# Patient Record
Sex: MALE | Race: WHITE | NOT HISPANIC OR LATINO | Employment: PART TIME | URBAN - METROPOLITAN AREA
[De-identification: names, ages, dates, MRNs, and addresses within clinical notes are randomized per-mention and may not be internally consistent; named-entity substitution may affect disease eponyms.]

---

## 2018-01-24 ENCOUNTER — OFFICE VISIT (OUTPATIENT)
Dept: FAMILY MEDICINE CLINIC | Facility: CLINIC | Age: 43
End: 2018-01-24

## 2018-01-24 VITALS
RESPIRATION RATE: 18 BRPM | SYSTOLIC BLOOD PRESSURE: 132 MMHG | WEIGHT: 315 LBS | DIASTOLIC BLOOD PRESSURE: 84 MMHG | TEMPERATURE: 98 F | HEIGHT: 74 IN | HEART RATE: 74 BPM | BODY MASS INDEX: 40.43 KG/M2

## 2018-01-24 DIAGNOSIS — J18.9 PNEUMONIA OF BOTH LOWER LOBES DUE TO INFECTIOUS ORGANISM: Primary | ICD-10-CM

## 2018-01-24 PROCEDURE — 99213 OFFICE O/P EST LOW 20 MIN: CPT | Performed by: FAMILY MEDICINE

## 2018-01-24 RX ORDER — AZITHROMYCIN 250 MG/1
TABLET, FILM COATED ORAL
Qty: 6 TABLET | Refills: 0 | Status: SHIPPED | OUTPATIENT
Start: 2018-01-24 | End: 2018-05-22

## 2018-01-24 RX ORDER — ALBUTEROL SULFATE 90 UG/1
AEROSOL, METERED RESPIRATORY (INHALATION)
Qty: 1 INHALER | Refills: 5 | Status: SHIPPED | OUTPATIENT
Start: 2018-01-24 | End: 2018-05-22

## 2018-01-24 NOTE — PROGRESS NOTES
Subjective   Norberto Keita is a 42 y.o. male.     History of Present Illness     For the last several weeks he has been ill  Lots of coughing and congestion for the last 3 weeks  Tested negative for the flu in early Jan  Just coughing a lot and has a ST due to this  No fever  Used OTC medicine without relief    nonsmoker    Review of Systems   Constitutional: Negative.  Negative for fever.   HENT: Positive for congestion.    Respiratory: Positive for cough.        Objective   Physical Exam   Constitutional: He appears well-developed and well-nourished.   HENT:   Head: Normocephalic and atraumatic.   Right Ear: Hearing, tympanic membrane, external ear and ear canal normal.   Left Ear: Hearing, tympanic membrane, external ear and ear canal normal.   Nose: Nose normal.   Mouth/Throat: Uvula is midline, oropharynx is clear and moist and mucous membranes are normal.   Eyes: Conjunctivae and EOM are normal.   Neck: Normal range of motion.   Cardiovascular: Normal rate, regular rhythm and normal heart sounds.    Pulmonary/Chest: Effort normal. He has no wheezes. He has rhonchi.   Lymphadenopathy:     He has no cervical adenopathy.   Psychiatric: He has a normal mood and affect. His behavior is normal.   Nursing note and vitals reviewed.      Assessment/Plan   Norberto was seen today for uri.    Diagnoses and all orders for this visit:    Pneumonia of both lower lobes due to infectious organism  -     azithromycin (ZITHROMAX) 250 MG tablet; Take 2 tablets the first day, then 1 tablet daily for 4 days.  -     albuterol (PROVENTIL HFA;VENTOLIN HFA) 108 (90 Base) MCG/ACT inhaler; 1-2 puffs q 4-6 hours PRN  -     HYDROcodone-homatropine (HYCODAN) 5-1.5 MG/5ML syrup; Take 5 mL by mouth Every 6 (Six) Hours As Needed for Cough.    lungs sound bad, will treat with azithromycin, albuterol PRN as well as hycodan at night.  Pt  To call back INB.  He agrees.

## 2018-05-22 ENCOUNTER — OFFICE VISIT (OUTPATIENT)
Dept: FAMILY MEDICINE CLINIC | Facility: CLINIC | Age: 43
End: 2018-05-22

## 2018-05-22 VITALS
BODY MASS INDEX: 40.43 KG/M2 | DIASTOLIC BLOOD PRESSURE: 74 MMHG | SYSTOLIC BLOOD PRESSURE: 128 MMHG | RESPIRATION RATE: 18 BRPM | TEMPERATURE: 98.5 F | HEART RATE: 74 BPM | HEIGHT: 74 IN | WEIGHT: 315 LBS

## 2018-05-22 DIAGNOSIS — I10 HYPERTENSION, ESSENTIAL: ICD-10-CM

## 2018-05-22 DIAGNOSIS — M10.9 GOUT OF BIG TOE: Primary | ICD-10-CM

## 2018-05-22 PROBLEM — E29.1 HYPOGONADISM IN MALE: Status: ACTIVE | Noted: 2018-05-22

## 2018-05-22 PROBLEM — R68.82 REDUCED LIBIDO: Status: ACTIVE | Noted: 2018-05-22

## 2018-05-22 LAB
ALBUMIN SERPL-MCNC: 4.4 G/DL (ref 3.2–4.8)
ALBUMIN/GLOB SERPL: 1.5 G/DL (ref 1.5–2.5)
ALP SERPL-CCNC: 82 U/L (ref 25–100)
ALT SERPL-CCNC: 57 U/L (ref 7–40)
AST SERPL-CCNC: 34 U/L (ref 0–33)
BASOPHILS # BLD AUTO: 0.05 10*3/MM3 (ref 0–0.2)
BASOPHILS NFR BLD AUTO: 0.6 % (ref 0–1)
BILIRUB SERPL-MCNC: 1 MG/DL (ref 0.3–1.2)
BUN SERPL-MCNC: 13 MG/DL (ref 9–23)
BUN/CREAT SERPL: 13 (ref 7–25)
CALCIUM SERPL-MCNC: 9.2 MG/DL (ref 8.7–10.4)
CHLORIDE SERPL-SCNC: 105 MMOL/L (ref 99–109)
CO2 SERPL-SCNC: 28 MMOL/L (ref 20–31)
CREAT SERPL-MCNC: 1 MG/DL (ref 0.6–1.3)
EOSINOPHIL # BLD AUTO: 0.22 10*3/MM3 (ref 0–0.3)
EOSINOPHIL NFR BLD AUTO: 2.7 % (ref 0–3)
ERYTHROCYTE [DISTWIDTH] IN BLOOD BY AUTOMATED COUNT: 13.1 % (ref 11.3–14.5)
GFR SERPLBLD CREATININE-BSD FMLA CKD-EPI: 100 ML/MIN/1.73
GFR SERPLBLD CREATININE-BSD FMLA CKD-EPI: 82 ML/MIN/1.73
GLOBULIN SER CALC-MCNC: 3 GM/DL
GLUCOSE SERPL-MCNC: 98 MG/DL (ref 70–100)
HCT VFR BLD AUTO: 46.6 % (ref 38.9–50.9)
HGB BLD-MCNC: 15 G/DL (ref 13.1–17.5)
IMM GRANULOCYTES # BLD: 0.04 10*3/MM3 (ref 0–0.03)
IMM GRANULOCYTES NFR BLD: 0.5 % (ref 0–0.6)
LYMPHOCYTES # BLD AUTO: 2.01 10*3/MM3 (ref 0.6–4.8)
LYMPHOCYTES NFR BLD AUTO: 24.6 % (ref 24–44)
MCH RBC QN AUTO: 29.8 PG (ref 27–31)
MCHC RBC AUTO-ENTMCNC: 32.2 G/DL (ref 32–36)
MCV RBC AUTO: 92.6 FL (ref 80–99)
MONOCYTES # BLD AUTO: 0.47 10*3/MM3 (ref 0–1)
MONOCYTES NFR BLD AUTO: 5.8 % (ref 0–12)
NEUTROPHILS # BLD AUTO: 5.37 10*3/MM3 (ref 1.5–8.3)
NEUTROPHILS NFR BLD AUTO: 65.8 % (ref 41–71)
PLATELET # BLD AUTO: 230 10*3/MM3 (ref 150–450)
POTASSIUM SERPL-SCNC: 4.8 MMOL/L (ref 3.5–5.5)
PROT SERPL-MCNC: 7.4 G/DL (ref 5.7–8.2)
RBC # BLD AUTO: 5.03 10*6/MM3 (ref 4.2–5.76)
SODIUM SERPL-SCNC: 140 MMOL/L (ref 132–146)
URATE SERPL-MCNC: 7.7 MG/DL (ref 3.7–9.2)
WBC # BLD AUTO: 8.16 10*3/MM3 (ref 3.5–10.8)

## 2018-05-22 PROCEDURE — 99214 OFFICE O/P EST MOD 30 MIN: CPT | Performed by: FAMILY MEDICINE

## 2018-05-22 RX ORDER — PREDNISONE 20 MG/1
TABLET ORAL
Qty: 16 TABLET | Refills: 0 | Status: SHIPPED | OUTPATIENT
Start: 2018-05-22 | End: 2018-11-28

## 2018-05-22 RX ORDER — LISINOPRIL 40 MG/1
40 TABLET ORAL DAILY
Qty: 30 TABLET | Refills: 5 | Status: SHIPPED | OUTPATIENT
Start: 2018-05-22 | End: 2018-11-28 | Stop reason: SDUPTHER

## 2018-05-22 RX ORDER — HYDROCODONE BITARTRATE AND ACETAMINOPHEN 5; 325 MG/1; MG/1
1 TABLET ORAL EVERY 6 HOURS PRN
Qty: 12 TABLET | Refills: 0 | Status: SHIPPED | OUTPATIENT
Start: 2018-05-22 | End: 2018-11-28

## 2018-05-22 NOTE — PROGRESS NOTES
Subjective   Norberto Keita is a 42 y.o. male.     History of Present Illness     For the last 4 days he has had severe pain in his legft great toe  Feels like his gout is flaring up again  He had a flare about 1 month ago    He has been on prinzide for his BP for a while  I pointed out that this can cause flares of gout and we need to change this  He has not checked his BP at home, not dieting nor exercising and knows his weight is a negative impact on his health    The following portions of the patient's history were reviewed and updated as appropriate: allergies, current medications, past family history, past medical history, past social history, past surgical history and problem list.    Review of Systems   Constitutional: Negative.    HENT: Negative.    Eyes: Negative.    Respiratory: Negative.  Negative for shortness of breath.    Cardiovascular: Negative.  Negative for chest pain.   Gastrointestinal: Negative.    Musculoskeletal: Positive for joint swelling.        Toe pain   Skin: Positive for color change.   Neurological: Negative.    Psychiatric/Behavioral: Negative.    All other systems reviewed and are negative.      Objective   Physical Exam   Constitutional: He is oriented to person, place, and time. He appears well-developed and well-nourished. No distress.   HENT:   Head: Normocephalic and atraumatic.   Right Ear: Tympanic membrane, external ear and ear canal normal.   Left Ear: Tympanic membrane, external ear and ear canal normal.   Nose: Nose normal.   Mouth/Throat: Uvula is midline and oropharynx is clear and moist.   Eyes: Conjunctivae and EOM are normal.   Neck: Normal range of motion. Neck supple. No thyromegaly present.   Cardiovascular: Normal rate, regular rhythm and normal heart sounds.    No murmur heard.  Pulmonary/Chest: Effort normal and breath sounds normal. No respiratory distress.   Abdominal: Soft. Bowel sounds are normal. He exhibits no distension and no mass. There is no  tenderness.        Lymphadenopathy:     He has no cervical adenopathy.   Neurological: He is alert and oriented to person, place, and time.   Skin: Skin is warm and dry.   Psychiatric: He has a normal mood and affect. His behavior is normal. Judgment and thought content normal.   Nursing note and vitals reviewed.      Assessment/Plan   Norberto was seen today for gout.    Diagnoses and all orders for this visit:    Gout of big toe  -     predniSONE (DELTASONE) 20 MG tablet; 3 po daily 2 days then 2 po daily 3 days then 1 po daily 3 days then 1/2 po daily 2 days  -     HYDROcodone-acetaminophen (NORCO) 5-325 MG per tablet; Take 1 tablet by mouth Every 6 (Six) Hours As Needed for Severe Pain .  -     CBC & Differential  -     Comprehensive Metabolic Panel  -     Uric Acid    Hypertension, essential  -     lisinopril (PRINIVIL,ZESTRIL) 40 MG tablet; Take 1 tablet by mouth Daily.  -     CBC & Differential  -     Comprehensive Metabolic Panel    pred taper and pain medicine for gout.  Discussed with pt that he may need allopurinol.  Will stop the HCTZ portion of his BP medicine, check labs and see if this decreases frequency of gout attacks.  Pt to follow up if problems persist.  BP medicine to be changed due to gout, increase to lisinopril 40.  Diet and exercise discussed.

## 2018-05-24 ENCOUNTER — TELEPHONE (OUTPATIENT)
Dept: INTERNAL MEDICINE | Facility: CLINIC | Age: 43
End: 2018-05-24

## 2018-05-24 NOTE — TELEPHONE ENCOUNTER
----- Message from Cam Cheung MD sent at 5/23/2018  8:26 AM EDT -----  His labs overall look good.  Blood counts are normal, normal kidney and glucose and uric acid.  Liver still with very slight inflammation, would simply recommend weight loss for this and recheck in future to monitor.  Overall labs look fine though with no concerning problems found

## 2018-11-25 DIAGNOSIS — I10 HYPERTENSION, ESSENTIAL: ICD-10-CM

## 2018-11-26 RX ORDER — LISINOPRIL 40 MG/1
TABLET ORAL
Qty: 30 TABLET | Refills: 5 | OUTPATIENT
Start: 2018-11-26

## 2018-11-28 ENCOUNTER — TELEPHONE (OUTPATIENT)
Dept: FAMILY MEDICINE CLINIC | Facility: CLINIC | Age: 43
End: 2018-11-28

## 2018-11-28 DIAGNOSIS — I10 HYPERTENSION, ESSENTIAL: ICD-10-CM

## 2018-11-28 RX ORDER — LISINOPRIL 40 MG/1
40 TABLET ORAL DAILY
Qty: 30 TABLET | Refills: 0 | Status: SHIPPED | OUTPATIENT
Start: 2018-11-28 | End: 2018-12-27 | Stop reason: SDUPTHER

## 2018-11-28 NOTE — TELEPHONE ENCOUNTER
----- Message from Lily Nieto sent at 11/28/2018  3:44 PM EST -----  Contact: TOM;WIFE CALLED  PT HAS AN APPT ON MON 12/3 COULD HE GET ENOUGH  lisinopril (PRINIVIL,ZESTRIL) 40 MG tablet SENT TO [facility]   UNTIL HIS APPT?    [phone]-[phone]

## 2018-12-27 ENCOUNTER — TELEPHONE (OUTPATIENT)
Dept: FAMILY MEDICINE CLINIC | Facility: CLINIC | Age: 43
End: 2018-12-27

## 2018-12-27 DIAGNOSIS — I10 HYPERTENSION, ESSENTIAL: ICD-10-CM

## 2018-12-27 RX ORDER — LISINOPRIL 40 MG/1
40 TABLET ORAL DAILY
Qty: 30 TABLET | Refills: 2 | Status: SHIPPED | OUTPATIENT
Start: 2018-12-27 | End: 2019-01-07 | Stop reason: SDUPTHER

## 2018-12-27 NOTE — TELEPHONE ENCOUNTER
"rx sent to pharmacy. Make sure he is following up with Dr. Cheung. He has had two recent \"no shows\" [facility]  "

## 2018-12-27 NOTE — TELEPHONE ENCOUNTER
----- Message from Nayla Ceballos sent at 12/27/2018 11:59 AM EST -----  Contact: DEVEN ; MED REFILL   Medications    lisinopril (PRINIVIL,ZESTRIL) 40 MG tablet Take 1 tablet by mouth Daily.    [facility]      [address] - [address] - [phone]  - [phone] (Phone)  [phone] (Fax)

## 2019-01-07 ENCOUNTER — TELEPHONE (OUTPATIENT)
Dept: FAMILY MEDICINE CLINIC | Facility: CLINIC | Age: 44
End: 2019-01-07

## 2019-01-07 ENCOUNTER — OFFICE VISIT (OUTPATIENT)
Dept: FAMILY MEDICINE CLINIC | Facility: CLINIC | Age: 44
End: 2019-01-07

## 2019-01-07 VITALS
WEIGHT: 315 LBS | HEART RATE: 78 BPM | HEIGHT: 74 IN | RESPIRATION RATE: 18 BRPM | SYSTOLIC BLOOD PRESSURE: 132 MMHG | TEMPERATURE: 97.9 F | DIASTOLIC BLOOD PRESSURE: 102 MMHG | BODY MASS INDEX: 40.43 KG/M2

## 2019-01-07 DIAGNOSIS — R07.89 ATYPICAL CHEST PAIN: ICD-10-CM

## 2019-01-07 DIAGNOSIS — I10 HYPERTENSION, ESSENTIAL: Primary | ICD-10-CM

## 2019-01-07 DIAGNOSIS — G47.8 UNREFRESHED BY SLEEP: ICD-10-CM

## 2019-01-07 DIAGNOSIS — R29.818 SUSPECTED SLEEP APNEA: ICD-10-CM

## 2019-01-07 PROCEDURE — 99214 OFFICE O/P EST MOD 30 MIN: CPT | Performed by: FAMILY MEDICINE

## 2019-01-07 RX ORDER — LISINOPRIL 40 MG/1
40 TABLET ORAL DAILY
Qty: 30 TABLET | Refills: 2 | Status: SHIPPED | OUTPATIENT
Start: 2019-01-07 | End: 2019-03-08 | Stop reason: SDUPTHER

## 2019-01-07 RX ORDER — HYDROGEN PEROXIDE 2.65 ML/100ML
LIQUID ORAL; TOPICAL
COMMUNITY
Start: 2018-12-29 | End: 2021-06-30

## 2019-01-07 RX ORDER — CARVEDILOL 6.25 MG/1
TABLET ORAL
COMMUNITY
Start: 2018-12-29 | End: 2019-01-07 | Stop reason: SDUPTHER

## 2019-01-07 RX ORDER — CARVEDILOL 6.25 MG/1
6.25 TABLET ORAL 2 TIMES DAILY WITH MEALS
Qty: 60 TABLET | Refills: 2 | Status: SHIPPED | OUTPATIENT
Start: 2019-01-07 | End: 2019-04-19

## 2019-01-07 NOTE — PROGRESS NOTES
Subjective   Norberto Keita is a 43 y.o. male.     History of Present Illness     Norberto Keita  is here for follow-up of hypertension of several years duration. He is not exercising and is not adherent to a low-salt diet. Patient does not check his blood pressure. Patient denies palpitations. Cardiovascular risk factors: hypertension, male gender, obesity (BMI >= 30 kg/m2) and sedentary lifestyle. He is compliant with meds.    He had some chest pain on 12/28th and was transferred to [facility] for evaluation  His BP was high at that time  He had a normal stress test that was normal  They switched him to carvedilol and he has only been taking that    He has been stressed for a while as well      He also complains of fatigue all the time  Has unrefreshing sleep and feels like he could go back to bed any time  Worried about apnea      The following portions of the patient's history were reviewed and updated as appropriate: allergies, current medications, past family history, past medical history, past social history, past surgical history and problem list.    Review of Systems   Constitutional: Negative.    HENT: Negative.    Eyes: Negative.    Respiratory: Negative.    Cardiovascular: Positive for chest pain (not since the ER).   Gastrointestinal: Negative.  Negative for constipation and diarrhea.   Musculoskeletal: Negative.    Skin: Negative.    Neurological: Negative.    Psychiatric/Behavioral: Negative.  The patient is not nervous/anxious.    All other systems reviewed and are negative.      Objective   Physical Exam   Constitutional: He is oriented to person, place, and time. He appears well-developed and well-nourished. No distress.   Neck: No thyromegaly present.   Large neck   Cardiovascular: Normal rate, regular rhythm and normal heart sounds.   Pulmonary/Chest: Effort normal and breath sounds normal.   Abdominal: Soft. Bowel sounds are normal. He exhibits no distension. There is no tenderness.    Lymphadenopathy:     He has no cervical adenopathy.   Neurological: He is alert and oriented to person, place, and time.   Psychiatric: He has a normal mood and affect. His behavior is normal. Judgment and thought content normal.   Nursing note and vitals reviewed.      Assessment/Plan   Norberto was seen today for follow-up and hypertension.    Diagnoses and all orders for this visit:    Hypertension, essential  -     lisinopril (PRINIVIL,ZESTRIL) 40 MG tablet; Take 1 tablet by mouth Daily.  -     carvedilol (COREG) 6.25 MG tablet; Take 1 tablet by mouth 2 (Two) Times a Day With Meals.    Atypical chest pain    BP not well controlled but he had stopped lisinopril, will resume this along with the coreg and recheck in future. Asked pt to check at home (script given for home BP cuff) and will  Call back in a month with update.  Will request old records form ER visit  Will chck sleep study for sleep issues, fatigue.  I do suspect sleep apnea

## 2019-01-07 NOTE — TELEPHONE ENCOUNTER
----- Message from Veronica Cassidy sent at 1/7/2019  3:19 PM EST -----  Contact: PT; TOM  PATIENT THINKS HE HAS SLEEP APNEA AND WANTS TO DO A SLEEP HOME STUDY AND WANTS TO KNOW CAN HE GET A REFERRAL FOR THAT HE WAS HERE EARLIER AND FORGOT TO ASK DR SANTOS ABOUT IT.    PHONE: [phone]

## 2019-01-30 ENCOUNTER — TELEPHONE (OUTPATIENT)
Dept: FAMILY MEDICINE CLINIC | Facility: CLINIC | Age: 44
End: 2019-01-30

## 2019-01-30 NOTE — TELEPHONE ENCOUNTER
----- Message from Lily Nieto sent at 1/30/2019  8:43 AM EST -----  Contact: TOM;WIFE CALLED-[facility]  PT NEEDS REFILL ON carvedilol (COREG) 6.25 MG tablet AND   EQ ASPIRIN ADULT LOW DOSE 81 MG EC tablet  THAT WAS GIVEN TO HIM WHILE IN THE [facility] IN [place]  [phone]

## 2019-01-30 NOTE — TELEPHONE ENCOUNTER
Patient/patients wife informed, verbalized good understanding. States they went to  rx and pharmacy states they did not have it, called [facility] and spoke with Yaritza and she states they do have rx. Patient/patients wife informed, verbalized good understanding

## 2019-03-07 ENCOUNTER — TELEPHONE (OUTPATIENT)
Dept: FAMILY MEDICINE CLINIC | Facility: CLINIC | Age: 44
End: 2019-03-07

## 2019-03-07 NOTE — TELEPHONE ENCOUNTER
----- Message from Nayla Ceballos sent at 3/7/2019  9:19 AM EST -----  Contact: TOM; MED REFILL   lisinopril (PRINIVIL,ZESTRIL) 40 MG tablet Take 1 tablet by mouth Daily.    Preferred Pharmacies      [facility]       PT GOT A CALL STATING THAT THIS WAS DENIED. HIS WIFE WANTED WANTED TO KNOW WHY

## 2019-03-08 DIAGNOSIS — I10 HYPERTENSION, ESSENTIAL: ICD-10-CM

## 2019-03-08 RX ORDER — LISINOPRIL 40 MG/1
40 TABLET ORAL DAILY
Qty: 14 TABLET | Refills: 0 | Status: SHIPPED | OUTPATIENT
Start: 2019-03-08 | End: 2019-04-19 | Stop reason: SDUPTHER

## 2019-03-08 NOTE — TELEPHONE ENCOUNTER
He ws supposed to follow up for BP check in Feb, would like to see him to see how his BP is running.  Please schedule appointment.  Ok to call in refill to local pharmacy but would need to check BP here to get long term refill if BP is being controlled.

## 2019-04-19 ENCOUNTER — TELEPHONE (OUTPATIENT)
Dept: FAMILY MEDICINE CLINIC | Facility: CLINIC | Age: 44
End: 2019-04-19

## 2019-04-19 ENCOUNTER — OFFICE VISIT (OUTPATIENT)
Dept: FAMILY MEDICINE CLINIC | Facility: CLINIC | Age: 44
End: 2019-04-19

## 2019-04-19 VITALS
HEIGHT: 74 IN | BODY MASS INDEX: 40.43 KG/M2 | DIASTOLIC BLOOD PRESSURE: 80 MMHG | HEART RATE: 84 BPM | WEIGHT: 315 LBS | TEMPERATURE: 97.9 F | RESPIRATION RATE: 16 BRPM | SYSTOLIC BLOOD PRESSURE: 130 MMHG

## 2019-04-19 DIAGNOSIS — I10 HYPERTENSION, ESSENTIAL: ICD-10-CM

## 2019-04-19 DIAGNOSIS — J18.9 COMMUNITY ACQUIRED PNEUMONIA, UNSPECIFIED LATERALITY: Primary | ICD-10-CM

## 2019-04-19 PROCEDURE — 99213 OFFICE O/P EST LOW 20 MIN: CPT | Performed by: NURSE PRACTITIONER

## 2019-04-19 RX ORDER — IBUPROFEN 800 MG/1
800 TABLET ORAL EVERY 6 HOURS PRN
Qty: 30 TABLET | Refills: 1 | Status: SHIPPED | OUTPATIENT
Start: 2019-04-19 | End: 2019-05-24

## 2019-04-19 RX ORDER — METHYLPREDNISOLONE 4 MG/1
TABLET ORAL
Qty: 21 TABLET | Refills: 0 | Status: SHIPPED | OUTPATIENT
Start: 2019-04-19 | End: 2019-05-24

## 2019-04-19 RX ORDER — LISINOPRIL 40 MG/1
40 TABLET ORAL DAILY
Qty: 90 TABLET | Refills: 0 | Status: SHIPPED | OUTPATIENT
Start: 2019-04-19 | End: 2019-06-21 | Stop reason: SDUPTHER

## 2019-04-19 RX ORDER — AZITHROMYCIN 250 MG/1
TABLET, FILM COATED ORAL
Qty: 6 TABLET | Refills: 0 | Status: SHIPPED | OUTPATIENT
Start: 2019-04-19 | End: 2019-05-24

## 2019-04-19 NOTE — TELEPHONE ENCOUNTER
----- Message from Fabienne Flowers sent at 4/19/2019  3:22 PM EDT -----  Contact: DR SANTOS  PATIENT IS REQUESTING A PRESCRIPTION FOR IBUPROFEN 800MG  FOR HIS PNEUMONIA SENT TO [facility] IN [place].  [phone]

## 2019-04-19 NOTE — PROGRESS NOTES
Subjective   Norberto Keita is a 43 y.o. male.     History of Present Illness   Cough and chest congestion, body ache, Fever of 100, diarrhea started on Wed and once yesterday  Went to [facility] Wed was checked for flu was negative  Mucinex and Claritin Delsym Ibuprofen not helping  Feeling worse, having a lot of fatigue and night sweats, coughing spells  No dizziness or HA or swelling or CP or neck stiffness  No hx of tobacco abuse or smoking no hx of pneumonia or asthma  Just returned from vacation plane rides and cruise    HTN  Stable on current meds  Needs med refill    The following portions of the patient's history were reviewed and updated as appropriate: allergies, current medications, past family history, past medical history, past social history, past surgical history and problem list.    Review of Systems   Constitutional: Positive for activity change, diaphoresis, fatigue and fever.   HENT: Positive for congestion, postnasal drip and rhinorrhea. Negative for sore throat and trouble swallowing.    Eyes: Negative.  Negative for visual disturbance.   Respiratory: Positive for cough, chest tightness, shortness of breath and wheezing.    Cardiovascular: Negative.  Negative for chest pain and palpitations.   Gastrointestinal: Negative.    Musculoskeletal: Negative.  Negative for back pain, neck pain and neck stiffness.   Skin: Negative.    Neurological: Negative for dizziness, light-headedness and headache.   Hematological: Negative.    Psychiatric/Behavioral: Positive for sleep disturbance (due to cough).       Objective   Physical Exam   Constitutional: He is oriented to person, place, and time. He appears well-developed and well-nourished. No distress.   HENT:   Head: Normocephalic.   Right Ear: External ear normal.   Left Ear: External ear normal.   Nose: Nose normal.   Mouth/Throat: Oropharynx is clear and moist. No oropharyngeal exudate.   Eyes: Conjunctivae are normal. Pupils are equal, round, and reactive to  light.   Neck: Neck supple.   Cardiovascular: Normal rate, regular rhythm and normal heart sounds.   Pulmonary/Chest: Effort normal. No stridor. No respiratory distress. He has no decreased breath sounds. He has no wheezes. He has rales in the right upper field and the right middle field.   Musculoskeletal: He exhibits no edema.   Lymphadenopathy:     He has no cervical adenopathy.   Neurological: He is alert and oriented to person, place, and time.   Skin: Skin is warm and dry. Capillary refill takes less than 2 seconds. He is not diaphoretic.   Psychiatric: He has a normal mood and affect. His behavior is normal. Judgment and thought content normal.   Nursing note and vitals reviewed.        Assessment/Plan   Norberto was seen today for sinus congestion, pnd and chest congestion & cough.    Diagnoses and all orders for this visit:    Community acquired pneumonia, unspecified laterality  -     azithromycin (ZITHROMAX) 250 MG tablet; Take 2 tablets the first day, then 1 tablet daily for 4 days.  -     methylPREDNISolone (MEDROL, VENU,) 4 MG tablet; Take as directed on package instructions.    Hypertension, essential  -     lisinopril (PRINIVIL,ZESTRIL) 40 MG tablet; Take 1 tablet by mouth Daily.      Will refill BP meds  Will treat pt with Zpak and steroids, continue OTC meds for fever and cough/congestion  If not improving pt advised to follow up will check CXR or go to ER if having difficulty breathing pt agrees

## 2019-05-24 ENCOUNTER — OFFICE VISIT (OUTPATIENT)
Dept: FAMILY MEDICINE CLINIC | Facility: CLINIC | Age: 44
End: 2019-05-24

## 2019-05-24 ENCOUNTER — HOSPITAL ENCOUNTER (OUTPATIENT)
Dept: GENERAL RADIOLOGY | Facility: HOSPITAL | Age: 44
Discharge: HOME | End: 2019-05-24
Admitting: FAMILY MEDICINE

## 2019-05-24 ENCOUNTER — TELEPHONE (OUTPATIENT)
Dept: FAMILY MEDICINE CLINIC | Facility: CLINIC | Age: 44
End: 2019-05-24

## 2019-05-24 VITALS
TEMPERATURE: 98 F | SYSTOLIC BLOOD PRESSURE: 134 MMHG | HEART RATE: 76 BPM | BODY MASS INDEX: 40.43 KG/M2 | WEIGHT: 315 LBS | HEIGHT: 74 IN | DIASTOLIC BLOOD PRESSURE: 98 MMHG | RESPIRATION RATE: 18 BRPM

## 2019-05-24 DIAGNOSIS — G89.29 CHRONIC MIDLINE LOW BACK PAIN WITHOUT SCIATICA: Primary | ICD-10-CM

## 2019-05-24 DIAGNOSIS — G89.29 CHRONIC MIDLINE LOW BACK PAIN WITHOUT SCIATICA: ICD-10-CM

## 2019-05-24 DIAGNOSIS — M54.50 CHRONIC MIDLINE LOW BACK PAIN WITHOUT SCIATICA: Primary | ICD-10-CM

## 2019-05-24 DIAGNOSIS — M54.50 CHRONIC MIDLINE LOW BACK PAIN WITHOUT SCIATICA: ICD-10-CM

## 2019-05-24 PROCEDURE — 72110 X-RAY EXAM L-2 SPINE 4/>VWS: CPT

## 2019-05-24 PROCEDURE — 99214 OFFICE O/P EST MOD 30 MIN: CPT | Performed by: FAMILY MEDICINE

## 2019-05-24 RX ORDER — NAPROXEN 500 MG/1
500 TABLET ORAL 2 TIMES DAILY WITH MEALS
Qty: 60 TABLET | Refills: 1 | Status: SHIPPED | OUTPATIENT
Start: 2019-05-24 | End: 2019-08-16

## 2019-05-24 RX ORDER — HYDROCODONE BITARTRATE AND ACETAMINOPHEN 5; 325 MG/1; MG/1
1 TABLET ORAL EVERY 6 HOURS PRN
Qty: 12 TABLET | Refills: 0 | Status: SHIPPED | OUTPATIENT
Start: 2019-05-24 | End: 2019-06-21

## 2019-05-24 RX ORDER — CYCLOBENZAPRINE HCL 10 MG
TABLET ORAL
Qty: 30 TABLET | Refills: 2 | Status: SHIPPED | OUTPATIENT
Start: 2019-05-24 | End: 2020-08-24

## 2019-05-24 NOTE — PROGRESS NOTES
Subjective   Norberto Keita is a 43 y.o. male.     History of Present Illness     He has been working in the yard, cutting down tress this week and when he was lifting he had some pain in his low back  Started about 2 months ago when he was lifting something and then felt a pop in his low back, slowly got better after a couple days but then returned this week  This was reaggravated on 5/21/19 when he was moving a tree in his yard  Pain was quite bad and kept him laying around the last couple days  Pain is all lower middle back, no radiation  Pain is constant, aching pain    Aggravating: movement  Alleviating: heat    The following portions of the patient's history were reviewed and updated as appropriate: allergies, current medications, past family history, past medical history, past social history, past surgical history and problem list.    Review of Systems   Constitutional: Negative.    HENT: Negative.    Eyes: Negative.    Respiratory: Negative.  Negative for shortness of breath.    Cardiovascular: Negative.  Negative for chest pain.   Gastrointestinal: Negative.  Negative for constipation and diarrhea.   Musculoskeletal: Positive for back pain.   Skin: Negative.    Neurological: Negative.    Psychiatric/Behavioral: Negative.    All other systems reviewed and are negative.      Objective   Physical Exam   Constitutional: He appears well-developed and well-nourished. No distress.   Cardiovascular: Normal rate, regular rhythm and normal heart sounds.   Pulmonary/Chest: Effort normal and breath sounds normal.   Musculoskeletal:        Back:    Psychiatric: He has a normal mood and affect. His behavior is normal.   Nursing note and vitals reviewed.      Assessment/Plan   Norberto was seen today for back pain.    Diagnoses and all orders for this visit:    Chronic midline low back pain without sciatica  -     HYDROcodone-acetaminophen (NORCO) 5-325 MG per tablet; Take 1 tablet by mouth Every 6 (Six) Hours As Needed for  Severe Pain .  -     cyclobenzaprine (FLEXERIL) 10 MG tablet; 1 PO QHS  -     naproxen (NAPROSYN) 500 MG tablet; Take 1 tablet by mouth 2 (Two) Times a Day With Meals.  -     XR Spine Lumbar 4+ View; Future    NSAId, muscle relaxer, lortab PRN and home PT.  Check XR and f/u pending results.  Pt agrees    Work note 5/21 through 5/28

## 2019-05-28 DIAGNOSIS — M54.50 CHRONIC MIDLINE LOW BACK PAIN WITHOUT SCIATICA: Primary | ICD-10-CM

## 2019-05-28 DIAGNOSIS — R93.7 ABNORMAL X-RAY OF LUMBAR SPINE: ICD-10-CM

## 2019-05-28 DIAGNOSIS — G89.29 CHRONIC MIDLINE LOW BACK PAIN WITHOUT SCIATICA: Primary | ICD-10-CM

## 2019-06-04 ENCOUNTER — HOSPITAL ENCOUNTER (OUTPATIENT)
Dept: MRI IMAGING | Facility: HOSPITAL | Age: 44
Discharge: HOME | End: 2019-06-04
Admitting: FAMILY MEDICINE

## 2019-06-04 DIAGNOSIS — M54.50 CHRONIC MIDLINE LOW BACK PAIN WITHOUT SCIATICA: ICD-10-CM

## 2019-06-04 DIAGNOSIS — R93.7 ABNORMAL X-RAY OF LUMBAR SPINE: ICD-10-CM

## 2019-06-04 DIAGNOSIS — G89.29 CHRONIC MIDLINE LOW BACK PAIN WITHOUT SCIATICA: ICD-10-CM

## 2019-06-04 PROCEDURE — 72148 MRI LUMBAR SPINE W/O DYE: CPT

## 2019-06-07 DIAGNOSIS — G89.29 CHRONIC MIDLINE LOW BACK PAIN WITHOUT SCIATICA: ICD-10-CM

## 2019-06-07 DIAGNOSIS — M51.37 DEGENERATION OF INTERVERTEBRAL DISC AT L5-S1 LEVEL: Primary | ICD-10-CM

## 2019-06-07 DIAGNOSIS — R93.7 ABNORMAL X-RAY OF LUMBAR SPINE: ICD-10-CM

## 2019-06-07 DIAGNOSIS — M54.50 CHRONIC MIDLINE LOW BACK PAIN WITHOUT SCIATICA: ICD-10-CM

## 2019-06-12 ENCOUNTER — OFFICE VISIT (OUTPATIENT)
Dept: NEUROSURGERY | Facility: CLINIC | Age: 44
End: 2019-06-12

## 2019-06-12 VITALS
WEIGHT: 315 LBS | HEIGHT: 74 IN | BODY MASS INDEX: 40.43 KG/M2 | DIASTOLIC BLOOD PRESSURE: 138 MMHG | SYSTOLIC BLOOD PRESSURE: 195 MMHG | TEMPERATURE: 97.8 F

## 2019-06-12 DIAGNOSIS — M54.50 CHRONIC BILATERAL LOW BACK PAIN WITHOUT SCIATICA: Primary | ICD-10-CM

## 2019-06-12 DIAGNOSIS — G89.29 CHRONIC BILATERAL LOW BACK PAIN WITHOUT SCIATICA: Primary | ICD-10-CM

## 2019-06-12 DIAGNOSIS — E66.01 CLASS 3 SEVERE OBESITY DUE TO EXCESS CALORIES WITH SERIOUS COMORBIDITY AND BODY MASS INDEX (BMI) OF 40.0 TO 44.9 IN ADULT (HCC): ICD-10-CM

## 2019-06-12 PROBLEM — E66.813 CLASS 3 SEVERE OBESITY DUE TO EXCESS CALORIES WITH SERIOUS COMORBIDITY AND BODY MASS INDEX (BMI) OF 40.0 TO 44.9 IN ADULT: Status: ACTIVE | Noted: 2019-06-12

## 2019-06-12 PROCEDURE — 99243 OFF/OP CNSLTJ NEW/EST LOW 30: CPT | Performed by: NEUROLOGICAL SURGERY

## 2019-06-12 NOTE — PROGRESS NOTES
Subjective     Chief Complaint: Low back pain    Patient ID: Norberto Keita is a 43 y.o. male seen for consultation today at the request of  Cam Cheung MD    Back Pain   This is a recurrent problem. The current episode started 1 to 4 weeks ago. The problem occurs constantly. The problem is unchanged. The pain is present in the lumbar spine and sacro-iliac. The quality of the pain is described as aching. The pain is at a severity of 5/10. The pain is moderate. The pain is worse during the day. The symptoms are aggravated by bending, position, standing and twisting. Stiffness is present in the morning. Associated symptoms include weakness. Pertinent negatives include no abdominal pain, chest pain, dysuria, fever, headaches or numbness. Risk factors include recent trauma and obesity. He has tried analgesics and NSAIDs for the symptoms. The treatment provided mild relief.       This is a 43-year-old man who presents to my clinic with chief complaints of chronic low back pain.  He has had a relatively acute exacerbation in the last several weeks.  He attributes this to lifting some heavy loads of wood and twisting.  He has not tried conservative treatment.  He denies radiating pain into his legs.  He denies bladder/bowel incontinence.    The following portions of the patient's history were reviewed and updated as appropriate: allergies, current medications, past family history, past medical history, past social history, past surgical history and problem list.    Family history:   Family History   Problem Relation Age of Onset   • Diabetes Mother    • Hyperlipidemia Mother    • Hypertension Mother    • Coronary artery disease Father    • Hyperlipidemia Father    • Hypertension Father        Social history:   Social History     Socioeconomic History   • Marital status:      Spouse name: Not on file   • Number of children: Not on file   • Years of education: Not on file   • Highest education level: Not on file    Tobacco Use   • Smoking status: Never Smoker   • Smokeless tobacco: Never Used   Substance and Sexual Activity   • Alcohol use: Yes     Comment: 3-4 times weekly   • Drug use: No       Review of Systems   Constitutional: Positive for activity change, diaphoresis and fatigue. Negative for appetite change, chills, fever and unexpected weight change.   HENT: Negative for congestion, dental problem, drooling, ear discharge, ear pain, facial swelling, hearing loss, mouth sores, nosebleeds, postnasal drip, rhinorrhea, sinus pressure, sneezing, sore throat, tinnitus, trouble swallowing and voice change.    Eyes: Negative for photophobia, pain, discharge, redness, itching and visual disturbance.   Respiratory: Positive for apnea. Negative for cough, choking, chest tightness, shortness of breath, wheezing and stridor.    Cardiovascular: Negative for chest pain, palpitations and leg swelling.   Gastrointestinal: Negative for abdominal distention, abdominal pain, anal bleeding, blood in stool, constipation, diarrhea, nausea, rectal pain and vomiting.   Endocrine: Negative for cold intolerance, heat intolerance, polydipsia, polyphagia and polyuria.   Genitourinary: Negative for decreased urine volume, difficulty urinating, dysuria, enuresis, flank pain, frequency, genital sores, hematuria and urgency.   Musculoskeletal: Positive for back pain, myalgias and neck stiffness. Negative for arthralgias, gait problem, joint swelling and neck pain.   Skin: Negative for color change, pallor, rash and wound.   Allergic/Immunologic: Negative for environmental allergies, food allergies and immunocompromised state.   Neurological: Positive for weakness and light-headedness. Negative for dizziness, tremors, seizures, syncope, facial asymmetry, speech difficulty, numbness and headaches.   Hematological: Negative for adenopathy. Does not bruise/bleed easily.   Psychiatric/Behavioral: Negative for agitation, behavioral problems, confusion,  "decreased concentration, dysphoric mood, hallucinations, self-injury, sleep disturbance and suicidal ideas. The patient is not nervous/anxious and is not hyperactive.        Objective   Blood pressure (!) 195/138, temperature 97.8 °F (36.6 °C), temperature source Temporal, height 188 cm (74.02\"), weight (!) 149 kg (328 lb).  Body mass index is 42.09 kg/m².    Physical Exam   Constitutional: He is oriented to person, place, and time. He appears well-developed.  Non-toxic appearance.   HENT:   Head: Normocephalic and atraumatic.   Right Ear: Hearing normal.   Left Ear: Hearing normal.   Nose: Nose normal.   Eyes: Conjunctivae, EOM and lids are normal. Pupils are equal, round, and reactive to light.   Neck: Normal range of motion. No JVD present.   Cardiovascular: Normal rate and regular rhythm.   Pulses:       Radial pulses are 2+ on the right side, and 2+ on the left side.   Pulmonary/Chest: Effort normal. No stridor. No respiratory distress. He has no wheezes.   Musculoskeletal:        Right hip: He exhibits decreased range of motion.        Left hip: He exhibits decreased range of motion.        Thoracic back: He exhibits no tenderness, no swelling, no pain and no spasm.        Lumbar back: He exhibits decreased range of motion and tenderness. He exhibits no bony tenderness, no swelling, no pain and no spasm.   Muscle Group    L          R  Hip Flexor          5          5  Knee Extensor  5          5  ADF                   5          5  APF                   5          5  EHL                   5          5   Neurological: He is alert and oriented to person, place, and time. He has normal strength and normal reflexes. He displays normal reflexes. No cranial nerve deficit or sensory deficit. He exhibits normal muscle tone. He displays a negative Romberg sign. GCS eye subscore is 4. GCS verbal subscore is 5. GCS motor subscore is 6.   Reflex Scores:       Tricep reflexes are 2+ on the right side and 2+ on the left " side.       Bicep reflexes are 2+ on the right side and 2+ on the left side.       Brachioradialis reflexes are 2+ on the right side and 2+ on the left side.       Patellar reflexes are 2+ on the right side and 2+ on the left side.       Achilles reflexes are 2+ on the right side and 2+ on the left side.  Skin: Skin is warm and dry. No rash noted. No erythema.   Psychiatric: He has a normal mood and affect. His behavior is normal. Judgment and thought content normal.   Nursing note and vitals reviewed.        Assessment/Plan     Independent Review of Radiographic Studies:      Available for my review is a MRI of the lumbar spine which was performed on 6/4/2019.  This demonstrates moderate bordering on severe degenerative disc disease at L5-S1.  There is a broad-based disc osteophyte complex which is eccentric to the right side causing moderate lateral recess stenosis.  There is moderate bilateral neuroforaminal stenosis.  There appears to be a pars defect at L5-S1 with no evidence of spondylolisthesis.  The remainder of the MRI of the lumbar spine is unremarkable showing only mild degenerative disc disease at L4-5 and mild loss of lumbar lordosis.    Medical Decision Making:      This is a 43-year-old man with musculoskeletal low back pain.  He does have what appears to be a chronic pars defect at L5-S1 with severe degenerative disc disease, however I do not think that this is contributing to his symptoms at this point.  He also does not have any signs or symptoms of lumbosacral radiculopathy.  I think he is an excellent candidate for weight loss and physical therapy.  If is not better after 1 round of physical therapy, but I would add some pain management.  If he fails 6 months of aggressive pain management and physical therapy and is still having severe low back pain, and I would perform a lumbar myelogram to determine if he has a mobile spondylolisthesis.  I would like to follow-up with him in about 3 months, or  sooner if clinically indicated.    Norberto was seen today for back pain.    Diagnoses and all orders for this visit:    Chronic bilateral low back pain without sciatica  -     Ambulatory Referral to Physical Therapy Evaluate and treat    Class 3 severe obesity due to excess calories with serious comorbidity and body mass index (BMI) of 40.0 to 44.9 in adult (CMS/[facility])  -     Ambulatory Referral to Nutrition Services        Return in about 3 months (around 9/12/2019).           This document signed by GRABIEL Saunders MD June 12, 2019 11:00 AM

## 2019-06-19 ENCOUNTER — TELEPHONE (OUTPATIENT)
Dept: NEUROSURGERY | Facility: CLINIC | Age: 44
End: 2019-06-19

## 2019-06-19 NOTE — TELEPHONE ENCOUNTER
Patient called stating that he needed copy of PT order, confirmed address and placed order in outgoing mail

## 2019-06-20 ENCOUNTER — HOSPITAL ENCOUNTER (OUTPATIENT)
Dept: NUTRITION | Facility: HOSPITAL | Age: 44
Setting detail: RECURRING SERIES
Discharge: HOME | End: 2019-06-20

## 2019-06-20 VITALS — WEIGHT: 315 LBS | BODY MASS INDEX: 40.43 KG/M2 | HEIGHT: 74 IN

## 2019-06-20 PROCEDURE — 97802 MEDICAL NUTRITION INDIV IN: CPT

## 2019-06-21 ENCOUNTER — OFFICE VISIT (OUTPATIENT)
Dept: FAMILY MEDICINE CLINIC | Facility: CLINIC | Age: 44
End: 2019-06-21

## 2019-06-21 VITALS
RESPIRATION RATE: 16 BRPM | SYSTOLIC BLOOD PRESSURE: 152 MMHG | BODY MASS INDEX: 42.66 KG/M2 | OXYGEN SATURATION: 98 % | WEIGHT: 315 LBS | TEMPERATURE: 97.8 F | HEART RATE: 101 BPM | HEIGHT: 72 IN | DIASTOLIC BLOOD PRESSURE: 112 MMHG

## 2019-06-21 DIAGNOSIS — M54.50 CHRONIC MIDLINE LOW BACK PAIN WITHOUT SCIATICA: Primary | ICD-10-CM

## 2019-06-21 DIAGNOSIS — I10 HYPERTENSION, ESSENTIAL: ICD-10-CM

## 2019-06-21 DIAGNOSIS — G89.29 CHRONIC MIDLINE LOW BACK PAIN WITHOUT SCIATICA: Primary | ICD-10-CM

## 2019-06-21 DIAGNOSIS — M77.12 LEFT LATERAL EPICONDYLITIS: ICD-10-CM

## 2019-06-21 PROCEDURE — 99214 OFFICE O/P EST MOD 30 MIN: CPT | Performed by: FAMILY MEDICINE

## 2019-06-21 RX ORDER — ATENOLOL 100 MG/1
100 TABLET ORAL DAILY
Qty: 30 TABLET | Refills: 3 | Status: SHIPPED | OUTPATIENT
Start: 2019-06-21 | End: 2019-09-16 | Stop reason: SDUPTHER

## 2019-06-21 RX ORDER — LISINOPRIL 40 MG/1
40 TABLET ORAL DAILY
Qty: 90 TABLET | Refills: 0 | Status: SHIPPED | OUTPATIENT
Start: 2019-06-21 | End: 2019-09-16 | Stop reason: SDUPTHER

## 2019-06-21 NOTE — PROGRESS NOTES
Subjective   Norberto Keita is a 43 y.o. male.     History of Present Illness     Pt continues to deal with back pain  He is seeing Dr. Saunders for this.  He had MRI on 6/4/19 and they are planning on PT and weight loss for management with possible myelogram in 3 months INB.  He will be seeing PT in [place] where he lives      Pt is working part time  He has been off work since the 21rst, I had placed his off for a week  Needs forms for work    He has some pain in his left lateral elbow  Happened yesterday after moving something, he jerked and then felt pain  Hurts with certain movements today      His BP has been elevated still  He is on lisinopril 40, HCTZ caused gout  BP has been higher at the [facility] office as well  No CP and no other symptoms    The following portions of the patient's history were reviewed and updated as appropriate: allergies, current medications, past family history, past medical history, past social history, past surgical history and problem list.    Review of Systems   Constitutional: Negative.    HENT: Negative.    Eyes: Negative.    Respiratory: Negative.    Cardiovascular: Negative.  Negative for chest pain and palpitations.   Gastrointestinal: Negative.    Musculoskeletal: Positive for back pain.   Skin: Negative.    Neurological: Negative.    Psychiatric/Behavioral: Negative.    All other systems reviewed and are negative.      Objective   Physical Exam   Constitutional: He appears well-developed and well-nourished. No distress.   Cardiovascular: Normal rate, regular rhythm and normal heart sounds.   Pulmonary/Chest: Effort normal and breath sounds normal.   Musculoskeletal:        Arms:  Psychiatric: He has a normal mood and affect. His behavior is normal.   Nursing note and vitals reviewed.      Assessment/Plan   Norberto was seen today for back pain.    Diagnoses and all orders for this visit:    Chronic midline low back pain without sciatica    Hypertension, essential  -     atenolol  (TENORMIN) 100 MG tablet; Take 1 tablet by mouth Daily.  -     lisinopril (PRINIVIL,ZESTRIL) 40 MG tablet; Take 1 tablet by mouth Daily.    Left lateral epicondylitis      Will complete paperwork for being off work since 5/21/19 and he is seeing Dr. Saunders with plans from Dr. Saunders.  Will keep him off until July 15th.  Did tell pt that there is no way to guarantee when he will be mt to return to work, we may need to have Dr. Saunders help out with any further time off work in the future    Add atenolol for better BP control, recheck in one month    Ice, rest, tennis elbow brace for left elbow pain.  Pt agrees, f/u INB

## 2019-07-08 DIAGNOSIS — I10 HYPERTENSION, ESSENTIAL: ICD-10-CM

## 2019-07-09 RX ORDER — LISINOPRIL 40 MG/1
TABLET ORAL
Qty: 30 TABLET | Refills: 2 | OUTPATIENT
Start: 2019-07-09

## 2019-07-26 ENCOUNTER — APPOINTMENT (OUTPATIENT)
Dept: NUTRITION | Facility: HOSPITAL | Age: 44
End: 2019-07-26

## 2019-08-14 ENCOUNTER — TELEPHONE (OUTPATIENT)
Dept: NUTRITION | Facility: HOSPITAL | Age: 44
End: 2019-08-14

## 2019-08-14 NOTE — PROGRESS NOTES
"Adult Outpatient Nutrition  Assessment/PES    Patient Name:  Norberto Keita  YOB: 1975  MRN: [id number]    Assessment Date:  8/14/2019    Comments:  Patient was a no show for follow-up visit on 8/13/19. Called patient to reschedule. Patient requests call back in 2 weeks to reschedule. Quick phone follow-up also completed. Patient states he has been starting to incorporate exercise into his lifestyle. Patient states he started off well with tracking but has not been tracking much recently. Patient reports he is still being conscious of what he is eating and his calorie intake. Patient has been increasing his water intake but states he \"could do better\". Patient is unsure of current weight, but states that his clothes are fitting more loosely. Will reach back out to patient in 2 weeks to reschedule in-person follow-up.    Goal Completion:   -Exercise 30 minutes 3 days per week: 75%  -Track intake 3 days per week: 20%  -Drink 64 oz water 3 days per week: 40%  -Weight loss 1.8 kg (4 lbs) per month: 50%      Electronically signed by:  Mary Mccarty RD  08/14/19 11:54 AM  "

## 2019-08-16 ENCOUNTER — OFFICE VISIT (OUTPATIENT)
Dept: FAMILY MEDICINE CLINIC | Facility: CLINIC | Age: 44
End: 2019-08-16

## 2019-08-16 VITALS
RESPIRATION RATE: 18 BRPM | BODY MASS INDEX: 42.66 KG/M2 | HEART RATE: 78 BPM | TEMPERATURE: 98.6 F | DIASTOLIC BLOOD PRESSURE: 104 MMHG | SYSTOLIC BLOOD PRESSURE: 148 MMHG | HEIGHT: 72 IN | WEIGHT: 315 LBS

## 2019-08-16 DIAGNOSIS — R63.5 WEIGHT GAIN: ICD-10-CM

## 2019-08-16 DIAGNOSIS — I10 HYPERTENSION, ESSENTIAL: Primary | ICD-10-CM

## 2019-08-16 DIAGNOSIS — M54.50 ACUTE MIDLINE LOW BACK PAIN WITHOUT SCIATICA: ICD-10-CM

## 2019-08-16 PROCEDURE — 99214 OFFICE O/P EST MOD 30 MIN: CPT | Performed by: FAMILY MEDICINE

## 2019-08-16 RX ORDER — AMLODIPINE BESYLATE 10 MG/1
10 TABLET ORAL DAILY
Qty: 30 TABLET | Refills: 2 | Status: SHIPPED | OUTPATIENT
Start: 2019-08-16 | End: 2019-09-16 | Stop reason: SDUPTHER

## 2019-08-16 NOTE — PROGRESS NOTES
Subjective   Norberto Keita is a 43 y.o. male.     History of Present Illness     He is here to follow up with his back  He feels like it is better and PT has been beneficial   Would like to return to work  Seeing Dr. Saunders for this and they have discussed his weight  He feels his back is ready to return to work    BP is still an issue  He is taking his atenolol and lisinoppril   BP very high today    Weight is an issue, he asks about medicine for this  He has tried my fitness, pa, dietician, the whole 30 without great results  Hard to exercise with life and his back  Appetite is just an issue    The following portions of the patient's history were reviewed and updated as appropriate: allergies, current medications, past family history, past medical history, past social history, past surgical history and problem list.    Review of Systems   Constitutional: Negative.    HENT: Negative.    Eyes: Negative.    Respiratory: Negative.  Negative for shortness of breath.    Cardiovascular: Negative.  Negative for chest pain.   Gastrointestinal: Negative.    Musculoskeletal: Negative.  Negative for back pain.   Skin: Negative.    Neurological: Negative.    Psychiatric/Behavioral: Negative.    All other systems reviewed and are negative.      Objective   Physical Exam   Constitutional: He is oriented to person, place, and time. He appears well-developed and well-nourished. No distress.   Cardiovascular: Normal rate, regular rhythm and normal heart sounds.   Pulmonary/Chest: Effort normal and breath sounds normal.   Neurological: He is alert and oriented to person, place, and time.   Psychiatric: He has a normal mood and affect. His behavior is normal. Judgment and thought content normal.   Nursing note and vitals reviewed.      Assessment/Plan   Norberto was seen today for back pain.    Diagnoses and all orders for this visit:    Hypertension, essential  -     amLODIPine (NORVASC) 10 MG tablet; Take 1 tablet by mouth  Daily.    Weight gain    Acute midline low back pain without sciatica    BP is still high, will add norvasc 10 to lisinopril and atenolol.  Recheck in 2 months  He feels like his back is doing better.  He has not seen Dr. Saunders recently  He is ready to go back to work.  I wrote RTW note for pt today but was honestly not aware he had been off all this time.  We had discussed short term off work last time and then Dr. Saunders would give him further time off, not sure if that happened or what.  Ok to RTW at this time  Discussed diet and exercise for weight, not a candidate for phentermine due to BP at this time, will discuss at follow up in 2 months but he has to make lifestyle changes to have success

## 2019-09-16 ENCOUNTER — OFFICE VISIT (OUTPATIENT)
Dept: FAMILY MEDICINE CLINIC | Facility: CLINIC | Age: 44
End: 2019-09-16

## 2019-09-16 VITALS
RESPIRATION RATE: 16 BRPM | HEART RATE: 78 BPM | DIASTOLIC BLOOD PRESSURE: 100 MMHG | WEIGHT: 315 LBS | BODY MASS INDEX: 42.66 KG/M2 | SYSTOLIC BLOOD PRESSURE: 132 MMHG | TEMPERATURE: 97.1 F | HEIGHT: 72 IN

## 2019-09-16 DIAGNOSIS — I10 HYPERTENSION, ESSENTIAL: Primary | ICD-10-CM

## 2019-09-16 DIAGNOSIS — E66.01 MORBID OBESITY WITH BMI OF 45.0-49.9, ADULT (HCC): ICD-10-CM

## 2019-09-16 PROCEDURE — 99214 OFFICE O/P EST MOD 30 MIN: CPT | Performed by: FAMILY MEDICINE

## 2019-09-16 RX ORDER — LISINOPRIL 40 MG/1
40 TABLET ORAL DAILY
Qty: 90 TABLET | Refills: 1 | Status: SHIPPED | OUTPATIENT
Start: 2019-09-16 | End: 2020-07-24 | Stop reason: SDUPTHER

## 2019-09-16 RX ORDER — PHENTERMINE HYDROCHLORIDE 37.5 MG/1
37.5 TABLET ORAL
Qty: 30 TABLET | Refills: 1 | Status: SHIPPED | OUTPATIENT
Start: 2019-09-16 | End: 2019-11-22

## 2019-09-16 RX ORDER — AMLODIPINE BESYLATE 10 MG/1
10 TABLET ORAL DAILY
Qty: 90 TABLET | Refills: 1 | Status: SHIPPED | OUTPATIENT
Start: 2019-09-16 | End: 2020-07-24 | Stop reason: SDUPTHER

## 2019-09-16 RX ORDER — ATENOLOL 100 MG/1
100 TABLET ORAL DAILY
Qty: 90 TABLET | Refills: 1 | Status: SHIPPED | OUTPATIENT
Start: 2019-09-16 | End: 2020-07-24 | Stop reason: SDUPTHER

## 2019-09-16 NOTE — PROGRESS NOTES
Subjective   Norberto Keita is a 43 y.o. male.     History of Present Illness     He is here to recheck his BP after starting the norvasc last time  No SE with this medicine  He thinks his BP numbers have been a little better at home and has been checking on an infrequent basis  No     He is motivated to lose weight  Wants to try medicine.  They did buy the fast metabolism diet books and are thinking about starting this program, but have not started to this point.  He has thought about exercise, but has not started that either  We discussed the importance of lifestyle changes for success in losing weight    The following portions of the patient's history were reviewed and updated as appropriate: allergies, current medications, past family history, past medical history, past social history, past surgical history and problem list.    Review of Systems   Constitutional: Negative.    HENT: Negative.    Eyes: Negative.    Respiratory: Negative.  Negative for shortness of breath.    Cardiovascular: Negative.  Negative for chest pain and leg swelling.   Gastrointestinal: Negative.    Musculoskeletal: Negative.    Skin: Negative.    Neurological: Negative.    Psychiatric/Behavioral: Negative.    All other systems reviewed and are negative.       Objective   Physical Exam   Constitutional: He is oriented to person, place, and time. He appears well-developed and well-nourished. No distress.   Cardiovascular: Normal rate, regular rhythm and normal heart sounds.   Pulmonary/Chest: Effort normal and breath sounds normal.   Neurological: He is alert and oriented to person, place, and time.   Psychiatric: He has a normal mood and affect. His behavior is normal. Judgment and thought content normal.   Nursing note and vitals reviewed.      Assessment/Plan   Norberto was seen today for hypertension and med refill.    Diagnoses and all orders for this visit:    Hypertension, essential  -     amLODIPine (NORVASC) 10 MG tablet; Take 1  tablet by mouth Daily.  -     atenolol (TENORMIN) 100 MG tablet; Take 1 tablet by mouth Daily.  -     lisinopril (PRINIVIL,ZESTRIL) 40 MG tablet; Take 1 tablet by mouth Daily.    Morbid obesity with BMI of 45.0-49.9, adult (CMS/[facility])  -     phentermine (ADIPEX-P) 37.5 MG tablet; Take 1 tablet by mouth Every Morning Before Breakfast.    BP is slightly improved, will continue regimen of norvasc, atenolol and lisinopril.  Discussed the importance of adhering to these meds as well as implementing lifestyle changes to best improve his BP.  Diet and exercise strongly encouraged.  Ok to try phentermine for a month and pros/cons/SE discussed with pt.  Will recheck weight in 2 months.

## 2019-10-07 DIAGNOSIS — I10 HYPERTENSION, ESSENTIAL: ICD-10-CM

## 2019-10-07 RX ORDER — LISINOPRIL 40 MG/1
40 TABLET ORAL DAILY
Qty: 90 TABLET | Refills: 1 | OUTPATIENT
Start: 2019-10-07

## 2019-10-07 NOTE — TELEPHONE ENCOUNTER
juju     Medication refill     lisinopril (PRINIVIL,ZESTRIL) 40 MG tablet       [address] - [phone]  - [phone] FX

## 2019-10-15 NOTE — TELEPHONE ENCOUNTER
Patient informed, verbalized a good understanding   Immunohistochemistry ([id number] and [id number]) billing is not performed here. Please use the Immunohistochemistry Stain Billing plan to accomplish this. Immunohistochemistry ([id number] and [id number]) billing is not performed here. Please use the Immunohistochemistry Stain Billing plan to accomplish this.

## 2019-11-22 ENCOUNTER — OFFICE VISIT (OUTPATIENT)
Dept: FAMILY MEDICINE CLINIC | Facility: CLINIC | Age: 44
End: 2019-11-22

## 2019-11-22 VITALS
HEIGHT: 72 IN | BODY MASS INDEX: 42.66 KG/M2 | DIASTOLIC BLOOD PRESSURE: 84 MMHG | RESPIRATION RATE: 18 BRPM | SYSTOLIC BLOOD PRESSURE: 124 MMHG | TEMPERATURE: 97.2 F | HEART RATE: 80 BPM | WEIGHT: 315 LBS

## 2019-11-22 DIAGNOSIS — M77.11 RIGHT LATERAL EPICONDYLITIS: ICD-10-CM

## 2019-11-22 DIAGNOSIS — I10 HYPERTENSION, ESSENTIAL: Primary | ICD-10-CM

## 2019-11-22 PROCEDURE — 99213 OFFICE O/P EST LOW 20 MIN: CPT | Performed by: FAMILY MEDICINE

## 2019-11-22 NOTE — PROGRESS NOTES
Subjective   Norberto Keita is a 43 y.o. male.     History of Present Illness     Norberto Keita  is here for follow-up of hypertension of several years duration. He is not exercising and is not adherent to a low-salt diet. Patient does check his blood pressure. It is well controlled at home. Patient denies chest pain and palpitations. Cardiovascular risk factors: hypertension, male gender and obesity (BMI >= 30 kg/m2). He is compliant with meds.      Pain in right elbow  Sharp, nonradiating  Any movement of the arm and hand hurts at his lateral right elbow  No known injury          Review of Systems   Constitutional: Negative.    Musculoskeletal:        Elbow       Objective   Physical Exam   Constitutional: He appears well-developed and well-nourished. No distress.   Cardiovascular: Normal rate, regular rhythm and normal heart sounds.   Pulmonary/Chest: Effort normal and breath sounds normal.   Psychiatric: He has a normal mood and affect. His behavior is normal.   Nursing note and vitals reviewed.      Assessment/Plan   Norberto was seen today for hypertension.    Diagnoses and all orders for this visit:    Hypertension, essential    Right lateral epicondylitis    good control of BP, continue medicine and he has refills available    He did not lose weight with phentermine, cannot refill it    Tennis elbow brace for right elbow, could do injection in future, he will f/u INB

## 2019-12-10 ENCOUNTER — TELEPHONE (OUTPATIENT)
Dept: FAMILY MEDICINE CLINIC | Facility: CLINIC | Age: 44
End: 2019-12-10

## 2019-12-10 NOTE — TELEPHONE ENCOUNTER
TOM;PT CALLED  PT FEEL HIS BP MEDS ARE  CAUSING JT PAIN; REYES; DIZZINESS AND WATER RETENTION IN FEET LIKE A GOUT FLARE-  HE FEELS IT IS PROBABLY FROM TAKING ATENOLOL -HOW DOES HE   NEED TO CHANGE OR DISCONTINUE IT?    [phone]

## 2019-12-10 NOTE — TELEPHONE ENCOUNTER
Most likely to cause fluid retention is the amlodipine.  Lets stop it for 3 weeks and see how he does.  If symptoms still present resume it and then stop atenolol for 3 weeks.  F/u in 6 weeks to re-evaluate

## 2020-04-15 ENCOUNTER — TELEPHONE (OUTPATIENT)
Dept: FAMILY MEDICINE CLINIC | Facility: CLINIC | Age: 45
End: 2020-04-15

## 2020-04-15 RX ORDER — BROMPHENIRAMINE MALEATE, PSEUDOEPHEDRINE HYDROCHLORIDE, AND DEXTROMETHORPHAN HYDROBROMIDE 2; 30; 10 MG/5ML; MG/5ML; MG/5ML
5 SYRUP ORAL 4 TIMES DAILY PRN
Qty: 180 ML | Refills: 0 | Status: SHIPPED | OUTPATIENT
Start: 2020-04-15 | End: 2020-08-24

## 2020-04-15 NOTE — TELEPHONE ENCOUNTER
Called pt for more information. Cough has been coming & going for the past few months. Denies any fever. Sometimes he will have production with cough but usually clear. Pt has tried Robitussin for this only.

## 2020-04-15 NOTE — TELEPHONE ENCOUNTER
PT CALLED STATED THAT HE HAS HAD A COUGH FOR A COUPLE MONTHS NOW AND REQUEST FOR DR SEND HIM OVER A RX TO TAKE CARE OF COUGH.    PLEASE ADVISE.  CALL BACK:[phone]       [facility] - [place], [place] - [address]

## 2020-07-17 ENCOUNTER — TELEMEDICINE (OUTPATIENT)
Dept: FAMILY MEDICINE CLINIC | Facility: CLINIC | Age: 45
End: 2020-07-17

## 2020-07-17 DIAGNOSIS — M10.072 ACUTE IDIOPATHIC GOUT OF LEFT ANKLE: Primary | ICD-10-CM

## 2020-07-17 PROCEDURE — 99213 OFFICE O/P EST LOW 20 MIN: CPT | Performed by: NURSE PRACTITIONER

## 2020-07-17 RX ORDER — PREDNISONE 20 MG/1
TABLET ORAL
Qty: 10 TABLET | Refills: 0 | Status: SHIPPED | OUTPATIENT
Start: 2020-07-17 | End: 2020-08-24

## 2020-07-17 NOTE — PATIENT INSTRUCTIONS
Gout    Gout is a condition that causes painful swelling of the joints. Gout is a type of inflammation of the joints (arthritis). This condition is caused by having too much uric acid in the body. Uric acid is a chemical that forms when the body breaks down substances called purines. Purines are important for building body proteins.  When the body has too much uric acid, sharp crystals can form and build up inside the joints. This causes pain and swelling. Gout attacks can happen quickly and may be very painful (acute gout). Over time, the attacks can affect more joints and become more frequent (chronic gout). Gout can also cause uric acid to build up under the skin and inside the kidneys.  What are the causes?  This condition is caused by too much uric acid in your blood. This can happen because:  · Your kidneys do not remove enough uric acid from your blood. This is the most common cause.  · Your body makes too much uric acid. This can happen with some cancers and cancer treatments. It can also occur if your body is breaking down too many red blood cells (hemolytic anemia).  · You eat too many foods that are high in purines. These foods include organ meats and some seafood. Alcohol, especially beer, is also high in purines.  A gout attack may be triggered by trauma or stress.  What increases the risk?  You are more likely to develop this condition if you:  · Have a family history of gout.  · Are male and middle-aged.  · Are female and have gone through menopause.  · Are obese.  · Frequently drink alcohol, especially beer.  · Are dehydrated.  · Lose weight too quickly.  · Have an organ transplant.  · Have lead poisoning.  · Take certain medicines, including aspirin, cyclosporine, diuretics, levodopa, and niacin.  · Have kidney disease.  · Have a skin condition called psoriasis.  What are the signs or symptoms?  An attack of acute gout happens quickly. It usually occurs in just one joint. The most common place is  the big toe. Attacks often start at night. Other joints that may be affected include joints of the feet, ankle, knee, fingers, wrist, or elbow. Symptoms of this condition may include:  · Severe pain.  · Warmth.  · Swelling.  · Stiffness.  · Tenderness. The affected joint may be very painful to touch.  · Shiny, red, or purple skin.  · Chills and fever.  Chronic gout may cause symptoms more frequently. More joints may be involved. You may also have white or yellow lumps (tophi) on your hands or feet or in other areas near your joints.  How is this diagnosed?  This condition is diagnosed based on your symptoms, medical history, and physical exam. You may have tests, such as:  · Blood tests to measure uric acid levels.  · Removal of joint fluid with a thin needle (aspiration) to look for uric acid crystals.  · X-rays to look for joint damage.  How is this treated?  Treatment for this condition has two phases: treating an acute attack and preventing future attacks. Acute gout treatment may include medicines to reduce pain and swelling, including:  · NSAIDs.  · Steroids. These are strong anti-inflammatory medicines that can be taken by mouth (orally) or injected into a joint.  · Colchicine. This medicine relieves pain and swelling when it is taken soon after an attack. It can be given by mouth or through an IV.  Preventive treatment may include:  · Daily use of smaller doses of NSAIDs or colchicine.  · Use of a medicine that reduces uric acid levels in your blood.  · Changes to your diet. You may need to see a dietitian about what to eat and drink to prevent gout.  Follow these instructions at home:  During a gout attack    · If directed, put ice on the affected area:  ? Put ice in a plastic bag.  ? Place a towel between your skin and the bag.  ? Leave the ice on for 20 minutes, 2-3 times a day.  · Raise (elevate) the affected joint above the level of your heart as often as possible.  · Rest the joint as much as possible.  If the affected joint is in your leg, you may be given crutches to use.  · Follow instructions from your health care provider about eating or drinking restrictions.  Avoiding future gout attacks  · Follow a low-purine diet as told by your dietitian or health care provider. Avoid foods and drinks that are high in purines, including liver, kidney, anchovies, asparagus, herring, mushrooms, mussels, and beer.  · Maintain a healthy weight or lose weight if you are overweight. If you want to lose weight, talk with your health care provider. It is important that you do not lose weight too quickly.  · Start or maintain an exercise program as told by your health care provider.  Eating and drinking  · Drink enough fluids to keep your urine pale yellow.  · If you drink alcohol:  ? Limit how much you use to:  § 0-1 drink a day for women.  § 0-2 drinks a day for men.  ? Be aware of how much alcohol is in your drink. In the [place] one drink equals one 12 oz bottle of beer (355 mL) one 5 oz glass of wine (148 mL), or one 1½ oz glass of hard liquor (44 mL).  General instructions  · Take over-the-counter and prescription medicines only as told by your health care provider.  · Do not drive or use heavy machinery while taking prescription pain medicine.  · Return to your normal activities as told by your health care provider. Ask your health care provider what activities are safe for you.  · Keep all follow-up visits as told by your health care provider. This is important.  Contact a health care provider if you have:  · Another gout attack.  · Continuing symptoms of a gout attack after 10 days of treatment.  · Side effects from your medicines.  · Chills or a fever.  · Burning pain when you urinate.  · Pain in your lower back or belly.  Get help right away if you:  · Have severe or uncontrolled pain.  · Cannot urinate.  Summary  · Gout is painful swelling of the joints caused by inflammation.  · The most common site of pain is the big  toe, but it can affect other joints in the body.  · Medicines and dietary changes can help to prevent and treat gout attacks.  This information is not intended to replace advice given to you by your health care provider. Make sure you discuss any questions you have with your health care provider.  Document Released: 12/15/2001 Document Revised: 07/10/2019 Document Reviewed: 07/10/2019  [employer] Patient Education © 2020 [employer]    Low-Purine Eating Plan  A low-purine eating plan involves making food choices to limit your intake of purine. Purine is a kind of uric acid. Too much uric acid in your blood can cause certain conditions, such as gout and kidney stones. Eating a low-purine diet can help control these conditions.  What are tips for following this plan?  Reading food labels    · Avoid foods with saturated or Trans fat.  · Check the ingredient list of grains-based foods, such as bread and cereal, to make sure that they contain whole grains.  · Check the ingredient list of sauces or soups to make sure they do not contain meat or fish.  · When choosing soft drinks, check the ingredient list to make sure they do not contain high-fructose corn syrup.  Shopping  · Buy plenty of fresh fruits and vegetables.  · Avoid buying canned or fresh fish.  · Buy dairy products labeled as low-fat or nonfat.  · Avoid buying premade or processed foods. These foods are often high in fat, salt (sodium), and added sugar.  Cooking  · Use olive oil instead of butter when cooking. Oils like olive oil, canola oil, and sunflower oil contain healthy fats.  Meal planning  · Learn which foods do or do not affect you. If you find out that a food tends to cause your gout symptoms to flare up, avoid eating that food. You can enjoy foods that do not cause problems. If you have any questions about a food item, talk with your dietitian or health care provider.  · Limit foods high in fat, especially saturated fat. Fat makes it harder for your  body to get rid of uric acid.  · Choose foods that are lower in fat and are lean sources of protein.  General guidelines  · Limit alcohol intake to no more than 1 drink a day for nonpregnant women and 2 drinks a day for men. One drink equals 12 oz of beer, 5 oz of wine, or 1½ oz of hard liquor. Alcohol can affect the way your body gets rid of uric acid.  · Drink plenty of water to keep your urine clear or pale yellow. Fluids can help remove uric acid from your body.  · If directed by your health care provider, take a vitamin C supplement.  · Work with your health care provider and dietitian to develop a plan to achieve or maintain a healthy weight. Losing weight can help reduce uric acid in your blood.  What foods are recommended?  The items listed may not be a complete list. Talk with your dietitian about what dietary choices are best for you.  Foods low in purines  Foods low in purines do not need to be limited. These include:  · All fruits.  · All low-purine vegetables, pickles, and olives.  · Breads, pasta, rice, cornbread, and popcorn. Cake and other baked goods.  · All dairy foods.  · Eggs, nuts, and nut butters.  · Spices and condiments, such as salt, herbs, and vinegar.  · Plant oils, butter, and margarine.  · Water, sugar-free soft drinks, tea, coffee, and cocoa.  · Vegetable-based soups, broths, sauces, and gravies.  Foods moderate in purines  Foods moderate in purines should be limited to the amounts listed.  · ½ cup of asparagus, cauliflower, spinach, mushrooms, or green peas, each day.  · 2/3 cup uncooked oatmeal, each day.  · ¼ cup dry wheat bran or wheat germ, each day.  · 2-3 ounces of meat or poultry, each day.  · 4-6 ounces of shellfish, such as crab, lobster, oysters, or shrimp, each day.  · 1 cup cooked beans, peas, or lentils, each day.  · Soup, broths, or bouillon made from meat or fish. Limit these foods as much as possible.  What foods are not recommended?  The items listed may not be a  complete list. Talk with your dietitian about what dietary choices are best for you.  Limit your intake of foods high in purines, including:  · Beer and other alcohol.  · Meat-based gravy or sauce.  · Canned or fresh fish, such as:  ? Anchovies, sardines, herring, and tuna.  ? Mussels and scallops.  ? Codfish, trout, and declan.  · Lilly.  · Organ meats, such as:  ? Liver or kidney.  ? Tripe.  ? Sweetbreads (thymus gland or pancreas).  · Wild game or goose.  · Yeast or yeast extract supplements.  · Drinks sweetened with high-fructose corn syrup.  Summary  · Eating a low-purine diet can help control conditions caused by too much uric acid in the body, such as gout or kidney stones.  · Choose low-purine foods, limit alcohol, and limit foods high in fat.  · You will learn over time which foods do or do not affect you. If you find out that a food tends to cause your gout symptoms to flare up, avoid eating that food.  This information is not intended to replace advice given to you by your health care provider. Make sure you discuss any questions you have with your health care provider.  Document Released: 04/13/2012 Document Revised: 11/30/2018 Document Reviewed: 01/31/2018  Elsevier Patient Education © 2020 [employer].

## 2020-07-17 NOTE — PROGRESS NOTES
You have chosen to receive care through a telehealth visit.  Do you consent to use a video/audio connection for your medical care today? Yes  Visit lasted 11 minutes    HPI: Acute gout attack  Pain in left ankle that started at 4 am awakened by pain. No redness or swelling, no accident or injury to ankle. Pain with standing or moving of ankle or foot. Previous gout attacks in toe. Ate a big steak and some alcohol the night before.   Taking aleve and Ibuprofen seems to help some     ROS: as above  Physical Exam   Constitutional: He appears well-developed and well-nourished. No distress.   HENT:   Head: Normocephalic.   Right Ear: External ear normal.   Left Ear: External ear normal.   Nose: Nose normal.   Neck: Neck normal appearance.  Pulmonary/Chest: Effort normal.   Abdominal: Abdomen appears normal.   Musculoskeletal: Normal range of motion.         General: Tenderness present. No no effusion.   Neurological: He is alert.   Skin: Skin is dry. No rash noted. He is not diaphoretic. No erythema.   Psychiatric: He has a normal mood and affect.     DX: acute gout attack    Plan: Prednisone 20 mg BID take with food to avoid stomach pain.   Avoid alcohol and rich foods, steaks, burgers which can result in acute gout attacks.   Recommend pt make appt for fasting labs and physical since it has been since 2018 since last blood work. Pt agrees.

## 2020-07-21 ENCOUNTER — TELEPHONE (OUTPATIENT)
Dept: FAMILY MEDICINE CLINIC | Facility: CLINIC | Age: 45
End: 2020-07-21

## 2020-07-21 DIAGNOSIS — I10 HYPERTENSION, ESSENTIAL: ICD-10-CM

## 2020-07-21 RX ORDER — ATENOLOL 100 MG/1
TABLET ORAL
Qty: 90 TABLET | Refills: 0 | OUTPATIENT
Start: 2020-07-21

## 2020-07-21 RX ORDER — LISINOPRIL 40 MG/1
TABLET ORAL
Qty: 90 TABLET | Refills: 0 | OUTPATIENT
Start: 2020-07-21

## 2020-07-21 RX ORDER — AMLODIPINE BESYLATE 10 MG/1
TABLET ORAL
Qty: 90 TABLET | Refills: 0 | OUTPATIENT
Start: 2020-07-21

## 2020-07-22 NOTE — TELEPHONE ENCOUNTER
PT WIFE CALLED TO   REQUEST REFILL FOR RX'S BUT HAD CAME IN FOR APPT LAST WEEK.    PLEASE ADVISE.  CALL BACK:[phone]

## 2020-07-24 ENCOUNTER — TELEPHONE (OUTPATIENT)
Dept: FAMILY MEDICINE CLINIC | Facility: CLINIC | Age: 45
End: 2020-07-24

## 2020-07-24 DIAGNOSIS — I10 HYPERTENSION, ESSENTIAL: ICD-10-CM

## 2020-07-24 RX ORDER — ATENOLOL 100 MG/1
TABLET ORAL
Qty: 90 TABLET | Refills: 0 | OUTPATIENT
Start: 2020-07-24

## 2020-07-24 RX ORDER — AMLODIPINE BESYLATE 10 MG/1
10 TABLET ORAL DAILY
Qty: 30 TABLET | Refills: 1 | Status: SHIPPED | OUTPATIENT
Start: 2020-07-24 | End: 2020-08-24 | Stop reason: SDUPTHER

## 2020-07-24 RX ORDER — AMLODIPINE BESYLATE 10 MG/1
TABLET ORAL
Qty: 90 TABLET | Refills: 0 | OUTPATIENT
Start: 2020-07-24

## 2020-07-24 RX ORDER — ATENOLOL 100 MG/1
100 TABLET ORAL DAILY
Qty: 90 TABLET | Refills: 1 | OUTPATIENT
Start: 2020-07-24

## 2020-07-24 RX ORDER — LISINOPRIL 40 MG/1
40 TABLET ORAL DAILY
Qty: 30 TABLET | Refills: 1 | Status: SHIPPED | OUTPATIENT
Start: 2020-07-24 | End: 2020-08-24 | Stop reason: SDUPTHER

## 2020-07-24 RX ORDER — ATENOLOL 100 MG/1
100 TABLET ORAL DAILY
Qty: 30 TABLET | Refills: 1 | Status: SHIPPED | OUTPATIENT
Start: 2020-07-24 | End: 2020-08-24 | Stop reason: SDUPTHER

## 2020-07-24 RX ORDER — LISINOPRIL 40 MG/1
TABLET ORAL
Qty: 90 TABLET | Refills: 0 | OUTPATIENT
Start: 2020-07-24

## 2020-07-24 RX ORDER — AMLODIPINE BESYLATE 10 MG/1
10 TABLET ORAL DAILY
Qty: 90 TABLET | Refills: 1 | OUTPATIENT
Start: 2020-07-24

## 2020-07-24 RX ORDER — LISINOPRIL 40 MG/1
40 TABLET ORAL DAILY
Qty: 90 TABLET | Refills: 1 | OUTPATIENT
Start: 2020-07-24

## 2020-07-24 NOTE — TELEPHONE ENCOUNTER
Dr Cheung denied meds since he has not seen him in a while and Mr Neela was told on 7/17 video visit that he needs to make an office visit for labs and a physical (no blood work on file since 2018). I will send in 2 month supply but then pt needs to make an office visit. [facility]

## 2020-07-24 NOTE — TELEPHONE ENCOUNTER
Patient's wife, Dorene, states that he has requested medicine, but it was rejected.  He just had an appt with Jinny Conde.  She can be reached at [phone]

## 2020-08-24 ENCOUNTER — OFFICE VISIT (OUTPATIENT)
Dept: FAMILY MEDICINE CLINIC | Facility: CLINIC | Age: 45
End: 2020-08-24

## 2020-08-24 VITALS
HEIGHT: 72 IN | SYSTOLIC BLOOD PRESSURE: 130 MMHG | BODY MASS INDEX: 42.66 KG/M2 | RESPIRATION RATE: 18 BRPM | DIASTOLIC BLOOD PRESSURE: 110 MMHG | HEART RATE: 78 BPM | TEMPERATURE: 97.8 F | WEIGHT: 315 LBS

## 2020-08-24 DIAGNOSIS — I10 HYPERTENSION, ESSENTIAL: Primary | ICD-10-CM

## 2020-08-24 DIAGNOSIS — R40.0 DAYTIME SOMNOLENCE: ICD-10-CM

## 2020-08-24 DIAGNOSIS — E66.01 CLASS 3 SEVERE OBESITY DUE TO EXCESS CALORIES WITH SERIOUS COMORBIDITY AND BODY MASS INDEX (BMI) OF 40.0 TO 44.9 IN ADULT (HCC): ICD-10-CM

## 2020-08-24 DIAGNOSIS — R05.9 COUGH: ICD-10-CM

## 2020-08-24 DIAGNOSIS — F41.1 GENERALIZED ANXIETY DISORDER: ICD-10-CM

## 2020-08-24 DIAGNOSIS — Z82.49 FAMILY HISTORY OF CORONARY ARTERY DISEASE: ICD-10-CM

## 2020-08-24 DIAGNOSIS — M10.072 ACUTE IDIOPATHIC GOUT OF LEFT ANKLE: ICD-10-CM

## 2020-08-24 DIAGNOSIS — I10 HYPERTENSION, ESSENTIAL: ICD-10-CM

## 2020-08-24 DIAGNOSIS — Z00.00 WELL ADULT EXAM: Primary | ICD-10-CM

## 2020-08-24 DIAGNOSIS — G47.30 OBSERVED SLEEP APNEA: ICD-10-CM

## 2020-08-24 PROCEDURE — 99396 PREV VISIT EST AGE 40-64: CPT | Performed by: FAMILY MEDICINE

## 2020-08-24 RX ORDER — ESCITALOPRAM OXALATE 10 MG/1
10 TABLET ORAL DAILY
Qty: 30 TABLET | Refills: 2 | Status: SHIPPED | OUTPATIENT
Start: 2020-08-24 | End: 2021-03-30

## 2020-08-24 RX ORDER — LORATADINE 10 MG/1
10 TABLET ORAL DAILY
Qty: 30 TABLET | Refills: 3 | Status: SHIPPED | OUTPATIENT
Start: 2020-08-24 | End: 2021-06-30

## 2020-08-24 RX ORDER — ATENOLOL 100 MG/1
100 TABLET ORAL DAILY
Qty: 90 TABLET | Refills: 1 | Status: SHIPPED | OUTPATIENT
Start: 2020-08-24 | End: 2021-03-30 | Stop reason: SDUPTHER

## 2020-08-24 RX ORDER — LISINOPRIL 40 MG/1
40 TABLET ORAL DAILY
Qty: 90 TABLET | Refills: 1 | Status: SHIPPED | OUTPATIENT
Start: 2020-08-24 | End: 2021-03-30 | Stop reason: SDUPTHER

## 2020-08-24 RX ORDER — AMLODIPINE BESYLATE 10 MG/1
10 TABLET ORAL DAILY
Qty: 90 TABLET | Refills: 1 | Status: SHIPPED | OUTPATIENT
Start: 2020-08-24 | End: 2021-03-30 | Stop reason: SDUPTHER

## 2020-08-24 RX ORDER — BROMPHENIRAMINE MALEATE, PSEUDOEPHEDRINE HYDROCHLORIDE, AND DEXTROMETHORPHAN HYDROBROMIDE 2; 30; 10 MG/5ML; MG/5ML; MG/5ML
5 SYRUP ORAL 4 TIMES DAILY PRN
Qty: 180 ML | Refills: 0 | Status: SHIPPED | OUTPATIENT
Start: 2020-08-24 | End: 2021-03-30

## 2020-08-24 NOTE — PROGRESS NOTES
Subjective   Norberto Keita is a 44 y.o. male.     History of Present Illness     Norberto Keita 44 y.o. male who presents for yearly preventive exam.  His overall health is: good  He exercises none..  He does see his dentist regularly  His diet is typical american  He describes his alcohol intake as 2-3 a night  He knows what his cholesterol is No  He is sexually active  He does have ED or other bedroom issues  Does patient smoke No  If so how long?    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  He just wants to see a cardiologist to make sure no issues with his heart  His dad has had CAD and triple bypass as well      The following portions of the patient's history were reviewed and updated as appropriate: allergies, current medications, past family history, past medical history, past social history, past surgical history and problem list.    Review of Systems   Constitutional: Negative.    HENT: Negative.    Eyes: Negative.    Respiratory: Negative.  Negative for shortness of breath.    Cardiovascular: Negative.  Negative for chest pain.   Gastrointestinal: Negative.    Musculoskeletal: Negative.    Skin: Negative.    Neurological: Negative.    Psychiatric/Behavioral: Positive for dysphoric mood. The patient is nervous/anxious.    All other systems reviewed and are negative.      Objective   Physical Exam   Constitutional: He is oriented to person, place, and time. He appears well-developed and well-nourished. No distress.   obese   HENT:   Head: Normocephalic and atraumatic.   Right Ear: Tympanic membrane, external ear and ear canal normal.   Left Ear: Tympanic membrane, external ear and ear canal normal.    Nose: Nose normal.   Eyes: Conjunctivae and EOM are normal.   Neck: Normal range of motion. Neck supple. No thyromegaly present.   Cardiovascular: Normal rate, regular rhythm and normal heart sounds.   No murmur heard.  Pulmonary/Chest: Effort normal and breath sounds normal. No respiratory distress.   Abdominal: Soft. Bowel sounds are normal. He exhibits no distension and no mass. There is no tenderness.   Lymphadenopathy:     He has no cervical adenopathy.   Neurological: He is alert and oriented to person, place, and time.   Skin: Skin is warm and dry.   Psychiatric: He has a normal mood and affect. His behavior is normal. Judgment and thought content normal.   Nursing note and vitals reviewed.      Assessment/Plan   Norberto was seen today for hypertension.    Diagnoses and all orders for this visit:    Well adult exam    Class 3 severe obesity due to excess calories with serious comorbidity and body mass index (BMI) of 40.0 to 44.9 in adult (CMS/[facility])    Hypertension, essential  -     amLODIPine (Norvasc) 10 MG tablet; Take 1 tablet by mouth Daily.  -     atenolol (Tenormin) 100 MG tablet; Take 1 tablet by mouth Daily.  -     lisinopril (PRINIVIL,ZESTRIL) 40 MG tablet; Take 1 tablet by mouth Daily.  -     CBC & Differential  -     Comprehensive Metabolic Panel  -     Uric Acid  -     Lipid Panel    Family history of coronary artery disease  -     Ambulatory Referral to Cardiology    Generalized anxiety disorder  -     escitalopram (Lexapro) 10 MG tablet; Take 1 tablet by mouth Daily.    Cough  -     brompheniramine-pseudoephedrine-DM 30-2-10 MG/5ML syrup; Take 5 mL by mouth 4 (Four) Times a Day As Needed for Cough.  -     loratadine (Claritin) 10 MG tablet; Take 1 tablet by mouth Daily.    Acute idiopathic gout of left ankle  -     Uric Acid    Observed sleep apnea  -     Home Sleep Study; Future    Daytime somnolence  -     Home Sleep Study; Future    counseled pt about well adult care, diet, exercise, and  weight loss recommended.  The various health benefits also discussed    BP is high today, will continue medicine, use lifestyle changes and check sleep study as uncontrolled sleep apnea can contribute to HTN.  Will f/u pending sleep study.  Will set him up with cardiology due to family history of CAD.  Ok claritin for PND and cough.  Will start lexapro for mood.  Pros/cons/SE discussed with pt, will recheck in 6 weeks along with weight and BP

## 2020-08-25 LAB
ALBUMIN SERPL-MCNC: 4.8 G/DL (ref 4–5)
ALBUMIN/GLOB SERPL: 1.7 {RATIO} (ref 1.2–2.2)
ALP SERPL-CCNC: 86 IU/L (ref 39–117)
ALT SERPL-CCNC: 39 IU/L (ref 0–44)
AST SERPL-CCNC: 26 IU/L (ref 0–40)
BASOPHILS # BLD AUTO: 0.1 X10E3/UL (ref 0–0.2)
BASOPHILS NFR BLD AUTO: 2 %
BILIRUB SERPL-MCNC: 0.9 MG/DL (ref 0–1.2)
BUN SERPL-MCNC: 13 MG/DL (ref 6–24)
BUN/CREAT SERPL: 12 (ref 9–20)
CALCIUM SERPL-MCNC: 9.6 MG/DL (ref 8.7–10.2)
CHLORIDE SERPL-SCNC: 99 MMOL/L (ref 96–106)
CHOLEST SERPL-MCNC: 239 MG/DL (ref 100–199)
CO2 SERPL-SCNC: 28 MMOL/L (ref 20–29)
CREAT SERPL-MCNC: 1.06 MG/DL (ref 0.76–1.27)
EOSINOPHIL # BLD AUTO: 0.2 X10E3/UL (ref 0–0.4)
EOSINOPHIL NFR BLD AUTO: 3 %
ERYTHROCYTE [DISTWIDTH] IN BLOOD BY AUTOMATED COUNT: 12.6 % (ref 11.6–15.4)
GLOBULIN SER CALC-MCNC: 2.8 G/DL (ref 1.5–4.5)
GLUCOSE SERPL-MCNC: 111 MG/DL (ref 65–99)
HCT VFR BLD AUTO: 54 % (ref 37.5–51)
HDLC SERPL-MCNC: 41 MG/DL
HGB BLD-MCNC: 17.7 G/DL (ref 13–17.7)
IMM GRANULOCYTES # BLD AUTO: 0.1 X10E3/UL (ref 0–0.1)
IMM GRANULOCYTES NFR BLD AUTO: 1 %
LDLC SERPL CALC-MCNC: 157 MG/DL (ref 0–99)
LYMPHOCYTES # BLD AUTO: 1.8 X10E3/UL (ref 0.7–3.1)
LYMPHOCYTES NFR BLD AUTO: 24 %
MCH RBC QN AUTO: 30.1 PG (ref 26.6–33)
MCHC RBC AUTO-ENTMCNC: 32.8 G/DL (ref 31.5–35.7)
MCV RBC AUTO: 92 FL (ref 79–97)
MONOCYTES # BLD AUTO: 0.5 X10E3/UL (ref 0.1–0.9)
MONOCYTES NFR BLD AUTO: 7 %
NEUTROPHILS # BLD AUTO: 4.8 X10E3/UL (ref 1.4–7)
NEUTROPHILS NFR BLD AUTO: 63 %
PLATELET # BLD AUTO: 207 X10E3/UL (ref 150–450)
POTASSIUM SERPL-SCNC: 5.4 MMOL/L (ref 3.5–5.2)
PROT SERPL-MCNC: 7.6 G/DL (ref 6–8.5)
RBC # BLD AUTO: 5.88 X10E6/UL (ref 4.14–5.8)
SODIUM SERPL-SCNC: 142 MMOL/L (ref 134–144)
TRIGL SERPL-MCNC: 206 MG/DL (ref 0–149)
URATE SERPL-MCNC: 8.2 MG/DL (ref 3.7–8.6)
VLDLC SERPL CALC-MCNC: 41 MG/DL (ref 5–40)
WBC # BLD AUTO: 7.6 X10E3/UL (ref 3.4–10.8)

## 2020-09-04 ENCOUNTER — TELEPHONE (OUTPATIENT)
Dept: FAMILY MEDICINE CLINIC | Facility: CLINIC | Age: 45
End: 2020-09-04

## 2020-09-04 RX ORDER — SILDENAFIL 100 MG/1
100 TABLET, FILM COATED ORAL DAILY PRN
Qty: 30 TABLET | Refills: 2 | Status: SHIPPED | OUTPATIENT
Start: 2020-09-04 | End: 2020-09-14

## 2020-09-04 NOTE — TELEPHONE ENCOUNTER
Best price is actually [facility] but I can send it to hometown in [place], their price is pretty good!

## 2020-09-04 NOTE — TELEPHONE ENCOUNTER
----- Message from Norberto Keita sent at 9/4/2020 10:22 AM EDT -----  Regarding: Prescription Question  Contact: [phone]  Balta Cheung    During our recent visit, you had mentioned that you would prescribe me with viagra. I understand that my current pharmacy at [facility] does not fill this type of prescription.     I know you had mentioned that it could be filled at a [facility] and I just realized that we have one location in [place]. Please advise how I can get that prescription filled and sent over to that location.    Thank you.

## 2020-09-14 ENCOUNTER — TELEPHONE (OUTPATIENT)
Dept: FAMILY MEDICINE CLINIC | Facility: CLINIC | Age: 45
End: 2020-09-14

## 2020-09-14 DIAGNOSIS — N52.9 ERECTILE DYSFUNCTION, UNSPECIFIED ERECTILE DYSFUNCTION TYPE: Primary | ICD-10-CM

## 2020-09-14 RX ORDER — TADALAFIL 20 MG/1
TABLET ORAL
Qty: 5 TABLET | Refills: 5 | Status: SHIPPED | OUTPATIENT
Start: 2020-09-14 | End: 2021-03-30 | Stop reason: SDUPTHER

## 2020-09-14 NOTE — TELEPHONE ENCOUNTER
----- Message from Norberto Keita sent at 9/14/2020  2:29 PM EDT -----  Regarding: Prescription Question  Contact: [phone]  I have not had any success with the viagra prescription. I have had cialis before in the past and it seems to work more effective. Is there anyway I can get that to replace viagra prescription ?    Thank you

## 2020-11-12 ENCOUNTER — TELEPHONE (OUTPATIENT)
Dept: FAMILY MEDICINE CLINIC | Facility: CLINIC | Age: 45
End: 2020-11-12

## 2020-11-12 ENCOUNTER — TELEMEDICINE (OUTPATIENT)
Dept: FAMILY MEDICINE CLINIC | Facility: CLINIC | Age: 45
End: 2020-11-12

## 2020-11-12 DIAGNOSIS — M1A.0710 IDIOPATHIC CHRONIC GOUT OF RIGHT FOOT WITHOUT TOPHUS: ICD-10-CM

## 2020-11-12 DIAGNOSIS — M10.9 GOUT OF BIG TOE: ICD-10-CM

## 2020-11-12 DIAGNOSIS — M10.072 ACUTE IDIOPATHIC GOUT OF LEFT ANKLE: Primary | ICD-10-CM

## 2020-11-12 PROCEDURE — 99213 OFFICE O/P EST LOW 20 MIN: CPT | Performed by: NURSE PRACTITIONER

## 2020-11-12 RX ORDER — COLCHICINE 0.6 MG/1
0.6 TABLET ORAL DAILY
Qty: 30 TABLET | Refills: 1 | Status: SHIPPED | OUTPATIENT
Start: 2020-11-12 | End: 2021-07-23 | Stop reason: SDUPTHER

## 2020-11-12 RX ORDER — INDOMETHACIN 50 MG/1
50 CAPSULE ORAL 3 TIMES DAILY PRN
Qty: 30 CAPSULE | Refills: 0 | Status: SHIPPED | OUTPATIENT
Start: 2020-11-12 | End: 2021-01-11

## 2020-11-12 RX ORDER — PREDNISONE 10 MG/1
TABLET ORAL
Qty: 10 TABLET | Refills: 0 | Status: SHIPPED | OUTPATIENT
Start: 2020-11-12 | End: 2021-05-03

## 2020-11-12 NOTE — PROGRESS NOTES
You have chosen to receive care through a telehealth visit.  Do you consent to use a video/audio connection for your medical care today? Yes    HPI:Gout flare  Right foot pain, painful most with walking, swelling. No redness or heat. He has had pain for a few weeks.       ROS: as above    PE: telehealth visit    DX: gout; foot pain    Plan: Prednisone for next 4 days  Indomethacin 50 mg TID prn acute gout flare.  Colcrys 0.6 mg daily as preventive.

## 2020-11-12 NOTE — TELEPHONE ENCOUNTER
PATIENT CALLED AND STATED HE'S BEEN DEALING WITH GOUT IN HIS RIGHT FOOT FOR ABOUT A WEEK.  HE WANTS TO KNOW IF SOMETHING CAN BE CALLED IN.    [facility], [place] - [address] - [phone]  - [phone] FX     PLEASE CONTACT PATIENT TO ADVISE.    CALLBACK:  [phone]

## 2020-11-12 NOTE — TELEPHONE ENCOUNTER
Yes can do a video visit. I think I have 12:30 and 3:45 if you want to get him on the schedule or put him through to up front to schedule. mary anne

## 2020-11-12 NOTE — TELEPHONE ENCOUNTER
Informed pt Dr Cheung is out today and tomorrow and suggested an appt.     Pt asking if it can be over the phone?

## 2021-01-11 DIAGNOSIS — M10.9 GOUT OF BIG TOE: ICD-10-CM

## 2021-01-11 RX ORDER — INDOMETHACIN 50 MG/1
CAPSULE ORAL
Qty: 30 CAPSULE | Refills: 0 | Status: SHIPPED | OUTPATIENT
Start: 2021-01-11 | End: 2021-07-22 | Stop reason: HOSPADM

## 2021-03-30 ENCOUNTER — OFFICE VISIT (OUTPATIENT)
Dept: FAMILY MEDICINE CLINIC | Facility: CLINIC | Age: 46
End: 2021-03-30

## 2021-03-30 VITALS
DIASTOLIC BLOOD PRESSURE: 98 MMHG | HEIGHT: 72 IN | HEART RATE: 80 BPM | BODY MASS INDEX: 42.66 KG/M2 | SYSTOLIC BLOOD PRESSURE: 138 MMHG | TEMPERATURE: 96.9 F | WEIGHT: 315 LBS | RESPIRATION RATE: 18 BRPM

## 2021-03-30 DIAGNOSIS — G47.30 OBSERVED SLEEP APNEA: ICD-10-CM

## 2021-03-30 DIAGNOSIS — R40.0 DAYTIME SOMNOLENCE: ICD-10-CM

## 2021-03-30 DIAGNOSIS — R73.9 HYPERGLYCEMIA: ICD-10-CM

## 2021-03-30 DIAGNOSIS — Z00.00 ANNUAL PHYSICAL EXAM: Primary | ICD-10-CM

## 2021-03-30 DIAGNOSIS — N52.9 ERECTILE DYSFUNCTION, UNSPECIFIED ERECTILE DYSFUNCTION TYPE: ICD-10-CM

## 2021-03-30 DIAGNOSIS — F41.1 GENERALIZED ANXIETY DISORDER: ICD-10-CM

## 2021-03-30 DIAGNOSIS — I10 HYPERTENSION, ESSENTIAL: ICD-10-CM

## 2021-03-30 DIAGNOSIS — R05.9 COUGH: ICD-10-CM

## 2021-03-30 PROCEDURE — 99396 PREV VISIT EST AGE 40-64: CPT | Performed by: FAMILY MEDICINE

## 2021-03-30 RX ORDER — ATENOLOL 100 MG/1
100 TABLET ORAL DAILY
Qty: 90 TABLET | Refills: 1 | Status: SHIPPED | OUTPATIENT
Start: 2021-03-30 | End: 2021-07-22 | Stop reason: HOSPADM

## 2021-03-30 RX ORDER — TADALAFIL 20 MG/1
TABLET ORAL
Qty: 30 TABLET | Refills: 5 | Status: SHIPPED | OUTPATIENT
Start: 2021-03-30 | End: 2022-03-09 | Stop reason: SDUPTHER

## 2021-03-30 RX ORDER — AMLODIPINE BESYLATE 10 MG/1
10 TABLET ORAL DAILY
Qty: 90 TABLET | Refills: 1 | Status: SHIPPED | OUTPATIENT
Start: 2021-03-30 | End: 2021-06-02

## 2021-03-30 RX ORDER — OMEPRAZOLE 40 MG/1
40 CAPSULE, DELAYED RELEASE ORAL DAILY
Qty: 30 CAPSULE | Refills: 3 | Status: SHIPPED | OUTPATIENT
Start: 2021-03-30 | End: 2021-07-22 | Stop reason: HOSPADM

## 2021-03-30 RX ORDER — LISINOPRIL 40 MG/1
40 TABLET ORAL DAILY
Qty: 90 TABLET | Refills: 1 | Status: SHIPPED | OUTPATIENT
Start: 2021-03-30 | End: 2021-05-05 | Stop reason: SINTOL

## 2021-03-30 NOTE — PROGRESS NOTES
"Subjective   Norberto Keita is a 45 y.o. male.     History of Present Illness     Norberto Keita 45 y.o. male who presents for yearly preventive exam.  His overall health is: fair  He exercises none.  But his job is very active.  He does see his dentist regularly  His diet is in general, an \"unhealthy\" diet  He describes his alcohol intake as social drinker  He knows what his cholesterol is No  He is sexually active  He does have ED or other bedroom issues  Does patient smoke No     He is still taking his BP medicine but he thinks the norvasc may be causing him to have ED  BP still high  His weight is up from last time still      The following portions of the patient's history were reviewed and updated as appropriate: allergies, current medications, past family history, past medical history, past social history, past surgical history and problem list.    Review of Systems   Constitutional: Negative.    Respiratory: Negative.    Cardiovascular: Negative.    Psychiatric/Behavioral: Negative.        Objective   Physical Exam  Vitals and nursing note reviewed.   Constitutional:       General: He is not in acute distress.     Appearance: Normal appearance. He is well-developed.   HENT:      Head: Normocephalic and atraumatic.      Right Ear: Tympanic membrane, ear canal and external ear normal.      Left Ear: Tympanic membrane, ear canal and external ear normal.      Nose: Nose normal.   Eyes:      Extraocular Movements: Extraocular movements intact.      Conjunctiva/sclera: Conjunctivae normal.   Neck:      Thyroid: No thyromegaly.   Cardiovascular:      Rate and Rhythm: Normal rate and regular rhythm.      Heart sounds: Normal heart sounds. No murmur heard.     Pulmonary:      Effort: Pulmonary effort is normal. No respiratory distress.      Breath sounds: Normal breath sounds.   Abdominal:      General: Bowel sounds are normal. There is no distension.      Palpations: Abdomen is soft.      Tenderness: There is no " abdominal tenderness.   Musculoskeletal:      Cervical back: Normal range of motion and neck supple.   Lymphadenopathy:      Cervical: No cervical adenopathy.   Skin:     General: Skin is warm and dry.   Neurological:      Mental Status: He is alert and oriented to person, place, and time.   Psychiatric:         Mood and Affect: Mood normal.         Behavior: Behavior normal.         Thought Content: Thought content normal.         Judgment: Judgment normal.         Assessment/Plan   Diagnoses and all orders for this visit:    1. Annual physical exam (Primary)    2. Hypertension, essential  -     amLODIPine (Norvasc) 10 MG tablet; Take 1 tablet by mouth Daily.  Dispense: 90 tablet; Refill: 1  -     atenolol (Tenormin) 100 MG tablet; Take 1 tablet by mouth Daily.  Dispense: 90 tablet; Refill: 1  -     lisinopril (PRINIVIL,ZESTRIL) 40 MG tablet; Take 1 tablet by mouth Daily.  Dispense: 90 tablet; Refill: 1  -     CBC & Differential  -     Comprehensive Metabolic Panel  -     Lipid Panel    3. Generalized anxiety disorder    4. Erectile dysfunction, unspecified erectile dysfunction type  -     tadalafil (Cialis) 20 MG tablet; 1/2-1 PO as needed prior to activity  Dispense: 30 tablet; Refill: 5    5. Observed sleep apnea  -     Home Sleep Study; Future    6. Daytime somnolence  -     Home Sleep Study; Future    7. Hyperglycemia  -     Comprehensive Metabolic Panel  -     Hemoglobin A1c    8. Cough  -     omeprazole (priLOSEC) 40 MG capsule; Take 1 capsule by mouth Daily.  Dispense: 30 capsule; Refill: 3    overall pt doing well, counseled about well adult care including diet and exercise  He feels that one of his BP medicine causes some ED.  Will see which one causes issues and he will call back in 2-4 weeks with update and we will make change at that time  Will use cialis for ED  Will check home sleep study.  We had ordered this on multiple times in the past but he has not had it yet  Will recheck glucose  prilosec for  cough and consider stopping lisinopril if the cough continues

## 2021-04-30 ENCOUNTER — TRANSCRIBE ORDERS (OUTPATIENT)
Dept: LAB | Facility: HOSPITAL | Age: 46
End: 2021-04-30

## 2021-04-30 ENCOUNTER — LAB (OUTPATIENT)
Dept: FAMILY MEDICINE CLINIC | Facility: CLINIC | Age: 46
End: 2021-04-30

## 2021-05-01 LAB
ALBUMIN SERPL-MCNC: 4.5 G/DL (ref 3.5–5.2)
ALBUMIN/GLOB SERPL: 1.6 G/DL
ALP SERPL-CCNC: 97 U/L (ref 39–117)
ALT SERPL-CCNC: 29 U/L (ref 1–41)
AST SERPL-CCNC: 19 U/L (ref 1–40)
BASOPHILS # BLD AUTO: 0.07 10*3/MM3 (ref 0–0.2)
BASOPHILS NFR BLD AUTO: 0.9 % (ref 0–1.5)
BILIRUB SERPL-MCNC: 1 MG/DL (ref 0–1.2)
BUN SERPL-MCNC: 14 MG/DL (ref 6–20)
BUN/CREAT SERPL: 15.9 (ref 7–25)
CALCIUM SERPL-MCNC: 10 MG/DL (ref 8.6–10.5)
CHLORIDE SERPL-SCNC: 98 MMOL/L (ref 98–107)
CHOLEST SERPL-MCNC: 237 MG/DL (ref 0–200)
CO2 SERPL-SCNC: 31 MMOL/L (ref 22–29)
CREAT SERPL-MCNC: 0.88 MG/DL (ref 0.76–1.27)
EOSINOPHIL # BLD AUTO: 0 10*3/MM3 (ref 0–0.4)
EOSINOPHIL NFR BLD AUTO: 0 % (ref 0.3–6.2)
ERYTHROCYTE [DISTWIDTH] IN BLOOD BY AUTOMATED COUNT: 12.5 % (ref 12.3–15.4)
GLOBULIN SER CALC-MCNC: 2.9 GM/DL
GLUCOSE SERPL-MCNC: 92 MG/DL (ref 65–99)
HBA1C MFR BLD: 6.5 % (ref 4.8–5.6)
HCT VFR BLD AUTO: 56 % (ref 37.5–51)
HDLC SERPL-MCNC: 42 MG/DL (ref 40–60)
HGB BLD-MCNC: 18.7 G/DL (ref 13–17.7)
IMM GRANULOCYTES # BLD AUTO: 0.05 10*3/MM3 (ref 0–0.05)
IMM GRANULOCYTES NFR BLD AUTO: 0.7 % (ref 0–0.5)
LDLC SERPL CALC-MCNC: 173 MG/DL (ref 0–100)
LYMPHOCYTES # BLD AUTO: 1.74 10*3/MM3 (ref 0.7–3.1)
LYMPHOCYTES NFR BLD AUTO: 22.7 % (ref 19.6–45.3)
MCH RBC QN AUTO: 28.7 PG (ref 26.6–33)
MCHC RBC AUTO-ENTMCNC: 33.4 G/DL (ref 31.5–35.7)
MCV RBC AUTO: 86 FL (ref 79–97)
MONOCYTES # BLD AUTO: 0.59 10*3/MM3 (ref 0.1–0.9)
MONOCYTES NFR BLD AUTO: 7.7 % (ref 5–12)
NEUTROPHILS # BLD AUTO: 5.2 10*3/MM3 (ref 1.7–7)
NEUTROPHILS NFR BLD AUTO: 68 % (ref 42.7–76)
NRBC BLD AUTO-RTO: 0 /100 WBC (ref 0–0.2)
PLATELET # BLD AUTO: 193 10*3/MM3 (ref 140–450)
POTASSIUM SERPL-SCNC: 4.5 MMOL/L (ref 3.5–5.2)
PROT SERPL-MCNC: 7.4 G/DL (ref 6–8.5)
RBC # BLD AUTO: 6.51 10*6/MM3 (ref 4.14–5.8)
SODIUM SERPL-SCNC: 141 MMOL/L (ref 136–145)
TRIGL SERPL-MCNC: 122 MG/DL (ref 0–150)
VLDLC SERPL CALC-MCNC: 22 MG/DL (ref 5–40)
WBC # BLD AUTO: 7.65 10*3/MM3 (ref 3.4–10.8)

## 2021-05-03 DIAGNOSIS — E78.00 HYPERCHOLESTEROLEMIA: Primary | ICD-10-CM

## 2021-05-03 RX ORDER — ATORVASTATIN CALCIUM 40 MG/1
40 TABLET, FILM COATED ORAL NIGHTLY
Qty: 30 TABLET | Refills: 5 | Status: SHIPPED | OUTPATIENT
Start: 2021-05-03 | End: 2021-07-28 | Stop reason: SDUPTHER

## 2021-05-05 DIAGNOSIS — I10 HYPERTENSION, ESSENTIAL: Primary | ICD-10-CM

## 2021-05-05 RX ORDER — LOSARTAN POTASSIUM 100 MG/1
100 TABLET ORAL DAILY
Qty: 30 TABLET | Refills: 5 | Status: SHIPPED | OUTPATIENT
Start: 2021-05-05 | End: 2021-07-28 | Stop reason: SDUPTHER

## 2021-06-02 ENCOUNTER — OFFICE VISIT (OUTPATIENT)
Dept: FAMILY MEDICINE CLINIC | Facility: CLINIC | Age: 46
End: 2021-06-02

## 2021-06-02 VITALS
WEIGHT: 315 LBS | TEMPERATURE: 98 F | HEIGHT: 72 IN | RESPIRATION RATE: 18 BRPM | DIASTOLIC BLOOD PRESSURE: 92 MMHG | BODY MASS INDEX: 42.66 KG/M2 | SYSTOLIC BLOOD PRESSURE: 146 MMHG | HEART RATE: 76 BPM

## 2021-06-02 DIAGNOSIS — E78.2 MIXED HYPERLIPIDEMIA: ICD-10-CM

## 2021-06-02 DIAGNOSIS — Z51.81 MEDICATION MONITORING ENCOUNTER: ICD-10-CM

## 2021-06-02 DIAGNOSIS — I10 ESSENTIAL HYPERTENSION: ICD-10-CM

## 2021-06-02 DIAGNOSIS — E66.01 MORBID (SEVERE) OBESITY DUE TO EXCESS CALORIES (HCC): ICD-10-CM

## 2021-06-02 DIAGNOSIS — G47.33 OSA (OBSTRUCTIVE SLEEP APNEA): ICD-10-CM

## 2021-06-02 DIAGNOSIS — E11.9 TYPE 2 DIABETES MELLITUS WITHOUT COMPLICATION, WITHOUT LONG-TERM CURRENT USE OF INSULIN (HCC): Primary | ICD-10-CM

## 2021-06-02 DIAGNOSIS — R60.0 EDEMA OF BOTH LOWER EXTREMITIES: ICD-10-CM

## 2021-06-02 LAB — HBA1C MFR BLD: 6.1 %

## 2021-06-02 PROCEDURE — 99214 OFFICE O/P EST MOD 30 MIN: CPT | Performed by: PHYSICIAN ASSISTANT

## 2021-06-02 PROCEDURE — 83036 HEMOGLOBIN GLYCOSYLATED A1C: CPT | Performed by: PHYSICIAN ASSISTANT

## 2021-06-02 RX ORDER — HYDROCHLOROTHIAZIDE 25 MG/1
25 TABLET ORAL DAILY
Qty: 30 TABLET | Refills: 0 | Status: SHIPPED | OUTPATIENT
Start: 2021-06-02 | End: 2021-07-22 | Stop reason: HOSPADM

## 2021-06-02 RX ORDER — AMLODIPINE BESYLATE 5 MG/1
5 TABLET ORAL DAILY
Qty: 30 TABLET | Refills: 0 | Status: SHIPPED | OUTPATIENT
Start: 2021-06-02 | End: 2021-07-22 | Stop reason: HOSPADM

## 2021-06-02 NOTE — PROGRESS NOTES
Subjective   Norberto Keita is a 45 y.o. male.     History of Present Illness   Recently diagnosed with DM type 2. A1c right at 6.5%. pt wanting to try nutrition/ lifestyle changes first prior to starting metformin. POC glucose in office 6.1%. plans on trying to lose weight. Current BMI 49.5.   Pt requests referral to cardiology. Has strong family hx of CAD. Has been having leg swelling and pigment changes of lower extremity for the last year, but swelling notable worse over the last several days. L leg swelling worse than R.   No hx of DVT   Swelling can be uncomfortable at times. Has not tried compression socks yet.   Possible medication s/e with amlodipine pt thinks.   Does note sometimes has SOB with activity. Echo around 2018 that was reportedly normal. Was having chest pain and a lot of stress around that time. BP was notably high during that time as well.   Has been diagnosed with sleep apnea. Has not been on CPAP therapy in over 9 years. Thinks he had a sleep study a couple years ago in [place], but not having great communication with this office. Requesting referral to new sleep medicine office so he can get his much needed CPAP device and resume therapy. Tired all the time. Napping a lot. Falls asleep easily. Partner notes that his apneic periods at night are very alarming       The following portions of the patient's history were reviewed and updated as appropriate: allergies, current medications, past family history, past medical history, past social history, past surgical history and problem list.    Review of Systems   Constitutional: Positive for fatigue. Negative for chills, diaphoresis and fever.   HENT: Negative.  Negative for congestion, ear discharge, ear pain, hearing loss, nosebleeds, postnasal drip, sinus pressure, sneezing and sore throat.    Eyes: Negative.    Respiratory: Positive for shortness of breath. Negative for cough, chest tightness and wheezing.    Cardiovascular: Positive  "for leg swelling. Negative for chest pain and palpitations.   Gastrointestinal: Negative for abdominal distention, abdominal pain, blood in stool, constipation, diarrhea, nausea and vomiting.   Genitourinary: Negative.  Negative for difficulty urinating, dysuria, flank pain, frequency, hematuria and urgency.   Skin: Positive for color change. Negative for pallor, rash and wound.   Neurological: Negative for dizziness, syncope, weakness, light-headedness, numbness and headaches.   Psychiatric/Behavioral: Positive for sleep disturbance.       Objective    Blood pressure 146/92, pulse 76, temperature 98 °F (36.7 °C), resp. rate 18, height 182.9 cm (72\"), weight (!) 166 kg (365 lb).   Body mass index is 49.5 kg/m².     Physical Exam  Vitals and nursing note reviewed.   Constitutional:       Appearance: Normal appearance. He is well-developed. He is obese.   HENT:      Head: Normocephalic and atraumatic.      Right Ear: External ear normal.      Left Ear: External ear normal.      Nose: Nose normal.   Eyes:      Conjunctiva/sclera: Conjunctivae normal.   Neck:      Thyroid: No thyromegaly.      Trachea: No tracheal deviation.   Cardiovascular:      Rate and Rhythm: Normal rate and regular rhythm.      Heart sounds: Normal heart sounds.      Comments: Reddish pigmentation of LE bilaterally   Pulmonary:      Effort: Pulmonary effort is normal. No respiratory distress.      Breath sounds: Normal breath sounds. No wheezing or rales.   Chest:      Chest wall: No tenderness.   Musculoskeletal:         General: No tenderness or deformity. Normal range of motion.      Cervical back: Normal range of motion and neck supple.      Right lower le+ Edema present.      Left lower le+ Edema present.   Lymphadenopathy:      Cervical: No cervical adenopathy.   Skin:     General: Skin is warm and dry.   Neurological:      Mental Status: He is alert and oriented to person, place, and time.   Psychiatric:         Mood and Affect: " Mood normal.         Behavior: Behavior normal.         Thought Content: Thought content normal.         Judgment: Judgment normal.         Assessment/Plan   Diagnoses and all orders for this visit:    1. Type 2 diabetes mellitus without complication, without long-term current use of insulin (CMS/[facility]) (Primary)  -     POC Glycosylated Hemoglobin (Hb A1C)  -     CBC w AUTO Differential  -     Comprehensive metabolic panel    2. LEILANI (obstructive sleep apnea)  -     Ambulatory Referral to Sleep Medicine    3. Essential hypertension  -     Ambulatory Referral to Cardiology  -     Discontinue: amLODIPine (NORVASC) 5 MG tablet; Take 1 tablet by mouth Daily.  Dispense: 30 tablet; Refill: 0  -     Discontinue: hydroCHLOROthiazide (HYDRODIURIL) 25 MG tablet; Take 1 tablet by mouth Daily.  Dispense: 30 tablet; Refill: 0    4. Edema of both lower extremities  -     Ambulatory Referral to Cardiology  -     D-dimer, Quantitative  -     Discontinue: hydroCHLOROthiazide (HYDRODIURIL) 25 MG tablet; Take 1 tablet by mouth Daily.  Dispense: 30 tablet; Refill: 0    5. Mixed hyperlipidemia  -     Lipid Panel    6. Medication monitoring encounter  -     Magnesium    7. Morbid (severe) obesity due to excess calories ([facility])      New sleep medicine referral placed as well as cardiology referral placed  Check labs as outlined in plan   Will cut back amlodipine to 5 mg daily and add HCTZ to help with HTN as well as LE edema. Compression socks and leg elevation as directed   Check BP regularly and contact office with readings   POC A1c in office reassuring that his changes are helping with blood sugar control. Gave resources on intermittent fasting which has been found beneficial to type 2 diabetics and aiding in weight loss.   Continue routine follow ups with PCP for management

## 2021-06-03 LAB
ALBUMIN SERPL-MCNC: 4.4 G/DL (ref 3.5–5.2)
ALBUMIN/GLOB SERPL: 1.7 G/DL
ALP SERPL-CCNC: 116 U/L (ref 39–117)
ALT SERPL-CCNC: 30 U/L (ref 1–41)
AST SERPL-CCNC: 17 U/L (ref 1–40)
BASOPHILS # BLD AUTO: 0.07 10*3/MM3 (ref 0–0.2)
BASOPHILS NFR BLD AUTO: 0.9 % (ref 0–1.5)
BILIRUB SERPL-MCNC: 1.7 MG/DL (ref 0–1.2)
BUN SERPL-MCNC: 13 MG/DL (ref 6–20)
BUN/CREAT SERPL: 16 (ref 7–25)
CALCIUM SERPL-MCNC: 9.7 MG/DL (ref 8.6–10.5)
CHLORIDE SERPL-SCNC: 100 MMOL/L (ref 98–107)
CHOLEST SERPL-MCNC: 130 MG/DL (ref 0–200)
CO2 SERPL-SCNC: 30.8 MMOL/L (ref 22–29)
CREAT SERPL-MCNC: 0.81 MG/DL (ref 0.76–1.27)
D DIMER PPP FEU-MCNC: 0.23 MCGFEU/ML (ref 0–0.56)
EOSINOPHIL # BLD AUTO: 0.06 10*3/MM3 (ref 0–0.4)
EOSINOPHIL NFR BLD AUTO: 0.8 % (ref 0.3–6.2)
ERYTHROCYTE [DISTWIDTH] IN BLOOD BY AUTOMATED COUNT: 13.8 % (ref 12.3–15.4)
GLOBULIN SER CALC-MCNC: 2.6 GM/DL
GLUCOSE SERPL-MCNC: 102 MG/DL (ref 65–99)
HCT VFR BLD AUTO: 57.6 % (ref 37.5–51)
HDLC SERPL-MCNC: 36 MG/DL (ref 40–60)
HGB BLD-MCNC: 18.3 G/DL (ref 13–17.7)
IMM GRANULOCYTES # BLD AUTO: 0.07 10*3/MM3 (ref 0–0.05)
IMM GRANULOCYTES NFR BLD AUTO: 0.9 % (ref 0–0.5)
LDLC SERPL CALC-MCNC: 73 MG/DL (ref 0–100)
LYMPHOCYTES # BLD AUTO: 1.63 10*3/MM3 (ref 0.7–3.1)
LYMPHOCYTES NFR BLD AUTO: 21.7 % (ref 19.6–45.3)
MAGNESIUM SERPL-MCNC: 2.1 MG/DL (ref 1.6–2.6)
MCH RBC QN AUTO: 27.4 PG (ref 26.6–33)
MCHC RBC AUTO-ENTMCNC: 31.8 G/DL (ref 31.5–35.7)
MCV RBC AUTO: 86.2 FL (ref 79–97)
MONOCYTES # BLD AUTO: 0.6 10*3/MM3 (ref 0.1–0.9)
MONOCYTES NFR BLD AUTO: 8 % (ref 5–12)
NEUTROPHILS # BLD AUTO: 5.09 10*3/MM3 (ref 1.7–7)
NEUTROPHILS NFR BLD AUTO: 67.7 % (ref 42.7–76)
NRBC BLD AUTO-RTO: 0.1 /100 WBC (ref 0–0.2)
PLATELET # BLD AUTO: 172 10*3/MM3 (ref 140–450)
POTASSIUM SERPL-SCNC: 4.8 MMOL/L (ref 3.5–5.2)
PROT SERPL-MCNC: 7 G/DL (ref 6–8.5)
RBC # BLD AUTO: 6.68 10*6/MM3 (ref 4.14–5.8)
SODIUM SERPL-SCNC: 139 MMOL/L (ref 136–145)
TRIGL SERPL-MCNC: 114 MG/DL (ref 0–150)
VLDLC SERPL CALC-MCNC: 21 MG/DL (ref 5–40)
WBC # BLD AUTO: 7.52 10*3/MM3 (ref 3.4–10.8)

## 2021-06-14 DIAGNOSIS — R71.8 ELEVATED RED BLOOD CELL COUNT: Primary | ICD-10-CM

## 2021-06-30 ENCOUNTER — OFFICE VISIT (OUTPATIENT)
Dept: CARDIOLOGY | Facility: CLINIC | Age: 46
End: 2021-06-30

## 2021-06-30 VITALS
HEART RATE: 78 BPM | WEIGHT: 315 LBS | DIASTOLIC BLOOD PRESSURE: 88 MMHG | OXYGEN SATURATION: 98 % | BODY MASS INDEX: 40.43 KG/M2 | HEIGHT: 74 IN | SYSTOLIC BLOOD PRESSURE: 128 MMHG

## 2021-06-30 DIAGNOSIS — R06.02 SOB (SHORTNESS OF BREATH): Primary | ICD-10-CM

## 2021-06-30 PROCEDURE — 93000 ELECTROCARDIOGRAM COMPLETE: CPT | Performed by: PHYSICIAN ASSISTANT

## 2021-06-30 PROCEDURE — 99243 OFF/OP CNSLTJ NEW/EST LOW 30: CPT | Performed by: PHYSICIAN ASSISTANT

## 2021-06-30 NOTE — PROGRESS NOTES
[facility]  INITIAL OFFICE CONSULT      Norberto Keita  1975  PCP: Cam Cheung MD    SUBJECTIVE:   Norberto Keita is a 45 y.o. 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  The patient reports that his mother was diagnosed with hypertrophic obstructive cardiomyopathy in her late 40s received ICD she  at age 58 of unknown causes.  She did have a history of multiple medical problems.  He denies any other family members have had diagnosis of hypertrophic cardiomyopathy.  However, his father did have bypass surgery in his 50s.  Because of his family history and his medical condition he wanted to have a further cardiac evaluation.  He states he has some shortness with exertion but he attributes some of this to his weight gain he has had his peak weight of 360 pounds.  He does not have any chest pain or chest tightness suggesting angina pectoris.  He denies any palpitations dizziness near syncope syncope.  He has been working with family physician for blood pressure control and he reports this is doing well at this time.  He is also on statin for dyslipidemia.  Visit today for cardiac valuation.      Cardiac PMH: (Old records have been reviewed and summarized below)  1. Family History HOCM  2. HTN  3. HLD  4. GERD  5. ED  6. Obesity  7. Untreated Sleep apnea      Past Medical History, Past Surgical History, Family history, Social History, and Medications were all reviewed with the patient today  and updated as necessary.     Current Outpatient Medications   Medication Sig Dispense Refill   • amLODIPine (NORVASC) 5 MG tablet Take 1 tablet by mouth Daily. 30 tablet 0   • atenolol (Tenormin) 100 MG tablet Take 1 tablet by mouth Daily. 90 tablet 1   • atorvastatin (Lipitor) 40 MG tablet Take 1 tablet by mouth Every Night. 30 tablet 5   • colchicine 0.6 MG tablet Take 1 tablet by mouth Daily. (Patient taking differently: Take 0.6 mg by mouth As Needed.) 30 tablet 1   • hydroCHLOROthiazide (HYDRODIURIL) 25 MG tablet Take 1 tablet by mouth Daily. 30 tablet 0   • indomethacin (INDOCIN) 50 MG capsule TAKE 1 CAPSULE BY MOUTH 3 TIMES A DAY AS NEEDED (ACUTE GOUT ATTACK) 30 capsule 0   • losartan (Cozaar) 100 MG tablet Take 1 tablet by mouth Daily. 30 tablet 5   • omeprazole (priLOSEC) 40 MG capsule Take 1 capsule by mouth Daily. 30 capsule 3   • tadalafil (Cialis) 20 MG tablet 1/2-1 PO as needed prior to activity 30 tablet 5     No current facility-administered medications for this visit.     Allergies   Allergen Reactions   • Lisinopril Cough         Past Medical History:   Diagnosis Date   • Decreased libido     low libido can be multifactorial, will check labs and follow up. did discuss with pt various treatment options    • Hypertension    • Sacroiliitis (CMS/HCC)      Past Surgical History:   Procedure Laterality Date   • HYDROCELE EXCISION / REPAIR       Surgery [facility] Vaginal[facility] Excision of Hydrocele   • KNEE SURGERY Left    • VASECTOMY       Family History   Problem Relation Age of Onset   • Diabetes Mother    • Hyperlipidemia Mother    • Hypertension Mother    • Coronary artery disease Father    • Hyperlipidemia Father    • Hypertension Father    • Heart disease Father    • Heart failure Father    • Deep vein thrombosis Father      Social History     Tobacco Use   • Smoking status: Never Smoker   • Smokeless tobacco: Never Used   Substance Use Topics   • Alcohol use: Yes     Comment: 3-4 times weekly  "      ROS:  Review of Symptoms:  General: no recent weight loss/gain, weakness or fatigue  Skin: no rashes, lumps, or other skin changes  HEENT: no dizziness, lightheadedness, or vision changes  Respiratory: no cough or hemoptysis  Cardiovascular: no palpitations, and tachycardia  Gastrointestinal: no black/tarry stools or diarrhea  Urinary: no change in frequency or urgency  Peripheral Vascular: no claudication or leg cramps  Musculoskeletal: no muscle or joint pain/stiffness  Psychiatric: no depression or excessive stress  Neurological: no sensory or motor loss, no syncope  Hematologic: no anemia, easy bruising or bleeding  Endocrine: no thyroid problems, nor heat or cold intolerance         PHYSICAL EXAM:   /88 (BP Location: Right arm, Patient Position: Sitting)   Pulse 78   Ht 188 cm (74\")   Wt (!) 163 kg (360 lb 6.4 oz)   SpO2 98%   BMI 46.27 kg/m²      Wt Readings from Last 5 Encounters:   06/30/21 (!) 163 kg (360 lb 6.4 oz)   06/02/21 (!) 166 kg (365 lb)   03/30/21 (!) 164 kg (361 lb)   08/24/20 (!) 156 kg (345 lb)   11/22/19 (!) 154 kg (339 lb 6.4 oz)     BP Readings from Last 5 Encounters:   06/30/21 128/88   06/02/21 146/92   03/30/21 138/98   08/24/20 (!) 130/110   11/22/19 124/84       General-Well Nourished, Well developed  Eyes - PERRLA  Neck- supple, No mass  CV- regular rate and rhythm, no MRG  Lung- clear bilaterally  Abd- soft, +BS  Musc/skel - Norm strength and range of motion  Skin- warm and dry  Neuro - Alert & Oriented x 3, appropriate mood.    Patient's external notes were reviewed.  Independent interpretation of test performed by another physician in facility were reviewed.  Outside laboratory data was also reviewed.    Medical problems and test results were reviewed with the patient today.     Results for orders placed or performed in visit on 06/02/21   Comprehensive metabolic panel    Specimen: Blood   Result Value Ref Range    Glucose 102 (H) 65 - 99 mg/dL    BUN 13 6 - 20 " mg/dL    Creatinine 0.81 0.76 - 1.27 mg/dL    eGFR Non African Am 104 >60 mL/min/1.73    eGFR African Am 125 >60 mL/min/1.73    BUN/Creatinine Ratio 16.0 7.0 - 25.0    Sodium 139 136 - 145 mmol/L    Potassium 4.8 3.5 - 5.2 mmol/L    Chloride 100 98 - 107 mmol/L    Total CO2 30.8 (H) 22.0 - 29.0 mmol/L    Calcium 9.7 8.6 - 10.5 mg/dL    Total Protein 7.0 6.0 - 8.5 g/dL    Albumin 4.40 3.50 - 5.20 g/dL    Globulin 2.6 gm/dL    A/G Ratio 1.7 g/dL    Total Bilirubin 1.7 (H) 0.0 - 1.2 mg/dL    Alkaline Phosphatase 116 39 - 117 U/L    AST (SGOT) 17 1 - 40 U/L    ALT (SGPT) 30 1 - 41 U/L   D-dimer, Quantitative    Specimen: Blood   Result Value Ref Range    D-Dimer, Quant 0.23 0.00 - 0.56 MCGFEU/mL   Lipid Panel    Specimen: Blood   Result Value Ref Range    Total Cholesterol 130 0 - 200 mg/dL    Triglycerides 114 0 - 150 mg/dL    HDL Cholesterol 36 (L) 40 - 60 mg/dL    VLDL Cholesterol Bi 21 5 - 40 mg/dL    LDL Chol Calc (NIH) 73 0 - 100 mg/dL   Magnesium    Specimen: Blood   Result Value Ref Range    Magnesium 2.1 1.6 - 2.6 mg/dL   POC Glycosylated Hemoglobin (Hb A1C)    Specimen: Blood   Result Value Ref Range    Hemoglobin A1C 6.1 %   CBC w AUTO Differential    Specimen: Blood   Result Value Ref Range    WBC 7.52 3.40 - 10.80 10*3/mm3    RBC 6.68 (H) 4.14 - 5.80 10*6/mm3    Hemoglobin 18.3 (H) 13.0 - 17.7 g/dL    Hematocrit 57.6 (H) 37.5 - 51.0 %    MCV 86.2 79.0 - 97.0 fL    MCH 27.4 26.6 - 33.0 pg    MCHC 31.8 31.5 - 35.7 g/dL    RDW 13.8 12.3 - 15.4 %    Platelets 172 140 - 450 10*3/mm3    Neutrophil Rel % 67.7 42.7 - 76.0 %    Lymphocyte Rel % 21.7 19.6 - 45.3 %    Monocyte Rel % 8.0 5.0 - 12.0 %    Eosinophil Rel % 0.8 0.3 - 6.2 %    Basophil Rel % 0.9 0.0 - 1.5 %    Neutrophils Absolute 5.09 1.70 - 7.00 10*3/mm3    Lymphocytes Absolute 1.63 0.70 - 3.10 10*3/mm3    Monocytes Absolute 0.60 0.10 - 0.90 10*3/mm3    Eosinophils Absolute 0.06 0.00 - 0.40 10*3/mm3    Basophils Absolute 0.07 0.00 - 0.20 10*3/mm3     Immature Granulocyte Rel % 0.9 (H) 0.0 - 0.5 %    Immature Grans Absolute 0.07 (H) 0.00 - 0.05 10*3/mm3    nRBC 0.1 0.0 - 0.2 /100 WBC         Lab Results   Component Value Date    HDL 36 (L) 06/02/2021    LDL 73 06/02/2021    VLDL 21 06/02/2021       EKG:  (EKG/Tracing has been independently visualized by me and summarized below)      ECG 12 Lead    Date/Time: 6/30/2021 10:19 AM  Performed by: Neymar Bhakta PA  Authorized by: Neymar Bhakta PA   Comparison: not compared with previous ECG   Rhythm: sinus rhythm  Rate: normal  Conduction: conduction normal  ST Segments: ST segments normal  T Waves: T waves normal  QRS axis: normal    Clinical impression: normal ECG            ASSESSMENT   1. SOB, Family History of HOCM  2. HTN: Improved control on BB, HCTZ, Cozaar.   3. HLD: Lipitor  4. Obesity, BMI 360lbs  5. Sleep Apnea-Plan for treatment      PLAN  · Echocardiogram  · Continue to focus on risk factors-BP control, HLD treatment, diet and exercise and treatment for CPAP.  · Return for follow-up in 2 to 3 months or sooner if any change in symptoms.          Cardiology/Electrophysiology  06/30/21  08:48 EDT  Will Yony ESPAÑA

## 2021-07-12 ENCOUNTER — HOSPITAL ENCOUNTER (INPATIENT)
Facility: HOSPITAL | Age: 46
LOS: 10 days | Discharge: HOME | End: 2021-07-22
Attending: EMERGENCY MEDICINE | Admitting: FAMILY MEDICINE

## 2021-07-12 ENCOUNTER — APPOINTMENT (OUTPATIENT)
Dept: CT IMAGING | Facility: HOSPITAL | Age: 46
End: 2021-07-12

## 2021-07-12 ENCOUNTER — APPOINTMENT (OUTPATIENT)
Dept: GENERAL RADIOLOGY | Facility: HOSPITAL | Age: 46
End: 2021-07-12

## 2021-07-12 ENCOUNTER — APPOINTMENT (OUTPATIENT)
Dept: CARDIOLOGY | Facility: HOSPITAL | Age: 46
End: 2021-07-12

## 2021-07-12 DIAGNOSIS — J96.01 ACUTE RESPIRATORY FAILURE WITH HYPOXIA AND HYPERCARBIA (HCC): Primary | ICD-10-CM

## 2021-07-12 DIAGNOSIS — J96.02 ACUTE RESPIRATORY FAILURE WITH HYPOXIA AND HYPERCARBIA (HCC): Primary | ICD-10-CM

## 2021-07-12 PROBLEM — E66.2 OBESITY HYPOVENTILATION SYNDROME: Status: ACTIVE | Noted: 2021-07-12

## 2021-07-12 PROBLEM — J81.0 ACUTE PULMONARY EDEMA (HCC): Status: ACTIVE | Noted: 2021-07-12

## 2021-07-12 PROBLEM — G47.33 OBSTRUCTIVE SLEEP APNEA: Status: ACTIVE | Noted: 2021-07-12

## 2021-07-12 PROBLEM — J96.22 ACUTE ON CHRONIC RESPIRATORY FAILURE WITH HYPOXIA AND HYPERCAPNIA: Status: ACTIVE | Noted: 2021-07-12

## 2021-07-12 PROBLEM — J96.21 ACUTE ON CHRONIC RESPIRATORY FAILURE WITH HYPOXIA AND HYPERCAPNIA (HCC): Status: ACTIVE | Noted: 2021-07-12

## 2021-07-12 PROBLEM — I16.1 HYPERTENSIVE EMERGENCY: Status: ACTIVE | Noted: 2021-07-12

## 2021-07-12 LAB
ALBUMIN SERPL-MCNC: 4.1 G/DL (ref 3.5–5.2)
ALBUMIN/GLOB SERPL: 1.3 G/DL
ALP SERPL-CCNC: 131 U/L (ref 39–117)
ALT SERPL W P-5'-P-CCNC: 73 U/L (ref 1–41)
ANION GAP SERPL CALCULATED.3IONS-SCNC: 6 MMOL/L (ref 5–15)
ARTERIAL PATENCY WRIST A: ABNORMAL
ARTERIAL PATENCY WRIST A: ABNORMAL
ARTERIAL PATENCY WRIST A: POSITIVE
AST SERPL-CCNC: 34 U/L (ref 1–40)
ATMOSPHERIC PRESS: ABNORMAL MM[HG]
BASE EXCESS BLDA CALC-SCNC: 5.9 MMOL/L (ref 0–2)
BASE EXCESS BLDA CALC-SCNC: 6.6 MMOL/L (ref 0–2)
BASE EXCESS BLDA CALC-SCNC: 8.1 MMOL/L (ref 0–2)
BASOPHILS # BLD AUTO: 0.08 10*3/MM3 (ref 0–0.2)
BASOPHILS NFR BLD AUTO: 0.7 % (ref 0–1.5)
BDY SITE: ABNORMAL
BH CV ECHO MEAS - AO MAX PG (FULL): 0.85 MMHG
BH CV ECHO MEAS - AO MAX PG: 8 MMHG
BH CV ECHO MEAS - AO MEAN PG (FULL): 1.2 MMHG
BH CV ECHO MEAS - AO MEAN PG: 4.7 MMHG
BH CV ECHO MEAS - AO ROOT AREA (BSA CORRECTED): 1.5
BH CV ECHO MEAS - AO ROOT AREA: 13.2 CM^2
BH CV ECHO MEAS - AO ROOT DIAM: 3.8 CM
BH CV ECHO MEAS - AO V2 MAX: 143.3 CM/SEC
BH CV ECHO MEAS - AO V2 MEAN: 98.8 CM/SEC
BH CV ECHO MEAS - AO V2 VTI: 33.3 CM
BH CV ECHO MEAS - ASC AORTA: 4 CM
BH CV ECHO MEAS - AVA(I,A): 4.1 CM^2
BH CV ECHO MEAS - AVA(I,D): 4.1 CM^2
BH CV ECHO MEAS - AVA(V,A): 4.2 CM^2
BH CV ECHO MEAS - AVA(V,D): 4.2 CM^2
BH CV ECHO MEAS - BSA(HAYCOCK): 3 M^2
BH CV ECHO MEAS - BSA: 2.8 M^2
BH CV ECHO MEAS - BZI_BMI: 45.6 KILOGRAMS/M^2
BH CV ECHO MEAS - BZI_METRIC_HEIGHT: 188 CM
BH CV ECHO MEAS - BZI_METRIC_WEIGHT: 161 KG
BH CV ECHO MEAS - EDV(CUBED): 148.9 ML
BH CV ECHO MEAS - EDV(MOD-SP2): 113 ML
BH CV ECHO MEAS - EDV(MOD-SP4): 165 ML
BH CV ECHO MEAS - EDV(TEICH): 135.3 ML
BH CV ECHO MEAS - EF(CUBED): 25.7 %
BH CV ECHO MEAS - EF(MOD-BP): 54.1 %
BH CV ECHO MEAS - EF(MOD-SP2): 59.4 %
BH CV ECHO MEAS - EF(MOD-SP4): 45.2 %
BH CV ECHO MEAS - EF(TEICH): 20.6 %
BH CV ECHO MEAS - ESV(CUBED): 110.6 ML
BH CV ECHO MEAS - ESV(MOD-SP2): 45.9 ML
BH CV ECHO MEAS - ESV(MOD-SP4): 90.5 ML
BH CV ECHO MEAS - ESV(TEICH): 107.5 ML
BH CV ECHO MEAS - FS: 9.4 %
BH CV ECHO MEAS - IVS/LVPW: 1.1
BH CV ECHO MEAS - IVSD: 2 CM
BH CV ECHO MEAS - LA DIMENSION: 5 CM
BH CV ECHO MEAS - LA/AO: 1.2
BH CV ECHO MEAS - LAD MAJOR: 6.8 CM
BH CV ECHO MEAS - LAT PEAK E' VEL: 6.7 CM/SEC
BH CV ECHO MEAS - LATERAL E/E' RATIO: 10.8
BH CV ECHO MEAS - LV DIASTOLIC VOL/BSA (35-75): 59.5 ML/M^2
BH CV ECHO MEAS - LV MASS(C)D: 503.7 GRAMS
BH CV ECHO MEAS - LV MASS(C)DI: 181.6 GRAMS/M^2
BH CV ECHO MEAS - LV MAX PG: 7.1 MMHG
BH CV ECHO MEAS - LV MEAN PG: 3.5 MMHG
BH CV ECHO MEAS - LV SYSTOLIC VOL/BSA (12-30): 32.6 ML/M^2
BH CV ECHO MEAS - LV V1 MAX: 132 CM/SEC
BH CV ECHO MEAS - LV V1 MEAN: 88.2 CM/SEC
BH CV ECHO MEAS - LV V1 VTI: 29.9 CM
BH CV ECHO MEAS - LVIDD: 5.3 CM
BH CV ECHO MEAS - LVIDS: 4.8 CM
BH CV ECHO MEAS - LVLD AP2: 8.5 CM
BH CV ECHO MEAS - LVLD AP4: 9.3 CM
BH CV ECHO MEAS - LVLS AP2: 7.2 CM
BH CV ECHO MEAS - LVLS AP4: 7.7 CM
BH CV ECHO MEAS - LVOT AREA (M): 4.5 CM^2
BH CV ECHO MEAS - LVOT AREA: 4.5 CM^2
BH CV ECHO MEAS - LVOT DIAM: 2.4 CM
BH CV ECHO MEAS - LVPWD: 1.8 CM
BH CV ECHO MEAS - MED PEAK E' VEL: 5.9 CM/SEC
BH CV ECHO MEAS - MEDIAL E/E' RATIO: 12.4
BH CV ECHO MEAS - MV A MAX VEL: 63.2 CM/SEC
BH CV ECHO MEAS - MV DEC SLOPE: 388.5 CM/SEC^2
BH CV ECHO MEAS - MV DEC TIME: 0.23 SEC
BH CV ECHO MEAS - MV E MAX VEL: 72.7 CM/SEC
BH CV ECHO MEAS - MV E/A: 1.2
BH CV ECHO MEAS - MV P1/2T MAX VEL: 91.6 CM/SEC
BH CV ECHO MEAS - MV P1/2T: 69.1 MSEC
BH CV ECHO MEAS - MVA P1/2T LCG: 2.4 CM^2
BH CV ECHO MEAS - MVA(P1/2T): 3.2 CM^2
BH CV ECHO MEAS - PA ACC TIME: 0.14 SEC
BH CV ECHO MEAS - PA MAX PG: 5.5 MMHG
BH CV ECHO MEAS - PA PR(ACCEL): 17.4 MMHG
BH CV ECHO MEAS - PA V2 MAX: 117 CM/SEC
BH CV ECHO MEAS - PI END-D VEL: 136 CM/SEC
BH CV ECHO MEAS - RAP SYSTOLE: 3 MMHG
BH CV ECHO MEAS - RVSP: 24 MMHG
BH CV ECHO MEAS - SI(AO): 158.5 ML/M^2
BH CV ECHO MEAS - SI(CUBED): 13.8 ML/M^2
BH CV ECHO MEAS - SI(LVOT): 48.8 ML/M^2
BH CV ECHO MEAS - SI(MOD-SP2): 24.2 ML/M^2
BH CV ECHO MEAS - SI(MOD-SP4): 26.9 ML/M^2
BH CV ECHO MEAS - SI(TEICH): 10 ML/M^2
BH CV ECHO MEAS - SV(AO): 439.6 ML
BH CV ECHO MEAS - SV(CUBED): 38.3 ML
BH CV ECHO MEAS - SV(LVOT): 135.3 ML
BH CV ECHO MEAS - SV(MOD-SP2): 67.1 ML
BH CV ECHO MEAS - SV(MOD-SP4): 74.5 ML
BH CV ECHO MEAS - SV(TEICH): 27.8 ML
BH CV ECHO MEAS - TAPSE (>1.6): 1.97 CM
BH CV ECHO MEAS - TR MAX PG: 21 MMHG
BH CV ECHO MEAS - TR MAX VEL: 225.3 CM/SEC
BH CV ECHO MEASUREMENTS AVERAGE E/E' RATIO: 11.54
BH CV XLRA - RV BASE: 4.3 CM
BH CV XLRA - RV LENGTH: 7.3 CM
BH CV XLRA - RV MID: 3.8 CM
BH CV XLRA - TDI S': 11.9 CM/SEC
BILIRUB SERPL-MCNC: 1.5 MG/DL (ref 0–1.2)
BODY TEMPERATURE: 37 C
BUN SERPL-MCNC: 10 MG/DL (ref 6–20)
BUN/CREAT SERPL: 13.9 (ref 7–25)
CALCIUM SPEC-SCNC: 9 MG/DL (ref 8.6–10.5)
CHLORIDE SERPL-SCNC: 97 MMOL/L (ref 98–107)
CO2 BLDA-SCNC: 38 MMOL/L (ref 22–33)
CO2 BLDA-SCNC: 39 MMOL/L (ref 22–33)
CO2 BLDA-SCNC: 39.7 MMOL/L (ref 22–33)
CO2 SERPL-SCNC: 33 MMOL/L (ref 22–29)
COHGB MFR BLD: 1.5 % (ref 0–2)
COHGB MFR BLD: 1.7 % (ref 0–2)
COHGB MFR BLD: 1.9 % (ref 0–2)
CREAT SERPL-MCNC: 0.72 MG/DL (ref 0.76–1.27)
D-LACTATE SERPL-SCNC: 1.1 MMOL/L (ref 0.5–2)
DEPRECATED RDW RBC AUTO: 48.2 FL (ref 37–54)
EOSINOPHIL # BLD AUTO: 0 10*3/MM3 (ref 0–0.4)
EOSINOPHIL NFR BLD AUTO: 0 % (ref 0.3–6.2)
EPAP: 0
EPAP: 7
ERYTHROCYTE [DISTWIDTH] IN BLOOD BY AUTOMATED COUNT: 16 % (ref 12.3–15.4)
GFR SERPL CREATININE-BSD FRML MDRD: 119 ML/MIN/1.73
GLOBULIN UR ELPH-MCNC: 3.2 GM/DL
GLUCOSE SERPL-MCNC: 106 MG/DL (ref 65–99)
HCO3 BLDA-SCNC: 35.9 MMOL/L (ref 20–26)
HCO3 BLDA-SCNC: 36.6 MMOL/L (ref 20–26)
HCO3 BLDA-SCNC: 37.5 MMOL/L (ref 20–26)
HCT VFR BLD AUTO: 56.9 % (ref 37.5–51)
HCT VFR BLD CALC: 52.8 %
HCT VFR BLD CALC: 54.4 %
HCT VFR BLD CALC: 54.4 %
HGB BLD-MCNC: 16.8 G/DL (ref 13–17.7)
HGB BLDA-MCNC: 17.2 G/DL (ref 13.5–17.5)
HGB BLDA-MCNC: 17.7 G/DL (ref 13.5–17.5)
HGB BLDA-MCNC: 17.8 G/DL (ref 13.5–17.5)
HOLD SPECIMEN: NORMAL
IMM GRANULOCYTES # BLD AUTO: 0.26 10*3/MM3 (ref 0–0.05)
IMM GRANULOCYTES NFR BLD AUTO: 2.4 % (ref 0–0.5)
INHALED O2 CONCENTRATION: 100 %
INHALED O2 CONCENTRATION: 40 %
INHALED O2 CONCENTRATION: 90 %
IPAP: 0
IPAP: 18
LEFT ATRIUM VOLUME INDEX: 30.6 ML/M^2
LEFT ATRIUM VOLUME: 84.9 ML
LYMPHOCYTES # BLD AUTO: 1.54 10*3/MM3 (ref 0.7–3.1)
LYMPHOCYTES NFR BLD AUTO: 14.2 % (ref 19.6–45.3)
Lab: ABNORMAL
MAGNESIUM SERPL-MCNC: 2 MG/DL (ref 1.6–2.6)
MAXIMAL PREDICTED HEART RATE: 176 BPM
MCH RBC QN AUTO: 26.8 PG (ref 26.6–33)
MCHC RBC AUTO-ENTMCNC: 29.5 G/DL (ref 31.5–35.7)
MCV RBC AUTO: 90.9 FL (ref 79–97)
METHGB BLD QL: 0.1 % (ref 0–1.5)
MODALITY: ABNORMAL
MONOCYTES # BLD AUTO: 0.69 10*3/MM3 (ref 0.1–0.9)
MONOCYTES NFR BLD AUTO: 6.4 % (ref 5–12)
NEUTROPHILS NFR BLD AUTO: 76.3 % (ref 42.7–76)
NEUTROPHILS NFR BLD AUTO: 8.29 10*3/MM3 (ref 1.7–7)
NOTE: ABNORMAL
NOTIFIED BY: ABNORMAL
NOTIFIED WHO: ABNORMAL
NRBC BLD AUTO-RTO: 0.9 /100 WBC (ref 0–0.2)
NT-PROBNP SERPL-MCNC: 845.1 PG/ML (ref 0–450)
OXYHGB MFR BLDV: 84.8 % (ref 94–99)
OXYHGB MFR BLDV: 87.5 % (ref 94–99)
OXYHGB MFR BLDV: 93.4 % (ref 94–99)
PAW @ PEAK INSP FLOW SETTING VENT: 0 CMH2O
PCO2 BLDA: 68.9 MM HG (ref 35–45)
PCO2 BLDA: 69.6 MM HG (ref 35–45)
PCO2 BLDA: 77.9 MM HG (ref 35–45)
PCO2 TEMP ADJ BLD: 68.9 MM HG (ref 35–48)
PCO2 TEMP ADJ BLD: 69.6 MM HG (ref 35–48)
PCO2 TEMP ADJ BLD: 77.9 MM HG (ref 35–48)
PH BLDA: 7.28 PH UNITS (ref 7.35–7.45)
PH BLDA: 7.33 PH UNITS (ref 7.35–7.45)
PH BLDA: 7.34 PH UNITS (ref 7.35–7.45)
PH, TEMP CORRECTED: 7.28 PH UNITS
PH, TEMP CORRECTED: 7.33 PH UNITS
PH, TEMP CORRECTED: 7.34 PH UNITS
PLATELET # BLD AUTO: 219 10*3/MM3 (ref 140–450)
PMV BLD AUTO: 10.5 FL (ref 6–12)
PO2 BLDA: 54.9 MM HG (ref 83–108)
PO2 BLDA: 61.3 MM HG (ref 83–108)
PO2 BLDA: 83.7 MM HG (ref 83–108)
PO2 TEMP ADJ BLD: 54.9 MM HG (ref 83–108)
PO2 TEMP ADJ BLD: 61.3 MM HG (ref 83–108)
PO2 TEMP ADJ BLD: 83.7 MM HG (ref 83–108)
POTASSIUM SERPL-SCNC: 4.2 MMOL/L (ref 3.5–5.2)
POTASSIUM SERPL-SCNC: 4.6 MMOL/L (ref 3.5–5.2)
PROCALCITONIN SERPL-MCNC: 0.13 NG/ML (ref 0–0.25)
PROT SERPL-MCNC: 7.3 G/DL (ref 6–8.5)
QT INTERVAL: 380 MS
QTC INTERVAL: 416 MS
RBC # BLD AUTO: 6.26 10*6/MM3 (ref 4.14–5.8)
SARS-COV-2 RNA RESP QL NAA+PROBE: NOT DETECTED
SET MECH RESP RATE: 20
SODIUM SERPL-SCNC: 136 MMOL/L (ref 136–145)
STRESS TARGET HR: 150 BPM
TOTAL RATE: 0 BREATHS/MINUTE
TOTAL RATE: 22 BREATHS/MINUTE
TROPONIN T SERPL-MCNC: 0.02 NG/ML (ref 0–0.03)
VENTILATOR MODE: ABNORMAL
VENTILATOR MODE: ABNORMAL
WBC # BLD AUTO: 10.86 10*3/MM3 (ref 3.4–10.8)
WHOLE BLOOD HOLD SPECIMEN: NORMAL

## 2021-07-12 PROCEDURE — 25010000002 FUROSEMIDE PER 20 MG: Performed by: EMERGENCY MEDICINE

## 2021-07-12 PROCEDURE — 82375 ASSAY CARBOXYHB QUANT: CPT

## 2021-07-12 PROCEDURE — 80074 ACUTE HEPATITIS PANEL: CPT | Performed by: INTERNAL MEDICINE

## 2021-07-12 PROCEDURE — 83735 ASSAY OF MAGNESIUM: CPT | Performed by: NURSE PRACTITIONER

## 2021-07-12 PROCEDURE — 83050 HGB METHEMOGLOBIN QUAN: CPT

## 2021-07-12 PROCEDURE — 25010000002 PIPERACILLIN SOD-TAZOBACTAM PER 1 G: Performed by: INTERNAL MEDICINE

## 2021-07-12 PROCEDURE — 80053 COMPREHEN METABOLIC PANEL: CPT | Performed by: EMERGENCY MEDICINE

## 2021-07-12 PROCEDURE — 99284 EMERGENCY DEPT VISIT MOD MDM: CPT

## 2021-07-12 PROCEDURE — 84132 ASSAY OF SERUM POTASSIUM: CPT | Performed by: NURSE PRACTITIONER

## 2021-07-12 PROCEDURE — 94799 UNLISTED PULMONARY SVC/PX: CPT

## 2021-07-12 PROCEDURE — 25010000002 ENOXAPARIN PER 10 MG: Performed by: NURSE PRACTITIONER

## 2021-07-12 PROCEDURE — 99291 CRITICAL CARE FIRST HOUR: CPT | Performed by: INTERNAL MEDICINE

## 2021-07-12 PROCEDURE — 71275 CT ANGIOGRAPHY CHEST: CPT

## 2021-07-12 PROCEDURE — 85025 COMPLETE CBC W/AUTO DIFF WBC: CPT | Performed by: EMERGENCY MEDICINE

## 2021-07-12 PROCEDURE — 71045 X-RAY EXAM CHEST 1 VIEW: CPT

## 2021-07-12 PROCEDURE — 84145 PROCALCITONIN (PCT): CPT | Performed by: EMERGENCY MEDICINE

## 2021-07-12 PROCEDURE — 83880 ASSAY OF NATRIURETIC PEPTIDE: CPT | Performed by: EMERGENCY MEDICINE

## 2021-07-12 PROCEDURE — 87040 BLOOD CULTURE FOR BACTERIA: CPT | Performed by: EMERGENCY MEDICINE

## 2021-07-12 PROCEDURE — 25010000002 VANCOMYCIN 1 G RECONSTITUTED SOLUTION

## 2021-07-12 PROCEDURE — 36600 WITHDRAWAL OF ARTERIAL BLOOD: CPT

## 2021-07-12 PROCEDURE — 94660 CPAP INITIATION&MGMT: CPT

## 2021-07-12 PROCEDURE — 94640 AIRWAY INHALATION TREATMENT: CPT

## 2021-07-12 PROCEDURE — 83605 ASSAY OF LACTIC ACID: CPT | Performed by: EMERGENCY MEDICINE

## 2021-07-12 PROCEDURE — 93306 TTE W/DOPPLER COMPLETE: CPT

## 2021-07-12 PROCEDURE — U0003 INFECTIOUS AGENT DETECTION BY NUCLEIC ACID (DNA OR RNA); SEVERE ACUTE RESPIRATORY SYNDROME CORONAVIRUS 2 (SARS-COV-2) (CORONAVIRUS DISEASE [COVID-19]), AMPLIFIED PROBE TECHNIQUE, MAKING USE OF HIGH THROUGHPUT TECHNOLOGIES AS DESCRIBED BY CMS-2020-01-R: HCPCS | Performed by: EMERGENCY MEDICINE

## 2021-07-12 PROCEDURE — 82805 BLOOD GASES W/O2 SATURATION: CPT

## 2021-07-12 PROCEDURE — 84484 ASSAY OF TROPONIN QUANT: CPT | Performed by: EMERGENCY MEDICINE

## 2021-07-12 PROCEDURE — 36415 COLL VENOUS BLD VENIPUNCTURE: CPT

## 2021-07-12 PROCEDURE — 93005 ELECTROCARDIOGRAM TRACING: CPT | Performed by: EMERGENCY MEDICINE

## 2021-07-12 PROCEDURE — 0 IOPAMIDOL PER 1 ML: Performed by: EMERGENCY MEDICINE

## 2021-07-12 RX ORDER — MAGNESIUM SULFATE HEPTAHYDRATE 40 MG/ML
2 INJECTION, SOLUTION INTRAVENOUS AS NEEDED
Status: DISCONTINUED | OUTPATIENT
Start: 2021-07-12 | End: 2021-07-22 | Stop reason: HOSPADM

## 2021-07-12 RX ORDER — IPRATROPIUM BROMIDE AND ALBUTEROL SULFATE 2.5; .5 MG/3ML; MG/3ML
3 SOLUTION RESPIRATORY (INHALATION)
Status: DISCONTINUED | OUTPATIENT
Start: 2021-07-12 | End: 2021-07-13

## 2021-07-12 RX ORDER — LOSARTAN POTASSIUM 50 MG/1
100 TABLET ORAL
Status: DISCONTINUED | OUTPATIENT
Start: 2021-07-12 | End: 2021-07-13

## 2021-07-12 RX ORDER — PANTOPRAZOLE SODIUM 40 MG/10ML
40 INJECTION, POWDER, LYOPHILIZED, FOR SOLUTION INTRAVENOUS
Status: DISCONTINUED | OUTPATIENT
Start: 2021-07-13 | End: 2021-07-14

## 2021-07-12 RX ORDER — MAGNESIUM SULFATE HEPTAHYDRATE 40 MG/ML
4 INJECTION, SOLUTION INTRAVENOUS AS NEEDED
Status: DISCONTINUED | OUTPATIENT
Start: 2021-07-12 | End: 2021-07-22 | Stop reason: HOSPADM

## 2021-07-12 RX ORDER — SODIUM CHLORIDE 0.9 % (FLUSH) 0.9 %
10 SYRINGE (ML) INJECTION AS NEEDED
Status: DISCONTINUED | OUTPATIENT
Start: 2021-07-12 | End: 2021-07-13

## 2021-07-12 RX ORDER — AMLODIPINE BESYLATE 10 MG/1
10 TABLET ORAL
Status: DISCONTINUED | OUTPATIENT
Start: 2021-07-12 | End: 2021-07-13

## 2021-07-12 RX ORDER — POTASSIUM CHLORIDE 7.45 MG/ML
10 INJECTION INTRAVENOUS
Status: DISCONTINUED | OUTPATIENT
Start: 2021-07-12 | End: 2021-07-22 | Stop reason: HOSPADM

## 2021-07-12 RX ORDER — SODIUM CHLORIDE 0.9 % (FLUSH) 0.9 %
10 SYRINGE (ML) INJECTION EVERY 12 HOURS SCHEDULED
Status: DISCONTINUED | OUTPATIENT
Start: 2021-07-12 | End: 2021-07-22 | Stop reason: HOSPADM

## 2021-07-12 RX ORDER — FUROSEMIDE 10 MG/ML
60 INJECTION INTRAMUSCULAR; INTRAVENOUS ONCE
Status: COMPLETED | OUTPATIENT
Start: 2021-07-12 | End: 2021-07-12

## 2021-07-12 RX ORDER — SODIUM CHLORIDE 0.9 % (FLUSH) 0.9 %
10 SYRINGE (ML) INJECTION AS NEEDED
Status: DISCONTINUED | OUTPATIENT
Start: 2021-07-12 | End: 2021-07-22 | Stop reason: HOSPADM

## 2021-07-12 RX ORDER — IPRATROPIUM BROMIDE AND ALBUTEROL SULFATE 2.5; .5 MG/3ML; MG/3ML
3 SOLUTION RESPIRATORY (INHALATION) EVERY 6 HOURS PRN
Status: DISCONTINUED | OUTPATIENT
Start: 2021-07-12 | End: 2021-07-22 | Stop reason: HOSPADM

## 2021-07-12 RX ADMIN — NICARDIPINE HYDROCHLORIDE 5 MG/HR: 0.1 INJECTION, SOLUTION INTRAVENOUS at 16:33

## 2021-07-12 RX ADMIN — LOSARTAN POTASSIUM 100 MG: 50 TABLET, FILM COATED ORAL at 23:10

## 2021-07-12 RX ADMIN — IOPAMIDOL 90 ML: 755 INJECTION, SOLUTION INTRAVENOUS at 22:32

## 2021-07-12 RX ADMIN — IPRATROPIUM BROMIDE AND ALBUTEROL SULFATE 3 ML: 2.5; .5 SOLUTION RESPIRATORY (INHALATION) at 19:42

## 2021-07-12 RX ADMIN — FUROSEMIDE 60 MG: 10 INJECTION INTRAMUSCULAR; INTRAVENOUS at 18:40

## 2021-07-12 RX ADMIN — SODIUM CHLORIDE, PRESERVATIVE FREE 10 ML: 5 INJECTION INTRAVENOUS at 20:43

## 2021-07-12 RX ADMIN — TAZOBACTAM SODIUM AND PIPERACILLIN SODIUM 4.5 G: 500; 4 INJECTION, SOLUTION INTRAVENOUS at 23:33

## 2021-07-12 RX ADMIN — ENOXAPARIN SODIUM 40 MG: 40 INJECTION SUBCUTANEOUS at 20:43

## 2021-07-12 RX ADMIN — VANCOMYCIN HYDROCHLORIDE 3000 MG: 1 INJECTION, POWDER, LYOPHILIZED, FOR SOLUTION INTRAVENOUS at 22:56

## 2021-07-12 RX ADMIN — AMLODIPINE BESYLATE 10 MG: 10 TABLET ORAL at 23:10

## 2021-07-12 NOTE — ED PROVIDER NOTES
" EMERGENCY DEPARTMENT ENCOUNTER    Pt Name: Norberto Keita  MRN: [id number]  Pt :   1975  Room Number:    Date of encounter:  2021  PCP: Cam Cheung MD  ED Provider: Chris Isbell MD    Historian: Patient and wife      HPI:  Chief Complaint: Shortness of breath and weakness        Context: Norberto Keita is a 45 y.o. male who presents to the ED c/o shortness of breath and weakness ongoing for the last 3 to 4 weeks.  Symptoms are gradually increased in severity.  This morning his blood pressure was checked and found to be in excess of 110 diastolic.  Patient has had very difficult to control hypertension.  His last blood pressure medication change was roughly a month ago in which she is amlodipine was decreased from 10 mg down to 5 and a \"water pill\" was added.  He ran out of the water pill 1 week ago and therefore increased his amlodipine back up to 10 mg/day.  Patient reports a chronic cough which she is attributed to his lisinopril.  He has had no fevers, chills, nausea vomiting or diarrhea.  Patient drinks 3 or 4 times per week at a moderate to significant amount of alcohol per evening.        PAST MEDICAL HISTORY  Past Medical History:   Diagnosis Date   • Decreased libido     low libido can be multifactorial, will check labs and follow up. did discuss with pt various treatment options    • Hypertension    • Sacroiliitis (CMS/HCC)          PAST SURGICAL HISTORY  Past Surgical History:   Procedure Laterality Date   • HYDROCELE EXCISION / REPAIR       Surgery [facility] Vaginalis Excision of Hydrocele   • KNEE SURGERY Left    • VASECTOMY           FAMILY HISTORY  Family History   Problem Relation Age of Onset   • Diabetes Mother    • Hyperlipidemia Mother    • Hypertension Mother    • Coronary artery disease Father    • Hyperlipidemia Father    • Hypertension Father    • Heart disease Father    • Heart failure Father    • Deep vein thrombosis Father          SOCIAL HISTORY  Social " History     Socioeconomic History   • Marital status:      Spouse name: Not on file   • Number of children: Not on file   • Years of education: Not on file   • Highest education level: Not on file   Tobacco Use   • Smoking status: Never Smoker   • Smokeless tobacco: Never Used   Substance and Sexual Activity   • Alcohol use: Yes     Comment: 3-4 times weekly   • Drug use: No   • Sexual activity: Defer         ALLERGIES  Lisinopril        REVIEW OF SYSTEMS  Review of Systems       All systems reviewed and negative except for those discussed in HPI.       PHYSICAL EXAM    I have reviewed the triage vital signs and nursing notes.    ED Triage Vitals [07/12/21 1530]   Temp Heart Rate Resp BP SpO2   98.6 °F (37 °C) 75 24 (!) 166/105 (!) 79 %      Temp src Heart Rate Source Patient Position BP Location FiO2 (%)   -- -- -- -- --       Physical Exam  GENERAL:   Appears mildly dyspneic while on oxygen via nasal cannula at 6 L (patient does not wear home oxygen) and with oxygen saturations running in the 85 to 86% range.  HENT: Nares patent.  Dry mucous membranes  EYES: No scleral icterus.  CV: Regular rhythm, regular rate.  No murmurs gallops rubs  RESPIRATORY: Normal effort.  No audible wheezes, rales or rhonchi.  Diminished breath sounds in lower half of lung fields possibly secondary to body habitus.  ABDOMEN: Soft, nontender, protuberant with adipose.  MUSCULOSKELETAL: No deformities.   NEURO: Alert, moves all extremities, follows commands.  SKIN: Warm, dry, no rash visualized.        LAB RESULTS  Recent Results (from the past 24 hour(s))   ECG 12 Lead    Collection Time: 07/12/21  3:45 PM   Result Value Ref Range    QT Interval 380 ms    QTC Interval 416 ms   Comprehensive Metabolic Panel    Collection Time: 07/12/21  3:52 PM    Specimen: Blood   Result Value Ref Range    Glucose 106 (H) 65 - 99 mg/dL    BUN 10 6 - 20 mg/dL    Creatinine 0.72 (L) 0.76 - 1.27 mg/dL    Sodium 136 136 - 145 mmol/L    Potassium 4.6  3.5 - 5.2 mmol/L    Chloride 97 (L) 98 - 107 mmol/L    CO2 33.0 (H) 22.0 - 29.0 mmol/L    Calcium 9.0 8.6 - 10.5 mg/dL    Total Protein 7.3 6.0 - 8.5 g/dL    Albumin 4.10 3.50 - 5.20 g/dL    ALT (SGPT) 73 (H) 1 - 41 U/L    AST (SGOT) 34 1 - 40 U/L    Alkaline Phosphatase 131 (H) 39 - 117 U/L    Total Bilirubin 1.5 (H) 0.0 - 1.2 mg/dL    eGFR Non African Amer 119 >60 mL/min/1.73    Globulin 3.2 gm/dL    A/G Ratio 1.3 g/dL    BUN/Creatinine Ratio 13.9 7.0 - 25.0    Anion Gap 6.0 5.0 - 15.0 mmol/L   BNP    Collection Time: 07/12/21  3:52 PM    Specimen: Blood   Result Value Ref Range    proBNP 845.1 (H) 0.0 - 450.0 pg/mL   Troponin    Collection Time: 07/12/21  3:52 PM    Specimen: Blood   Result Value Ref Range    Troponin T 0.019 0.000 - 0.030 ng/mL   Green Top (Gel)    Collection Time: 07/12/21  3:52 PM   Result Value Ref Range    Extra Tube Hold for add-ons.    Lavender Top    Collection Time: 07/12/21  3:52 PM   Result Value Ref Range    Extra Tube hold for add-on    Gold Top - SST    Collection Time: 07/12/21  3:52 PM   Result Value Ref Range    Extra Tube Hold for add-ons.    CBC Auto Differential    Collection Time: 07/12/21  3:52 PM    Specimen: Blood   Result Value Ref Range    WBC 10.86 (H) 3.40 - 10.80 10*3/mm3    RBC 6.26 (H) 4.14 - 5.80 10*6/mm3    Hemoglobin 16.8 13.0 - 17.7 g/dL    Hematocrit 56.9 (H) 37.5 - 51.0 %    MCV 90.9 79.0 - 97.0 fL    MCH 26.8 26.6 - 33.0 pg    MCHC 29.5 (L) 31.5 - 35.7 g/dL    RDW 16.0 (H) 12.3 - 15.4 %    RDW-SD 48.2 37.0 - 54.0 fl    MPV 10.5 6.0 - 12.0 fL    Platelets 219 140 - 450 10*3/mm3    Neutrophil % 76.3 (H) 42.7 - 76.0 %    Lymphocyte % 14.2 (L) 19.6 - 45.3 %    Monocyte % 6.4 5.0 - 12.0 %    Eosinophil % 0.0 (L) 0.3 - 6.2 %    Basophil % 0.7 0.0 - 1.5 %    Immature Grans % 2.4 (H) 0.0 - 0.5 %    Neutrophils, Absolute 8.29 (H) 1.70 - 7.00 10*3/mm3    Lymphocytes, Absolute 1.54 0.70 - 3.10 10*3/mm3    Monocytes, Absolute 0.69 0.10 - 0.90 10*3/mm3    Eosinophils,  Absolute 0.00 0.00 - 0.40 10*3/mm3    Basophils, Absolute 0.08 0.00 - 0.20 10*3/mm3    Immature Grans, Absolute 0.26 (H) 0.00 - 0.05 10*3/mm3    nRBC 0.9 (H) 0.0 - 0.2 /100 WBC   Blood Gas, Arterial With Co-Ox    Collection Time: 07/12/21  4:05 PM    Specimen: Arterial Blood   Result Value Ref Range    Site Right Radial     Jasper's Test Positive     pH, Arterial 7.325 (L) 7.350 - 7.450 pH units    pCO2, Arterial 68.9 (C) 35.0 - 45.0 mm Hg    pO2, Arterial 61.3 (L) 83.0 - 108.0 mm Hg    HCO3, Arterial 35.9 (H) 20.0 - 26.0 mmol/L    Base Excess, Arterial 6.6 (H) 0.0 - 2.0 mmol/L    Hemoglobin, Blood Gas 17.2 13.5 - 17.5 g/dL    Hematocrit, Blood Gas 52.8 %    Oxyhemoglobin 87.5 (L) 94 - 99 %    Methemoglobin 0.10 0.00 - 1.50 %    Carboxyhemoglobin 1.9 0 - 2 %    CO2 Content 38.0 (H) 22 - 33 mmol/L    Temperature 37.0 C    Barometric Pressure for Blood Gas      Modality Nasal Cannula     FIO2 40 %    Ventilator Mode       Note      Notified Who DR ZURITA     Notified By [id number]     Notified Time 07/12/2021 16:09     pH, Temp Corrected 7.325 pH Units    pCO2, Temperature Corrected 68.9 (H) 35 - 48 mm Hg    pO2, Temperature Corrected 61.3 (L) 83 - 108 mm Hg       If labs were ordered, I independently reviewed the results.        RADIOLOGY  XR Chest 1 View    Result Date: 7/12/2021  EXAMINATION: XR CHEST 1 VW-07/12/2021:  INDICATION: SOA, triage protocol.  COMPARISON: Chest x-ray 02/19/2011.  FINDINGS: Cardiac size enlarged with increased central vascular congestion and interstitial predominant pulmonary opacifications, bilateral, of airspace disease versus potential edema. No pleural effusion to suggest decompensation.      Cardiomegaly with increased central vascular congestion and interstitial predominant pulmonary opacifications, bilateral, of airspace disease versus potential edema. No pleural effusion to suggest decompensation.  D:  07/12/2021 E:  07/12/2021          I ordered and reviewed the above noted  radiographic studies.      I viewed images of chest x-ray which showed bilateral pulmonary infiltrates per my independent interpretation.    See radiologist's dictation for official interpretation.        PROCEDURES    Critical Care  Performed by: Ramses Zurita MD  Authorized by: Ramses Zurita MD     Critical care provider statement:     Critical care time (minutes):  35    Critical care time was exclusive of:  Separately billable procedures and treating other patients    Critical care was necessary to treat or prevent imminent or life-threatening deterioration of the following conditions:  Respiratory failure    Critical care was time spent personally by me on the following activities:  Ordering and performing treatments and interventions, ordering and review of laboratory studies, ordering and review of radiographic studies, pulse oximetry, re-evaluation of patient's condition, review of old charts, obtaining history from patient or surrogate, examination of patient, evaluation of patient's response to treatment, discussions with consultants and development of treatment plan with patient or surrogate        ECG 12 Lead   Final Result   Test Reason : SOA Protocol   Blood Pressure :   */*   mmHG   Vent. Rate :  72 BPM     Atrial Rate :  72 BPM      P-R Int : 204 ms          QRS Dur :  82 ms       QT Int : 380 ms       P-R-T Axes :  20  -2  18 degrees      QTc Int : 416 ms      Normal sinus rhythm   ST elevation, consider early repolarization, pericarditis, or injury   Abnormal ECG   No previous ECGs available   Confirmed by RAMSES ZURITA MD ([id number]) on 7/12/2021 5:02:20 PM      Referred By: ED MD           Confirmed By: RAMSES ZURITA MD          MEDICATIONS GIVEN IN ER    Medications   sodium chloride 0.9 % flush 10 mL (has no administration in time range)   niCARdipine (CARDENE) 20 mg in 200 mL NS infusion (7.5 mg/hr Intravenous Rate/Dose Change 7/12/21 7654)         PROGRESS, DATA ANALYSIS, CONSULTS, AND  MEDICAL DECISION MAKING    All labs have been independently reviewed by me.  All radiology studies have been reviewed by me and the radiologist dictating the report.   EKG's have been independently viewed and interpreted by me.      Differential diagnoses: Respiratory failure with hypoxia and hypercarbia.  This could reflect CHF, pneumonia, pulmonary embolus, etc.      ED Course as of Jul 12 2154   Mon Jul 12, 2021   1711 I have paged the intensivist Dr. Hoffmann for admission.    [MS]   1726 I spoke with Dr. Hoffmann who will admit to the ICU.  CT scan is still pending.  We will get his oxygen saturation up a little higher prior to sending him over.    [MS]      ED Course User Index  [MS] Chris Isbell MD             AS OF 17:04 EDT VITALS:    BP - (!) 166/125  HR - 70  TEMP - 98.6 °F (37 °C)  O2 SATS - (!) 85%        DIAGNOSIS  Final diagnoses:   Acute respiratory failure with hypoxia and hypercarbia (CMS/HCC)         DISPOSITION  Admission            Chris Isbell MD  07/12/21 6778

## 2021-07-13 PROBLEM — E78.2 MIXED HYPERLIPIDEMIA: Status: ACTIVE | Noted: 2021-07-13

## 2021-07-13 PROBLEM — R73.03 PREDIABETES: Status: ACTIVE | Noted: 2021-07-13

## 2021-07-13 PROBLEM — I50.43 ACUTE ON CHRONIC COMBINED SYSTOLIC AND DIASTOLIC CHF (CONGESTIVE HEART FAILURE) (HCC): Status: ACTIVE | Noted: 2021-07-13

## 2021-07-13 LAB
ANION GAP SERPL CALCULATED.3IONS-SCNC: 11 MMOL/L (ref 5–15)
ANION GAP SERPL CALCULATED.3IONS-SCNC: 16 MMOL/L (ref 5–15)
BUN SERPL-MCNC: 10 MG/DL (ref 6–20)
BUN SERPL-MCNC: 9 MG/DL (ref 6–20)
BUN/CREAT SERPL: 10.3 (ref 7–25)
BUN/CREAT SERPL: 11 (ref 7–25)
CALCIUM SPEC-SCNC: 8.7 MG/DL (ref 8.6–10.5)
CALCIUM SPEC-SCNC: 8.9 MG/DL (ref 8.6–10.5)
CHLORIDE SERPL-SCNC: 92 MMOL/L (ref 98–107)
CHLORIDE SERPL-SCNC: 96 MMOL/L (ref 98–107)
CO2 SERPL-SCNC: 26 MMOL/L (ref 22–29)
CO2 SERPL-SCNC: 33 MMOL/L (ref 22–29)
CREAT SERPL-MCNC: 0.87 MG/DL (ref 0.76–1.27)
CREAT SERPL-MCNC: 0.91 MG/DL (ref 0.76–1.27)
GFR SERPL CREATININE-BSD FRML MDRD: 91 ML/MIN/1.73
GFR SERPL CREATININE-BSD FRML MDRD: 95 ML/MIN/1.73
GLUCOSE SERPL-MCNC: 117 MG/DL (ref 65–99)
GLUCOSE SERPL-MCNC: 121 MG/DL (ref 65–99)
MAGNESIUM SERPL-MCNC: 1.7 MG/DL (ref 1.6–2.6)
MAGNESIUM SERPL-MCNC: 2 MG/DL (ref 1.6–2.6)
MRSA DNA SPEC QL NAA+PROBE: NEGATIVE
PHOSPHATE SERPL-MCNC: 2.7 MG/DL (ref 2.5–4.5)
PHOSPHATE SERPL-MCNC: 2.9 MG/DL (ref 2.5–4.5)
POTASSIUM SERPL-SCNC: 3.8 MMOL/L (ref 3.5–5.2)
POTASSIUM SERPL-SCNC: 4.2 MMOL/L (ref 3.5–5.2)
POTASSIUM SERPL-SCNC: 4.3 MMOL/L (ref 3.5–5.2)
SODIUM SERPL-SCNC: 136 MMOL/L (ref 136–145)
SODIUM SERPL-SCNC: 138 MMOL/L (ref 136–145)
URATE SERPL-MCNC: 6.2 MG/DL (ref 3.4–7)

## 2021-07-13 PROCEDURE — 80048 BASIC METABOLIC PNL TOTAL CA: CPT | Performed by: INTERNAL MEDICINE

## 2021-07-13 PROCEDURE — 84132 ASSAY OF SERUM POTASSIUM: CPT | Performed by: NURSE PRACTITIONER

## 2021-07-13 PROCEDURE — 94640 AIRWAY INHALATION TREATMENT: CPT

## 2021-07-13 PROCEDURE — 99253 IP/OBS CNSLTJ NEW/EST LOW 45: CPT | Performed by: INTERNAL MEDICINE

## 2021-07-13 PROCEDURE — 94799 UNLISTED PULMONARY SVC/PX: CPT

## 2021-07-13 PROCEDURE — 99291 CRITICAL CARE FIRST HOUR: CPT | Performed by: INTERNAL MEDICINE

## 2021-07-13 PROCEDURE — 87205 SMEAR GRAM STAIN: CPT | Performed by: INTERNAL MEDICINE

## 2021-07-13 PROCEDURE — 87070 CULTURE OTHR SPECIMN AEROBIC: CPT | Performed by: INTERNAL MEDICINE

## 2021-07-13 PROCEDURE — 25010000002 ENOXAPARIN PER 10 MG: Performed by: INTERNAL MEDICINE

## 2021-07-13 PROCEDURE — 83735 ASSAY OF MAGNESIUM: CPT | Performed by: NURSE PRACTITIONER

## 2021-07-13 PROCEDURE — 84100 ASSAY OF PHOSPHORUS: CPT | Performed by: INTERNAL MEDICINE

## 2021-07-13 PROCEDURE — 94660 CPAP INITIATION&MGMT: CPT

## 2021-07-13 PROCEDURE — 84550 ASSAY OF BLOOD/URIC ACID: CPT | Performed by: INTERNAL MEDICINE

## 2021-07-13 PROCEDURE — 83735 ASSAY OF MAGNESIUM: CPT | Performed by: INTERNAL MEDICINE

## 2021-07-13 PROCEDURE — 25010000002 PIPERACILLIN SOD-TAZOBACTAM PER 1 G: Performed by: INTERNAL MEDICINE

## 2021-07-13 PROCEDURE — 25010000003 MAGNESIUM SULFATE 4 GM/100ML SOLUTION: Performed by: NURSE PRACTITIONER

## 2021-07-13 PROCEDURE — 94669 MECHANICAL CHEST WALL OSCILL: CPT

## 2021-07-13 PROCEDURE — 87641 MR-STAPH DNA AMP PROBE: CPT

## 2021-07-13 RX ORDER — NITROGLYCERIN 20 MG/100ML
5-200 INJECTION INTRAVENOUS
Status: DISCONTINUED | OUTPATIENT
Start: 2021-07-13 | End: 2021-07-15

## 2021-07-13 RX ORDER — COLCHICINE 0.6 MG/1
0.6 TABLET ORAL ONCE
Status: COMPLETED | OUTPATIENT
Start: 2021-07-13 | End: 2021-07-13

## 2021-07-13 RX ORDER — IPRATROPIUM BROMIDE AND ALBUTEROL SULFATE 2.5; .5 MG/3ML; MG/3ML
3 SOLUTION RESPIRATORY (INHALATION)
Status: DISCONTINUED | OUTPATIENT
Start: 2021-07-13 | End: 2021-07-21

## 2021-07-13 RX ORDER — CARVEDILOL 6.25 MG/1
6.25 TABLET ORAL EVERY 12 HOURS SCHEDULED
Status: DISCONTINUED | OUTPATIENT
Start: 2021-07-13 | End: 2021-07-14

## 2021-07-13 RX ORDER — ACETAMINOPHEN 325 MG/1
650 TABLET ORAL EVERY 6 HOURS PRN
Status: DISCONTINUED | OUTPATIENT
Start: 2021-07-13 | End: 2021-07-22 | Stop reason: HOSPADM

## 2021-07-13 RX ORDER — BUMETANIDE 0.25 MG/ML
2 INJECTION INTRAMUSCULAR; INTRAVENOUS ONCE
Status: COMPLETED | OUTPATIENT
Start: 2021-07-13 | End: 2021-07-13

## 2021-07-13 RX ADMIN — SODIUM CHLORIDE, PRESERVATIVE FREE 10 ML: 5 INJECTION INTRAVENOUS at 11:13

## 2021-07-13 RX ADMIN — ENOXAPARIN SODIUM 30 MG: 30 INJECTION SUBCUTANEOUS at 11:11

## 2021-07-13 RX ADMIN — BUMETANIDE 2 MG: 0.25 INJECTION, SOLUTION INTRAMUSCULAR; INTRAVENOUS at 11:03

## 2021-07-13 RX ADMIN — IPRATROPIUM BROMIDE AND ALBUTEROL SULFATE 3 ML: 2.5; .5 SOLUTION RESPIRATORY (INHALATION) at 01:29

## 2021-07-13 RX ADMIN — ACETAMINOPHEN 650 MG: 325 TABLET ORAL at 20:36

## 2021-07-13 RX ADMIN — IPRATROPIUM BROMIDE AND ALBUTEROL SULFATE 3 ML: 2.5; .5 SOLUTION RESPIRATORY (INHALATION) at 19:34

## 2021-07-13 RX ADMIN — COLCHICINE 0.6 MG: 0.6 TABLET, FILM COATED ORAL at 15:13

## 2021-07-13 RX ADMIN — PANTOPRAZOLE SODIUM 40 MG: 40 INJECTION, POWDER, FOR SOLUTION INTRAVENOUS at 05:32

## 2021-07-13 RX ADMIN — IPRATROPIUM BROMIDE AND ALBUTEROL SULFATE 3 ML: 2.5; .5 SOLUTION RESPIRATORY (INHALATION) at 23:11

## 2021-07-13 RX ADMIN — ENOXAPARIN SODIUM 30 MG: 30 INJECTION SUBCUTANEOUS at 20:36

## 2021-07-13 RX ADMIN — MAGNESIUM SULFATE HEPTAHYDRATE 4 G: 40 INJECTION, SOLUTION INTRAVENOUS at 06:00

## 2021-07-13 RX ADMIN — IPRATROPIUM BROMIDE AND ALBUTEROL SULFATE 3 ML: 2.5; .5 SOLUTION RESPIRATORY (INHALATION) at 07:54

## 2021-07-13 RX ADMIN — TAZOBACTAM SODIUM AND PIPERACILLIN SODIUM 4.5 G: 500; 4 INJECTION, SOLUTION INTRAVENOUS at 13:23

## 2021-07-13 RX ADMIN — TAZOBACTAM SODIUM AND PIPERACILLIN SODIUM 4.5 G: 500; 4 INJECTION, SOLUTION INTRAVENOUS at 05:32

## 2021-07-13 RX ADMIN — TAZOBACTAM SODIUM AND PIPERACILLIN SODIUM 4.5 G: 500; 4 INJECTION, SOLUTION INTRAVENOUS at 21:44

## 2021-07-13 RX ADMIN — CARVEDILOL 6.25 MG: 6.25 TABLET, FILM COATED ORAL at 21:44

## 2021-07-13 RX ADMIN — CARVEDILOL 6.25 MG: 6.25 TABLET, FILM COATED ORAL at 13:25

## 2021-07-13 RX ADMIN — BUMETANIDE 1 MG/HR: 0.25 INJECTION INTRAMUSCULAR; INTRAVENOUS at 11:11

## 2021-07-13 RX ADMIN — IPRATROPIUM BROMIDE AND ALBUTEROL SULFATE 3 ML: 2.5; .5 SOLUTION RESPIRATORY (INHALATION) at 16:42

## 2021-07-13 NOTE — PROGRESS NOTES
"Pharmacy Consult-Vancomycin Dosing  Norberto Keita is a  45 y.o. male receiving vancomycin therapy.     Indication: PNA  Consulting Provider: intensivist  ID Consult:     Goal AUC: 400 - 600 mg/L*hr    Current Antimicrobial Therapy  Anti-Infectives (From admission, onward)      Ordered     Dose/Rate Route Frequency Start Stop    07/13/21 0018  vancomycin 1500 mg/500 mL 0.9% NS IVPB ([facility])     Ordering Provider: Nate Duval, PharmD    1,500 mg  over 90 Minutes Intravenous Every 12 Hours 07/13/21 1200 07/20/21 1159    07/12/21 2228  piperacillin-tazobactam (ZOSYN) 4.5 g in iso-osmotic dextrose 100 mL IVPB (premix)     Ordering Provider: Nikos Hoffmann MD    4.5 g  over 4 Hours Intravenous Every 8 Hours 07/13/21 0600 07/20/21 0559    07/12/21 2236  vancomycin 3000 mg/500 mL 0.9% NS IVPB ([facility])     Ordering Provider: Nate Duval, PharmD    20 mg/kg × 161 kg  over 180 Minutes Intravenous Once 07/12/21 2330      07/12/21 2228  piperacillin-tazobactam (ZOSYN) 4.5 g in iso-osmotic dextrose 100 mL IVPB (premix)     Ordering Provider: Nikos Hoffmann MD    4.5 g  over 30 Minutes Intravenous Once 07/12/21 2230 07/13/21 0003    07/12/21 2228  Pharmacy to dose vancomycin     Ordering Provider: Nikos Hoffmann MD     Does not apply Continuous PRN 07/12/21 2228 07/19/21 2227            Allergies  Allergies as of 07/12/2021 - Reviewed 07/12/2021   Allergen Reaction Noted    Lisinopril Cough 05/05/2021       Labs    Results from last 7 days   Lab Units 07/12/21  1552   BUN mg/dL 10   CREATININE mg/dL 0.72*       Results from last 7 days   Lab Units 07/12/21  1552   WBC 10*3/mm3 10.86*       Evaluation of Dosing     Last Dose Received in the ED/Outside Facility: none  Is Patient on Dialysis or Renal Replacement: no    Ht - 185.4 cm (73\")  Wt - (!) 167 kg (367 lb 8.1 oz)    Estimated Creatinine Clearance: 213 mL/min (A) (by C-G formula based on SCr of 0.72 mg/dL (L)).    Intake & Output (last 3 days)         07/10 " 0701 - 07/11 0700 07/11 0701 - 07/12 0700 07/12 0701 - 07/13 0700    Urine (mL/kg/hr)   2100    Total Output   2100    Net   -2100                   Microbiology and Radiology  Microbiology Results (last 10 days)       Procedure Component Value - Date/Time    COVID PRE-OP / PRE-PROCEDURE SCREENING ORDER (NO ISOLATION) - Swab, Nasopharynx [[id number]]  (Normal) Collected: 07/12/21 1631    Lab Status: Final result Specimen: Swab from Nasopharynx Updated: 07/12/21 1734    Narrative:      The following orders were created for panel order COVID PRE-OP / PRE-PROCEDURE SCREENING ORDER (NO ISOLATION) - Swab, Nasopharynx.  Procedure                               Abnormality         Status                     ---------                               -----------         ------                     COVID-19,CEPHEID,CHEVY IN-...[[id number]]  Normal              Final result                 Please view results for these tests on the individual orders.    COVID-19,CEPHEID,CHEVY IN-HOUSE(OR EMERGENT/ADD-ON),NP SWAB IN TRANSPORT MEDIA 3-4 HR TAT - Swab, Nasopharynx [[id number]]  (Normal) Collected: 07/12/21 1631    Lab Status: Final result Specimen: Swab from Nasopharynx Updated: 07/12/21 1734     COVID19 Not Detected    Narrative:      Fact sheet for providers: https://www.fda.gov/media/574530/download     Fact sheet for patients: https://www.fda.gov/media/719988/download  Fact sheet for providers: https://www.fda.gov/media/726736/download     Fact sheet for patients: https://www.fda.gov/media/482678/download            Reported Vancomycin Levels                         Insight[facility] AUC Calculation:    Current AUC:   0 mg/L*hr    Predicted Steady State AUC :   538 mg/L*hr    Assessment/Plan: The patient will be started on a bolus of 20mg/kg for a dose of 3000mg . Will initiate maintenance dose at 1500 mg IV every 12 hours.  Plan for trough as patient approaches steady state, prior to the 4th dose.  Due to infection severity, will target an AUC  of 400-600.      Pharmacy will continue to follow the patient’s culture results and clinical progress daily.    Nate Duval, BrendenD

## 2021-07-13 NOTE — CASE MANAGEMENT/SOCIAL WORK
Discharge Planning Assessment  [facility]     Patient Name: Norberto Keita  MRN: [id number]  Today's Date: 7/13/2021    Admit Date: 7/12/2021    Discharge Needs Assessment     Row Name 07/13/21 1129       Living Environment    Lives With  spouse;child(josefa), adult;child(josefa), dependent;other (see comments) Patient lives with his wife, two sons ages 16 & 24 and his 23 y/o niece.    Current Living Arrangements  home/apartment/condo    Primary Care Provided by  self    Provides Primary Care For  no one    Family Caregiver if Needed  spouse    Quality of Family Relationships  supportive    Able to Return to Prior Arrangements  yes       Resource/Environmental Concerns    Resource/Environmental Concerns  none    Transportation Concerns  car, none       Transition Planning    Patient/Family Anticipates Transition to  home with family    Patient/Family Anticipated Services at Transition  none       Discharge Needs Assessment    Readmission Within the Last 30 Days  no previous admission in last 30 days    Equipment Currently Used at Home  none    Concerns to be Addressed  denies needs/concerns at this time    Anticipated Changes Related to Illness  none    Equipment Needed After Discharge  none        Discharge Plan     Row Name 07/13/21 1138       Plan    Plan  Home    Patient/Family in Agreement with Plan  yes    Plan Comments  Patient on BiPAP, spoke with wife at bedside.  Patient resides in [facility] and lives with his wife, two sons ages 16 & 24, and his 23 y/o niece.  Prior to admission patient was independent with ADL's and mobility.  Wife states patient has an appointment with Dr. Hardin for evaluation of need for CPAP this Thursday 7/15/21.  Patient does not have any DME at home and has never used home health.  Patient plans to return home upon discharge and denies any needs at this time.  CM will continue to follow.        Continued Care and Services - Admitted Since 7/12/2021    Coordination has not been  started for this encounter.       Expected Discharge Date and Time     Expected Discharge Date Expected Discharge Time    Jul 20, 2021         Demographic Summary     Row Name 07/13/21 1127       General Information    Admission Type  inpatient    Arrived From  home    Referral Source  admission list    Reason for Consult  discharge planning    Preferred Language  English       Contact Information    Permission Granted to Share Info With          Functional Status    No documentation.       Psychosocial    No documentation.       Abuse/Neglect    No documentation.       Legal    No documentation.       Substance Abuse    No documentation.       Patient Forms    No documentation.           Yasmin Georges RN

## 2021-07-13 NOTE — PLAN OF CARE
Goal Outcome Evaluation:  Plan of Care Reviewed With: patient        Progress: no change  Outcome Summary: Pt A/O. BP stable, remains off Cardene gtt. BiPap at 100% with SATs 88-93%. SATs drop to mid-low 80's w/ activity. UOP > 2900 ( including from time in ED). No BM. Mag 1.7, replaced.

## 2021-07-13 NOTE — PLAN OF CARE
Goal Outcome Evaluation:     2 mg bumex IVP followed by bumex gtt x 6h: diuresed 4650 ml. Repeat BMP checked, e-lytes WNL.  Pt still requiring 100% oxygen via bipap/hi-glory NC; however, has no complaints of shortness of air. IS/pulmonary toilet encouraged, coughing up thick clear/white secretions. Respiratory culture sent, MRSA swab sent (-), vanc dc'd, zosyn continues. Cardiology consult for diastolic CHF, adjusting medications. First dose of coreg 6.25mg given, tolerated well.

## 2021-07-13 NOTE — PROGRESS NOTES
Pulmonary/Critical Care History and Physical Exam     LOS: 0 days   Patient Care Team:  Cam Cheung MD as PCP - General    Chief Complaint:    Chief Complaint   Patient presents with   • Shortness of Breath       Subjective     HPI:     45 y.o. male with history of hypertension, morbid obesity, probable obstructive sleep apnea (awaiting sleep evaluation), who developed increased blood pressure this morning according to his wife with systolic blood pressures in the 110s-120s diastolic, systolics in the 160 range, associated with shortness of breath.  He has had increasing lower extremity edema recently.  He has also had a chronic cough for several months felt possibly be related to lisinopril and this was changed to losartan 2 months ago.  Patient recently had hydrochlorothiazide added due to the leg swelling and his amlodipine was decreased from 10 mg down to 5 mg.  He was seen by cardiology.  Patient has not had a Covid vaccination but did test negative for Covid in the emergency department.  ER note states that the patient ran out of his diuretic 1 week ago and increased his amlodipine back up to 10 mg/day, although he did not report that to me.  Patient has not had fevers, chills, or productive cough.      History taken from: Patient, chart.    Past Medical History:   Diagnosis Date   • Decreased libido     low libido can be multifactorial, will check labs and follow up. did discuss with pt various treatment options    • Gout    • Hypertension    • Sacroiliitis (CMS/HCC)        Past Surgical History:   Procedure Laterality Date   • HYDROCELE EXCISION / REPAIR       Sur[facility] Vaginalis Excision of Hydrocele   • KNEE SURGERY Left    • VASECTOMY         Family History   Problem Relation Age of Onset   • Diabetes Mother    • Hyperlipidemia Mother    • Hypertension Mother    • Coronary artery disease Father    • Hyperlipidemia Father    • Hypertension Father    • Heart disease Father    • Heart failure Father     • Deep vein thrombosis Father        Social History     Socioeconomic History   • Marital status:      Spouse name: Not on file   • Number of children: Not on file   • Years of education: Not on file   • Highest education level: Not on file   Tobacco Use   • Smoking status: Never Smoker   • Smokeless tobacco: Never Used   Substance and Sexual Activity   • Alcohol use: Yes     Comment: 3-4 times weekly   • Drug use: No   • Sexual activity: Defer       Allergies   Allergen Reactions   • Lisinopril Cough       PMH/FH/SocH were reviewed by me and updates were made.     Review of Systems:    Review of 14 systems was completed with positives and pertinent negatives noted in the subjective section.  All other systems reviewed and are negative.       Objective     Vital Signs  Temp:  [98.6 °F (37 °C)] 98.6 °F (37 °C)  Heart Rate:  [66-77] 73  Resp:  [24-25] 25  BP: (123-166)/() 124/86  Body mass index is 45.58 kg/m².       Physical Exam:     General Appearance:   Drowsy/mildly confused, cooperative, in no acute distress   Head:    Normocephalic, without obvious abnormality, atraumatic   Eyes:            Lids and lashes normal, conjunctivae and sclerae normal, no   icterus, no pallor, corneas clear, PERRL   ENMT:   Ears appear intact with no abnormalities noted      No oral lesions, no thrush, oral mucosa moist   Neck:   No adenopathy, supple, trachea midline, no thyromegaly, no   carotid bruit, no JVD   Lungs/resp:    Mildly dyspneic on BiPAP, symmetric chest rise, no crepitus, bilateral wheezes and rales, no chest wall tenderness                  Heart/CV:    Regular rhythm and normal rate, normal S1 and S2, no         murmur   Abdomen/GI:     Normal bowel sounds, no masses, no organomegaly, soft,    nontender, nondistended   G/U:     Deferred   Extremities/MSK:   No clubbing or cyanosis.  2+ bilateral lower extremity edema.  Normal tone.  No deformities.    Pulses:   Pulses palpable and equal bilaterally    Skin:   No bleeding, bruising or rash   Hem/Lymph:   No cervical or supraclavicular adenopathy.    Neurologic:   Moves all extremities with no obvious focal motor deficit.  Cranial nerves 2 - 12 grossly intact            Psychiatric:  Normal mood and affect, oriented x 3.   The above findings are documentation my personal physical examination.  Electronically signed by:Nikos Hoffmann MD 07/12/21 22:06 EDT       Results Review:     I reviewed the patient's new clinical results.   Results from last 7 days   Lab Units 07/12/21  1955 07/12/21  1552   SODIUM mmol/L  --  136   POTASSIUM mmol/L 4.2 4.6   CHLORIDE mmol/L  --  97*   CO2 mmol/L  --  33.0*   BUN mg/dL  --  10   CREATININE mg/dL  --  0.72*   CALCIUM mg/dL  --  9.0   BILIRUBIN mg/dL  --  1.5*   ALK PHOS U/L  --  131*   ALT (SGPT) U/L  --  73*   AST (SGOT) U/L  --  34   GLUCOSE mg/dL  --  106*     Results from last 7 days   Lab Units 07/12/21  1552   WBC 10*3/mm3 10.86*   HEMOGLOBIN g/dL 16.8   HEMATOCRIT % 56.9*   PLATELETS 10*3/mm3 219     Results from last 7 days   Lab Units 07/12/21  1940   PH, ARTERIAL pH units 7.340*   PO2 ART mm Hg 54.9*   PCO2, ARTERIAL mm Hg 69.6*   HCO3 ART mmol/L 37.5*   Procalcitonin 7/12/2021: 0.13      I reviewed the patient's new imaging including images and reports.    Chest x-ray 7/12/2021 shows bilateral pulmonary edema and cardiomegaly.    Medication Review:   enoxaparin, 40 mg, Subcutaneous, Q24H  ipratropium-albuterol, 3 mL, Nebulization, Q6H - RT  [START ON 7/13/2021] pantoprazole, 40 mg, Intravenous, Q AM  sodium chloride, 10 mL, Intravenous, Q12H      niCARdipine, 5-15 mg/hr, Last Rate: Stopped (07/12/21 2012)        Assessment/Plan     Active Hospital Problems    Diagnosis    • Acute on chronic respiratory failure with hypoxia and hypercapnia (CMS/HCC)    • Acute pulmonary edema (CMS/HCC)    • Hypertensive emergency    • Obstructive sleep apnea    • Obesity hypoventilation syndrome (CMS/HCC) -suspected    • Class 3  severe obesity due to excess calories with serious comorbidity and body mass index (BMI) of 40.0 to 44.9 in adult (CMS/[facility])      45 y.o. morbidly obese male lifetime non-smoker seen in the emergency department for dyspnea, cough, and worsening hypertension.  No fevers or chills.  No productive cough.  Cough has been chronic for several months duration.  He is profoundly hypoxemic though does not look terribly dyspneic.  He has evidence of chronic hypoventilation likely from OHS/LEILANI.  He is awaiting evaluation by Dr. Regan Hardin for sleep apnea.  Initial Covid testing negative.  Procalcitonin low arguing against significant bacterial pneumonia.    CTA is pending to rule out pulmonary embolism.  He has had lower extremity edema recently and is certainly at risk for PE.  He does have a family history as his father had a pulmonary embolism/DVT at age 60.    Plan:  1.  Admit to the intensive care unit.  2.  CT angiogram now.  3.  Continue BiPAP for now.  4.  May require intubation/mechanical ventilation.  5.  Diuresis.  6.  Blood pressure management.  7.  Repeat procalcitonin in a.m.  8.  Follow-up cultures.  9.  Hold on antibiotics currently as patient is not having signs and symptoms of active bacterial infection.    Nikos Hoffmann MD  07/12/21  22:06 EDT    The patient is critically ill secondary to acute on chronic mixed respiratory failure and has high risk of life-threatening decline in condition including worsening hypoxemia, respiratory arrest, and death.  As such, he requires continuous monitoring and frequent reassessment for consideration of adjustment in management to minimize this risk.  I personally reassessed him multiple times today.    Critical care time : 50 minutes spent by me personally.(This excludes time spent performing separately reportable procedures and services).   Critical care time includes high complexity decision making to assess, manipulate, and support vital organ system failure in  this individual who has impairment of one or more vital organ systems such that there is a high probability of imminent or life threatening deterioration in the patient’s condition.    Electronically signed by:  Nikos Hoffmann MD  07/12/21  22:16 EDT    Addendum:  CTA of the chest was completed and I reviewed the images.  I am awaiting official radiology reading but I see no evidence of pulmonary embolism.  The patient does have significant basilar atelectasis versus infiltrates concerning for aspiration (in the absence of clinical signs of infectious process).  I am going to start the patient on broad-spectrum antibiotics empirically as the CT scan is more suggestive of pneumonia and atelectasis than it is of pulmonary edema compared to the chest x-ray.  Electronically signed by:  Nikos Hoffmann MD  07/12/21  22:31 EDT    • Please note that portions of this note were completed with [employer] - a voice recognition program.

## 2021-07-13 NOTE — PROGRESS NOTES
INTENSIVIST / PULMONARY FOLLOW UP NOTE     Hospital:  LOS: 1 day   Mr. Norberto Keita, 45 y.o. male is followed for:     Class 3 severe obesity due to excess calories with serious comorbidity and body mass index (BMI) of 40.0 to 44.9 in adult (CMS/HCC)    Acute on chronic respiratory failure with hypoxia and hypercapnia (CMS/HCC)    Acute pulmonary edema (CMS/HCC)    Hypertensive emergency    Obstructive sleep apnea    Obesity hypoventilation syndrome (CMS/HCC) -suspected    Acute respiratory failure with hypoxia and hypercarbia (CMS/HCC)          SUBJECTIVE   Patient remains on 100% FiO2 on Bipap this morning    The patient's relevant past medical, surgical, family, and social history were reviewed    Allergies and medications were reviewed    ROS:  Per subjective, all other systems were reviewed and were negative        OBJECTIVE     Vital Sign Min/Max for last 24 hours:  Temp  Min: 97.9 °F (36.6 °C)  Max: 98.6 °F (37 °C)   BP  Min: 122/83  Max: 166/125   Pulse  Min: 66  Max: 79   Resp  Min: 20  Max: 32   SpO2  Min: 79 %  Max: 96 %   No data recorded     Physical Exam:  General Appearance:  Conversant, in mild distress  Eyes:  No scleral icterus or pallor, pupils normal  Ears, Nose, Mouth, Throat:  Atraumatic, oropharynx clear  Neck:  Trachea midline, thyroid normal  Respiratory: diminished to auscultation bilaterally, normal effort, no tenderness to palpation  Cardiovascular:  Regular rate and rhythm, no murmurs, 2+ peripheral edema, no thrill  Gastrointestinal:  Soft, nontender, nondistended, no hepatosplenomegaly  Skin:  Normal temperature, no rash  Psychiatric:  Alert and oriented x 3, normal judgement and insight  Neuro:  No new focal neurologic deficits observed    Telemetry:              Hemodynamics:   CVP:     PAP:     PAOP:     CO:     CI:     SVI:     SVR:       SpO2: (!) 88 % SpO2  Min: 79 %  Max: 96 %   Device:      Flow Rate:   No data recorded     Mechanical Ventilator Settings:                                          Intake/Ouptut 24 hrs (7:00AM - 6:59 AM)  Intake & Output (last 3 days)       07/10 0701 - 07/11 0700 07/11 0701 - 07/12 0700 07/12 0701 - 07/13 0700 07/13 0701 - 07/14 0700    I.V. (mL/kg)   53.2 (0.3) 100 (0.6)    IV Piggyback   600.9 100    Total Intake(mL/kg)   654.1 (3.9) 200 (1.2)    Urine (mL/kg/hr)   2900     Total Output   2900     Net   -2245.9 +200                  Lines, Drains & Airways    Active LDAs     Name:   Placement date:   Placement time:   Site:   Days:    Peripheral IV 07/12/21 1553 Right Antecubital   07/12/21    1553    Antecubital   less than 1    Peripheral IV 07/12/21 2300 Left Antecubital   07/12/21    2300    Antecubital   less than 1                Hematology:  Results from last 7 days   Lab Units 07/12/21  1552   WBC 10*3/mm3 10.86*   HEMOGLOBIN g/dL 16.8   HEMATOCRIT % 56.9*   PLATELETS 10*3/mm3 219     Electrolytes, Magnesium and Phosphorus:  Results from last 7 days   Lab Units 07/13/21  0355 07/12/21 1955 07/12/21  1552   SODIUM mmol/L 138  --  136   CHLORIDE mmol/L 96*  --  97*   POTASSIUM mmol/L 4.3  4.2 4.2 4.6   CO2 mmol/L 26.0  --  33.0*   MAGNESIUM mg/dL 1.7 2.0  --    PHOSPHORUS mg/dL 2.9  --   --      Renal:  Results from last 7 days   Lab Units 07/13/21  0355 07/12/21  1552   CREATININE mg/dL 0.87 0.72*   BUN mg/dL 9 10     Estimated Creatinine Clearance: 176.2 mL/min (by C-G formula based on SCr of 0.87 mg/dL).  Hepatic:  Results from last 7 days   Lab Units 07/12/21  1552   ALK PHOS U/L 131*   BILIRUBIN mg/dL 1.5*   ALT (SGPT) U/L 73*   AST (SGOT) U/L 34     Arterial Blood Gases:  Results from last 7 days   Lab Units 07/12/21 1940 07/12/21  1724 07/12/21  1605   PH, ARTERIAL pH units 7.340* 7.280* 7.325*   PCO2, ARTERIAL mm Hg 69.6* 77.9* 68.9*   PO2 ART mm Hg 54.9* 83.7 61.3*   FIO2 % 100 90 40             Lab Results   Component Value Date    LACTATE 1.1 07/12/2021       Relevant imaging studies and labs from 07/13/21 were reviewed and  interpreted by me    Medications (drips):  bumetanide, Last Rate: 1 mg/hr (07/13/21 1111)        amLODIPine, 10 mg, Oral, Q24H  enoxaparin, 30 mg, Subcutaneous, Q12H  ipratropium-albuterol, 3 mL, Nebulization, Q4H - RT  pantoprazole, 40 mg, Intravenous, Q AM  piperacillin-tazobactam, 4.5 g, Intravenous, Q8H  sodium chloride, 10 mL, Intravenous, Q12H        Assessment/Plan   IMPRESSION / PLAN     Inpatient Problem List:  45 y.o.male:  Active Hospital Problems    Diagnosis    • Acute on chronic respiratory failure with hypoxia and hypercapnia (CMS/HCC)    • Acute pulmonary edema (CMS/HCC)    • Hypertensive emergency    • Obstructive sleep apnea    • Obesity hypoventilation syndrome (CMS/HCC) -suspected    • Acute respiratory failure with hypoxia and hypercarbia (CMS/HCC)    • Class 3 severe obesity due to excess calories with serious comorbidity and body mass index (BMI) of 40.0 to 44.9 in adult (CMS/HCC)         Impression:  45 y.o.male with relevant PMH of BMI 48, likely LEILANI / OHS, HTN, recently ran out of medication admitted 7/12/2021 with shortness of breath, LE edema, and uncontrolled HTN.  Found to have /125 and Severe Acute Hypoxemic & Hypercapnic Respiratory Failure requiring 100% Bipap.  CTA neg for PE, but did show bibasilar atelectasis / pneumonia, thickened LV myocardium, and enlarged PA.  No recent vomiting but has had severe reflux.    Overall appears to be a combination of Acute Diastolic HF and Bibasilar PNA / Atelectasis.  Suspect some degree of chronic Hypercapnic Respiratory Failure as pH nearly normal with PCO2 70 and BE +8.1.  Echo w/ EF 51-55%, mod to severe concentric hypertrophy, mild RV dilation, normal RVSP.  Patient Mother has h/o HOCM.    Plan:  Severe Acute Hypoxemic Respiratory Failure  AoC Hypercapnic Respiratory Failure  LEILANI  OHS  Acute Diastolic HF  Hypertensive Emergency  Bi-basilar Pneumonia / Atelectasis    Change Bipap to CPAP.  Wean to HFNC as able.  Short course of Bumex  gtt ~6 hours to see if this improves PaO2 / Fio2.  Empiric pip-tazo for pneumonia.  Duoneb w/ metaneb for pulmonary toilet.  Respiratory culture.  Empiric reflux treatment w/ PPI.  D/c vanco if MRSA PCR negative.  Tight BP control.  NTG gtt if needed to keep SBP < 140.  D/c norvasc.  Coreg for DHF, mod to severe concentric LVH.  Family h/o HOCM.  Hold ARB in setting of aggressive diuresis / contrast / IV vanco.  Will ask Cardiology to evaluate.  Despite severe Hypoxemia appears comfortable on CPAP.  If oxygenation worsens will require intubation.    DVT / PUD prophylaxis    NPO until respiratory status improves    Nutrition - NPO Diet    Plan of care and goals reviewed with multidisciplinary team at daily rounds    Critical Condition / High Risk secondary to severe hypoxemic / hypercapnic respiratory failure, Acute DHF, Hypertensive Emergency, very high risk for decompensation / mechanical ventilation / etc.    Critical Care time spent in direct patient care: 45 minutes (excluding procedure time, if applicable) including high complexity decision making to assess, manipulate, and support vital organ system failure in this individual who has impairment of one or more vital organ systems such that there is a high probability of imminent or life threatening deterioration in the patient’s condition.       Tato Whitaker MD  Intensive Care Medicine  07/13/21 12:50 EDT

## 2021-07-13 NOTE — CONSULTS
[facility] Cardiology         Date of Hospital Visit: 21      Place of Service: [facility]    Patient Name: Norberto Keita  :1975    Referral Provider: Intensivist  Primary Care Provider: Cam Cheung MD      Chief complaint/Reason for Consultation:  Diastolic CHF and Accelerated Hypertension    Problem List:  Active Hospital Problems    Diagnosis    • **Acute on chronic respiratory failure with hypoxia and hypercapnia (CMS/HCC)    • Acute on chronic combined systolic and diastolic CHF (congestive heart failure) (CMS/HCC)      · Echo 21: Left ventricular wall thickness is consistent with moderate to severe concentric hypertrophy. Left ventricular ejection fraction appears to be 51 - 55%.   • Hypertensive emergency    • Hypertension, essential    • Class 3 severe obesity due to excess calories with serious comorbidity and body mass index (BMI) of 40.0 to 44.9 in adult (CMS/HCC)    • Acute pulmonary edema (CMS/HCC)    • Obstructive sleep apnea    • Obesity hypoventilation syndrome (CMS/HCC) -suspected      History of Present Illness:  This is a 45-year-old hypertensive obese male with no significant prior cardiac history.  Over the past 1 to 2 years he has had increasing difficulty controlling his blood pressure.  Up until 2 years ago he was only on lisinopril and well managed.  He had a stress test 4 to 5 years ago at [facility] which was reported to be unremarkable.  He presented to the emergency department yesterday with accelerated hypertension and a diastolic reading over 120.  He was admitted to the intensivist service for acute on chronic respiratory failure with a diastolic heart failure component as well as obese hypoventilation.  He has known obstructive sleep apnea and is not currently on CPAP.  He has had no recent exertional chest pain or dyspnea, denies orthopnea, PND, claudication.  He has had chronic lower extremity edema for about a year  coincidentally he has also been on amlodipine for about 1 year.  His amlodipine was recently decreased from 10 to 5 mg daily and a diuretic added.  He ran out of diuretic and went back to taking 10 mg of amlodipine daily.  He has had no awareness of tachyarrhythmias, he denies dizziness or syncope.    Past Surgical History:   Procedure Laterality Date   • HYDROCELE EXCISION / REPAIR       Surgery Tunica Vaginalis Excision of Hydrocele   • KNEE SURGERY Left    • VASECTOMY         Allergies   Allergen Reactions   • Lisinopril Cough       Current Outpatient Medications   Medication Instructions   • amLODIPine (NORVASC) 5 mg, Oral, Daily   • atenolol (TENORMIN) 100 mg, Oral, Daily   • atorvastatin (LIPITOR) 40 mg, Oral, Nightly   • colchicine 0.6 mg, Oral, Daily   • hydroCHLOROthiazide (HYDRODIURIL) 25 mg, Oral, Daily   • indomethacin (INDOCIN) 50 MG capsule TAKE 1 CAPSULE BY MOUTH 3 TIMES A DAY AS NEEDED (ACUTE GOUT ATTACK)   • losartan (COZAAR) 100 mg, Oral, Daily   • omeprazole (PRILOSEC) 40 mg, Oral, Daily   • tadalafil (Cialis) 20 MG tablet 1/2-1 PO as needed prior to activity          Scheduled Meds:  carvedilol, 6.25 mg, Oral, Q12H  enoxaparin, 30 mg, Subcutaneous, Q12H  ipratropium-albuterol, 3 mL, Nebulization, Q4H - RT  pantoprazole, 40 mg, Intravenous, Q AM  pharmacy consult - [place], , Does not apply, Daily  piperacillin-tazobactam, 4.5 g, Intravenous, Q8H  sodium chloride, 10 mL, Intravenous, Q12H      Continuous Infusions:  bumetanide, 1 mg/hr, Last Rate: 1 mg/hr (07/13/21 1111)  nitroglycerin, 5-200 mcg/min, off            Social History     Socioeconomic History   • Marital status:      Spouse name: Not on file   • Number of children: Not on file   • Years of education: Not on file   • Highest education level: Not on file   Tobacco Use   • Smoking status: Never Smoker   • Smokeless tobacco: Never Used   Substance and Sexual Activity   • Alcohol use: Yes     Comment: 3-4 times weekly   • Drug use: No  "  • Sexual activity: Defer       Family History   Problem Relation Age of Onset   • Diabetes Mother    • Hyperlipidemia Mother    • Hypertension Mother    • Coronary artery disease Father    • Hyperlipidemia Father    • Hypertension Father    • Heart disease Father    • Heart failure Father    • Deep vein thrombosis Father        REVIEW OF SYSTEMS:   Review of Systems   Constitutional: Positive for malaise/fatigue.   HENT: Negative.    Eyes: Negative.    Cardiovascular: Positive for leg swelling.   Respiratory: Negative.    Endocrine: Negative.    Hematologic/Lymphatic: Negative.    Skin: Negative.    Musculoskeletal: Negative.    Gastrointestinal: Negative.    Genitourinary: Negative.    Neurological: Negative.    Psychiatric/Behavioral: Negative.    Allergic/Immunologic: Negative.    All other systems reviewed and are negative.    Objective:  Vitals:    07/13/21 1300 07/13/21 1325 07/13/21 1330 07/13/21 1400   BP: 121/91 121/91 128/89 132/89   BP Location:       Patient Position:       Pulse: 78 78 80 76   Resp: 24   20   Temp:   98.3 °F (36.8 °C)    TempSrc:   Oral    SpO2: 90%  (!) 88% 91%   Weight:       Height:         Body mass index is 48.52 kg/m².  Flowsheet Rows      First Filed Value   Admission Height  188 cm (74\") Documented at 07/12/2021 1530   Admission Weight  (!) 161 kg (355 lb) Documented at 07/12/2021 1530          Intake/Output Summary (Last 24 hours) at 7/13/2021 1434  Last data filed at 7/13/2021 1400  Gross per 24 hour   Intake 931.1 ml   Output 5350 ml   Net -4418.9 ml       Vitals reviewed.   Constitutional:       Appearance: Healthy appearance. Well-developed and not in distress.   Eyes:      General: No scleral icterus.     Conjunctiva/sclera: Conjunctivae normal.      Pupils: Pupils are equal, round, and reactive to light.   HENT:      Head: Normocephalic and atraumatic.   Neck:      Thyroid: No thyromegaly.      Vascular: No carotid bruit or JVD.      Lymphadenopathy: No cervical " adenopathy.   Pulmonary:      Effort: Pulmonary effort is normal.      Breath sounds: Normal breath sounds and air entry. No stridor. No wheezing. No rales.   Cardiovascular:      PMI at left midclavicular line. Normal rate. Regular rhythm. Normal S1. Normal S2.      Murmurs: There is no murmur.      No gallop. No click. No rub.   Pulses:     Intact distal pulses.   Edema:     Peripheral edema present.     Pretibial: bilateral 1+ edema of the pretibial area.     Ankle: bilateral 1+ edema of the ankle.     Feet: bilateral 1+ edema of the feet.  Abdominal:      General: Bowel sounds are normal. There is no distension.      Palpations: Abdomen is soft. There is no abdominal mass.      Tenderness: There is no abdominal tenderness.   Musculoskeletal: Normal range of motion.         General: No tenderness or deformity.      Cervical back: Neck supple. Skin:     General: Skin is warm and dry.      Findings: No erythema or rash.   Neurological:      Mental Status: Alert, oriented to person, place, and time and oriented to person, place and time.      Cranial Nerves: Cranial nerves are intact. No cranial nerve deficit.      Motor: Motor function is intact.      Coordination: Coordination normal.   Psychiatric:         Mood and Affect: Mood and affect normal.       Lab Review:                CBC:      Lab 07/12/21  1552   WBC 10.86*   HEMOGLOBIN 16.8   HEMATOCRIT 56.9*   PLATELETS 219   NEUTROS ABS 8.29*   IMMATURE GRANS (ABS) 0.26*   LYMPHS ABS 1.54   MONOS ABS 0.69   EOS ABS 0.00   MCV 90.9     CMP:        Lab 07/13/21  0355 07/12/21  1955 07/12/21  1552   SODIUM 138  --  136   POTASSIUM 4.3  4.2 4.2 4.6   CHLORIDE 96*  --  97*   CO2 26.0  --  33.0*   ANION GAP 16.0*  --  6.0   BUN 9  --  10   CREATININE 0.87  --  0.72*   GLUCOSE 117*  --  106*   CALCIUM 8.7  --  9.0   MAGNESIUM 1.7 2.0  --    PHOSPHORUS 2.9  --   --    TOTAL PROTEIN  --   --  7.3   ALBUMIN  --   --  4.10   GLOBULIN  --   --  3.2   ALT (SGPT)  --   --  73*    AST (SGOT)  --   --  34   BILIRUBIN  --   --  1.5*   ALK PHOS  --   --  131*     Results from last 7 days   Lab Units 07/13/21  0355   MAGNESIUM mg/dL 1.7     Lab Results   Component Value Date    HGBA1C 6.1 06/02/2021     Estimated Creatinine Clearance: 176.2 mL/min (by C-G formula based on SCr of 0.87 mg/dL).  Lab Results   Component Value Date    DDIMER 0.23 06/02/2021           CARDIAC LABS:      Lab 07/12/21  1552   PROBNP 845.1*   TROPONIN T 0.019       Lab Results   Component Value Date    CHLPL 130 06/02/2021    TRIG 114 06/02/2021    HDL 36 (L) 06/02/2021    LDL 73 06/02/2021           EKG:         Result Review:  I have personally reviewed the results from the time of admission and agree with these findings:  [x]  Laboratory  [x]  Radiology  [x]  EKG/Telemetry   []  Pathology  [x]  Old records  []  Other:    Assessment and Plan:   1.  Acute diastolic congestive heart failure presenting with acute hypoxemic respiratory failure.  Improved after good diuresis.   2.  Acute hypertensive urgency.  3.  Chronic hypertension which has been labile.  4.  Dyslipidemia, has been on statin therapy.  5.  Moderate obesity..  6.  Obstructive sleep apne    Recommendations:  1. Agree with diuresis, closely monitor renal function and electrolytes.  Goal is euvolemic status and improvement of his respiratory failure with minimal to no oxygen requirement.  2. Discontinue amlodipine to allow optimal medical therapy for management of hypertension and hypertensive heart disease/diastolic congestive heart failure.  3. Agree with discontinuation of Tenormin and initiation of carvedilol at current dose, blood pressure is currently stable, will continue to monitor and optimize medical therapy per standard guidelines.  After diuresis and if renal function remains stable we will consider adding losartan again.  4. Eventual ischemic evaluation when he has improved clinically.  5. Importance of lifestyle modification specially diet,  exercise, weight loss and restriction of excessive alcohol consumption was discussed.  6. Thank for this consultation, we will follow.     Scribed for Myra Montano MD by Electronically signed by MIREILLE Aiken, 07/13/21, 2:40 PM EDT.    I, Myra Montano MD,  personally performed the services described in this documentation as scribed by the above named individual in my presence, and it is both accurate and complete.  7/13/2021  15:26 EDT

## 2021-07-14 ENCOUNTER — APPOINTMENT (OUTPATIENT)
Dept: ULTRASOUND IMAGING | Facility: HOSPITAL | Age: 46
End: 2021-07-14

## 2021-07-14 ENCOUNTER — APPOINTMENT (OUTPATIENT)
Dept: GENERAL RADIOLOGY | Facility: HOSPITAL | Age: 46
End: 2021-07-14

## 2021-07-14 ENCOUNTER — APPOINTMENT (OUTPATIENT)
Dept: CARDIOLOGY | Facility: HOSPITAL | Age: 46
End: 2021-07-14

## 2021-07-14 LAB
ALBUMIN SERPL-MCNC: 3.7 G/DL (ref 3.5–5.2)
ALBUMIN/GLOB SERPL: 1.2 G/DL
ALP SERPL-CCNC: 123 U/L (ref 39–117)
ALT SERPL W P-5'-P-CCNC: 43 U/L (ref 1–41)
ANION GAP SERPL CALCULATED.3IONS-SCNC: 13 MMOL/L (ref 5–15)
ARTERIAL PATENCY WRIST A: ABNORMAL
AST SERPL-CCNC: 21 U/L (ref 1–40)
ATMOSPHERIC PRESS: ABNORMAL MM[HG]
BASE EXCESS BLDA CALC-SCNC: 14.8 MMOL/L (ref 0–2)
BASOPHILS # BLD AUTO: 0.06 10*3/MM3 (ref 0–0.2)
BASOPHILS NFR BLD AUTO: 0.6 % (ref 0–1.5)
BDY SITE: ABNORMAL
BILIRUB SERPL-MCNC: 2.9 MG/DL (ref 0–1.2)
BODY TEMPERATURE: 37 C
BUN SERPL-MCNC: 13 MG/DL (ref 6–20)
BUN/CREAT SERPL: 13 (ref 7–25)
CALCIUM SPEC-SCNC: 9 MG/DL (ref 8.6–10.5)
CHLORIDE SERPL-SCNC: 91 MMOL/L (ref 98–107)
CO2 BLDA-SCNC: 43.2 MMOL/L (ref 22–33)
CO2 SERPL-SCNC: 33 MMOL/L (ref 22–29)
COHGB MFR BLD: 1.4 % (ref 0–2)
CREAT SERPL-MCNC: 1 MG/DL (ref 0.76–1.27)
DEPRECATED RDW RBC AUTO: 46.6 FL (ref 37–54)
EOSINOPHIL # BLD AUTO: 0.09 10*3/MM3 (ref 0–0.4)
EOSINOPHIL NFR BLD AUTO: 0.8 % (ref 0.3–6.2)
EPAP: 0
ERYTHROCYTE [DISTWIDTH] IN BLOOD BY AUTOMATED COUNT: 16.8 % (ref 12.3–15.4)
GFR SERPL CREATININE-BSD FRML MDRD: 81 ML/MIN/1.73
GLOBULIN UR ELPH-MCNC: 3 GM/DL
GLUCOSE SERPL-MCNC: 105 MG/DL (ref 65–99)
HAV IGM SERPL QL IA: NORMAL
HBV CORE IGM SERPL QL IA: NORMAL
HBV SURFACE AG SERPL QL IA: NORMAL
HCO3 BLDA-SCNC: 41.5 MMOL/L (ref 20–26)
HCT VFR BLD AUTO: 55.5 % (ref 37.5–51)
HCT VFR BLD CALC: 53.1 %
HCV AB SER DONR QL: NORMAL
HGB BLD-MCNC: 16.9 G/DL (ref 13–17.7)
HGB BLDA-MCNC: 17.3 G/DL (ref 13.5–17.5)
IMM GRANULOCYTES # BLD AUTO: 0.11 10*3/MM3 (ref 0–0.05)
IMM GRANULOCYTES NFR BLD AUTO: 1 % (ref 0–0.5)
INHALED O2 CONCENTRATION: 100 %
IPAP: 0
LYMPHOCYTES # BLD AUTO: 1.4 10*3/MM3 (ref 0.7–3.1)
LYMPHOCYTES NFR BLD AUTO: 13.2 % (ref 19.6–45.3)
MAGNESIUM SERPL-MCNC: 2 MG/DL (ref 1.6–2.6)
MCH RBC QN AUTO: 26.4 PG (ref 26.6–33)
MCHC RBC AUTO-ENTMCNC: 30.5 G/DL (ref 31.5–35.7)
MCV RBC AUTO: 86.9 FL (ref 79–97)
METHGB BLD QL: -0.1 % (ref 0–1.5)
MODALITY: ABNORMAL
MONOCYTES # BLD AUTO: 0.92 10*3/MM3 (ref 0.1–0.9)
MONOCYTES NFR BLD AUTO: 8.7 % (ref 5–12)
NEUTROPHILS NFR BLD AUTO: 75.7 % (ref 42.7–76)
NEUTROPHILS NFR BLD AUTO: 8.02 10*3/MM3 (ref 1.7–7)
NOTE: ABNORMAL
NRBC BLD AUTO-RTO: 0 /100 WBC (ref 0–0.2)
OXYHGB MFR BLDV: 90.9 % (ref 94–99)
PAW @ PEAK INSP FLOW SETTING VENT: 0 CMH2O
PCO2 BLDA: 54.9 MM HG (ref 35–45)
PCO2 TEMP ADJ BLD: 54.9 MM HG (ref 35–48)
PH BLDA: 7.49 PH UNITS (ref 7.35–7.45)
PH, TEMP CORRECTED: 7.49 PH UNITS
PHOSPHATE SERPL-MCNC: 3 MG/DL (ref 2.5–4.5)
PLATELET # BLD AUTO: 212 10*3/MM3 (ref 140–450)
PMV BLD AUTO: 11.2 FL (ref 6–12)
PO2 BLDA: 62.9 MM HG (ref 83–108)
PO2 TEMP ADJ BLD: 62.9 MM HG (ref 83–108)
POTASSIUM SERPL-SCNC: 3.2 MMOL/L (ref 3.5–5.2)
POTASSIUM SERPL-SCNC: 3.8 MMOL/L (ref 3.5–5.2)
PROCALCITONIN SERPL-MCNC: 0.09 NG/ML (ref 0–0.25)
PROT SERPL-MCNC: 6.7 G/DL (ref 6–8.5)
RBC # BLD AUTO: 6.39 10*6/MM3 (ref 4.14–5.8)
SODIUM SERPL-SCNC: 137 MMOL/L (ref 136–145)
TOTAL RATE: 0 BREATHS/MINUTE
TSH SERPL DL<=0.05 MIU/L-ACNC: 3.23 UIU/ML (ref 0.27–4.2)
WBC # BLD AUTO: 10.6 10*3/MM3 (ref 3.4–10.8)

## 2021-07-14 PROCEDURE — 82805 BLOOD GASES W/O2 SATURATION: CPT

## 2021-07-14 PROCEDURE — 94799 UNLISTED PULMONARY SVC/PX: CPT

## 2021-07-14 PROCEDURE — 80053 COMPREHEN METABOLIC PANEL: CPT | Performed by: INTERNAL MEDICINE

## 2021-07-14 PROCEDURE — 83735 ASSAY OF MAGNESIUM: CPT | Performed by: NURSE PRACTITIONER

## 2021-07-14 PROCEDURE — 25010000002 ENOXAPARIN PER 10 MG: Performed by: INTERNAL MEDICINE

## 2021-07-14 PROCEDURE — 84100 ASSAY OF PHOSPHORUS: CPT | Performed by: INTERNAL MEDICINE

## 2021-07-14 PROCEDURE — 25010000002 PIPERACILLIN SOD-TAZOBACTAM PER 1 G: Performed by: INTERNAL MEDICINE

## 2021-07-14 PROCEDURE — 83050 HGB METHEMOGLOBIN QUAN: CPT

## 2021-07-14 PROCEDURE — 76705 ECHO EXAM OF ABDOMEN: CPT

## 2021-07-14 PROCEDURE — 94669 MECHANICAL CHEST WALL OSCILL: CPT

## 2021-07-14 PROCEDURE — 99232 SBSQ HOSP IP/OBS MODERATE 35: CPT | Performed by: INTERNAL MEDICINE

## 2021-07-14 PROCEDURE — 84443 ASSAY THYROID STIM HORMONE: CPT | Performed by: INTERNAL MEDICINE

## 2021-07-14 PROCEDURE — 71045 X-RAY EXAM CHEST 1 VIEW: CPT

## 2021-07-14 PROCEDURE — 93308 TTE F-UP OR LMTD: CPT

## 2021-07-14 PROCEDURE — 93308 TTE F-UP OR LMTD: CPT | Performed by: INTERNAL MEDICINE

## 2021-07-14 PROCEDURE — 84132 ASSAY OF SERUM POTASSIUM: CPT | Performed by: INTERNAL MEDICINE

## 2021-07-14 PROCEDURE — 25010000003 POTASSIUM CHLORIDE 10 MEQ/100ML SOLUTION: Performed by: NURSE PRACTITIONER

## 2021-07-14 PROCEDURE — 85025 COMPLETE CBC W/AUTO DIFF WBC: CPT | Performed by: INTERNAL MEDICINE

## 2021-07-14 PROCEDURE — 99291 CRITICAL CARE FIRST HOUR: CPT | Performed by: INTERNAL MEDICINE

## 2021-07-14 PROCEDURE — 82375 ASSAY CARBOXYHB QUANT: CPT

## 2021-07-14 PROCEDURE — 36600 WITHDRAWAL OF ARTERIAL BLOOD: CPT

## 2021-07-14 PROCEDURE — 94660 CPAP INITIATION&MGMT: CPT

## 2021-07-14 PROCEDURE — 84145 PROCALCITONIN (PCT): CPT | Performed by: INTERNAL MEDICINE

## 2021-07-14 RX ORDER — PANTOPRAZOLE SODIUM 40 MG/1
40 TABLET, DELAYED RELEASE ORAL
Status: DISCONTINUED | OUTPATIENT
Start: 2021-07-15 | End: 2021-07-22 | Stop reason: HOSPADM

## 2021-07-14 RX ORDER — COLCHICINE 0.6 MG/1
0.6 TABLET ORAL 2 TIMES DAILY
Status: DISCONTINUED | OUTPATIENT
Start: 2021-07-14 | End: 2021-07-14

## 2021-07-14 RX ORDER — HYDROCODONE BITARTRATE AND ACETAMINOPHEN 5; 325 MG/1; MG/1
1 TABLET ORAL EVERY 6 HOURS PRN
Status: DISCONTINUED | OUTPATIENT
Start: 2021-07-14 | End: 2021-07-22 | Stop reason: HOSPADM

## 2021-07-14 RX ORDER — CARVEDILOL 12.5 MG/1
12.5 TABLET ORAL EVERY 12 HOURS SCHEDULED
Status: DISCONTINUED | OUTPATIENT
Start: 2021-07-14 | End: 2021-07-22 | Stop reason: HOSPADM

## 2021-07-14 RX ORDER — AMOXICILLIN 250 MG
2 CAPSULE ORAL 2 TIMES DAILY PRN
Status: DISCONTINUED | OUTPATIENT
Start: 2021-07-14 | End: 2021-07-22 | Stop reason: HOSPADM

## 2021-07-14 RX ORDER — COLCHICINE 0.6 MG/1
1.2 TABLET ORAL 2 TIMES DAILY
Status: DISCONTINUED | OUTPATIENT
Start: 2021-07-14 | End: 2021-07-20

## 2021-07-14 RX ORDER — POTASSIUM CHLORIDE 750 MG/1
40 CAPSULE, EXTENDED RELEASE ORAL 2 TIMES DAILY WITH MEALS
Status: COMPLETED | OUTPATIENT
Start: 2021-07-14 | End: 2021-07-14

## 2021-07-14 RX ORDER — ONDANSETRON 2 MG/ML
4 INJECTION INTRAMUSCULAR; INTRAVENOUS EVERY 6 HOURS PRN
Status: DISCONTINUED | OUTPATIENT
Start: 2021-07-14 | End: 2021-07-22 | Stop reason: HOSPADM

## 2021-07-14 RX ORDER — BUMETANIDE 0.25 MG/ML
2 INJECTION INTRAMUSCULAR; INTRAVENOUS
Status: DISCONTINUED | OUTPATIENT
Start: 2021-07-14 | End: 2021-07-16

## 2021-07-14 RX ORDER — ALLOPURINOL 100 MG/1
100 TABLET ORAL DAILY
Status: DISCONTINUED | OUTPATIENT
Start: 2021-07-14 | End: 2021-07-22 | Stop reason: HOSPADM

## 2021-07-14 RX ORDER — ONDANSETRON 4 MG/1
4 TABLET, FILM COATED ORAL EVERY 6 HOURS PRN
Status: DISCONTINUED | OUTPATIENT
Start: 2021-07-14 | End: 2021-07-22 | Stop reason: HOSPADM

## 2021-07-14 RX ORDER — CHOLECALCIFEROL (VITAMIN D3) 125 MCG
10 CAPSULE ORAL NIGHTLY PRN
Status: DISCONTINUED | OUTPATIENT
Start: 2021-07-14 | End: 2021-07-22 | Stop reason: HOSPADM

## 2021-07-14 RX ADMIN — Medication 10 MG: at 22:29

## 2021-07-14 RX ADMIN — SODIUM CHLORIDE, PRESERVATIVE FREE 10 ML: 5 INJECTION INTRAVENOUS at 08:47

## 2021-07-14 RX ADMIN — IPRATROPIUM BROMIDE AND ALBUTEROL SULFATE 3 ML: 2.5; .5 SOLUTION RESPIRATORY (INHALATION) at 07:30

## 2021-07-14 RX ADMIN — TAZOBACTAM SODIUM AND PIPERACILLIN SODIUM 4.5 G: 500; 4 INJECTION, SOLUTION INTRAVENOUS at 13:57

## 2021-07-14 RX ADMIN — CARVEDILOL 12.5 MG: 12.5 TABLET, FILM COATED ORAL at 20:55

## 2021-07-14 RX ADMIN — POTASSIUM CHLORIDE 40 MEQ: 750 CAPSULE, EXTENDED RELEASE ORAL at 10:47

## 2021-07-14 RX ADMIN — ACETAMINOPHEN 650 MG: 325 TABLET ORAL at 08:56

## 2021-07-14 RX ADMIN — IPRATROPIUM BROMIDE AND ALBUTEROL SULFATE 3 ML: 2.5; .5 SOLUTION RESPIRATORY (INHALATION) at 13:24

## 2021-07-14 RX ADMIN — COLCHICINE 1.2 MG: 0.6 TABLET, FILM COATED ORAL at 21:44

## 2021-07-14 RX ADMIN — IPRATROPIUM BROMIDE AND ALBUTEROL SULFATE 3 ML: 2.5; .5 SOLUTION RESPIRATORY (INHALATION) at 23:06

## 2021-07-14 RX ADMIN — POTASSIUM CHLORIDE 10 MEQ: 7.46 INJECTION, SOLUTION INTRAVENOUS at 06:22

## 2021-07-14 RX ADMIN — ALLOPURINOL 100 MG: 100 TABLET ORAL at 18:28

## 2021-07-14 RX ADMIN — HYDROCODONE BITARTRATE AND ACETAMINOPHEN 1 TABLET: 5; 325 TABLET ORAL at 20:55

## 2021-07-14 RX ADMIN — ENOXAPARIN SODIUM 30 MG: 30 INJECTION SUBCUTANEOUS at 20:57

## 2021-07-14 RX ADMIN — PANTOPRAZOLE SODIUM 40 MG: 40 INJECTION, POWDER, FOR SOLUTION INTRAVENOUS at 05:17

## 2021-07-14 RX ADMIN — BUMETANIDE 2 MG: 0.25 INJECTION, SOLUTION INTRAMUSCULAR; INTRAVENOUS at 08:46

## 2021-07-14 RX ADMIN — ENOXAPARIN SODIUM 30 MG: 30 INJECTION SUBCUTANEOUS at 08:46

## 2021-07-14 RX ADMIN — HYDROCODONE BITARTRATE AND ACETAMINOPHEN 1 TABLET: 5; 325 TABLET ORAL at 14:08

## 2021-07-14 RX ADMIN — POTASSIUM CHLORIDE 10 MEQ: 7.46 INJECTION, SOLUTION INTRAVENOUS at 08:57

## 2021-07-14 RX ADMIN — CARVEDILOL 12.5 MG: 12.5 TABLET, FILM COATED ORAL at 08:46

## 2021-07-14 RX ADMIN — ACETAMINOPHEN 650 MG: 325 TABLET ORAL at 02:18

## 2021-07-14 RX ADMIN — IPRATROPIUM BROMIDE AND ALBUTEROL SULFATE 3 ML: 2.5; .5 SOLUTION RESPIRATORY (INHALATION) at 19:13

## 2021-07-14 RX ADMIN — COLCHICINE 0.6 MG: 0.6 TABLET, FILM COATED ORAL at 10:47

## 2021-07-14 RX ADMIN — POTASSIUM CHLORIDE 10 MEQ: 7.46 INJECTION, SOLUTION INTRAVENOUS at 05:18

## 2021-07-14 RX ADMIN — BUMETANIDE 2 MG: 0.25 INJECTION, SOLUTION INTRAMUSCULAR; INTRAVENOUS at 18:28

## 2021-07-14 RX ADMIN — POTASSIUM CHLORIDE 40 MEQ: 750 CAPSULE, EXTENDED RELEASE ORAL at 18:37

## 2021-07-14 RX ADMIN — TAZOBACTAM SODIUM AND PIPERACILLIN SODIUM 4.5 G: 500; 4 INJECTION, SOLUTION INTRAVENOUS at 05:18

## 2021-07-14 RX ADMIN — TAZOBACTAM SODIUM AND PIPERACILLIN SODIUM 4.5 G: 500; 4 INJECTION, SOLUTION INTRAVENOUS at 21:02

## 2021-07-14 RX ADMIN — IPRATROPIUM BROMIDE AND ALBUTEROL SULFATE 3 ML: 2.5; .5 SOLUTION RESPIRATORY (INHALATION) at 02:30

## 2021-07-14 NOTE — PROGRESS NOTES
"[facility] at [facility]  IP Progress Note      Chief Complaint: Follow-up of diastolic CHF/hypertensive heart disease    Subjective   Sitting up in chair, did not sleep well last night.  Denies chest pain, breathing has improved.  Remains on 100% oxygen.    Objective     Blood pressure 135/92, pulse 83, temperature 99.1 °F (37.3 °C), temperature source Axillary, resp. rate 22, height 185.4 cm (73\"), weight (!) 167 kg (367 lb 11.6 oz), SpO2 94 %.     Intake/Output Summary (Last 24 hours) at 7/14/2021 0655  Last data filed at 7/14/2021 0653  Gross per 24 hour   Intake 1291.2 ml   Output 5750 ml   Net -4458.8 ml       Physical Exam:  General: No acute distress.   Neck: no JVD.  Chest:No respiratory distress, breath sounds are diminished at bases with fine crackles.   Cardiovascular: Normal S1 and S2, distant heart sounds.  Extremities: 1+ edema.    Results Review:     I reviewed the patient's new clinical results.    Results from last 7 days   Lab Units 07/14/21  0330   WBC 10*3/mm3 10.60   HEMOGLOBIN g/dL 16.9   HEMATOCRIT % 55.5*   PLATELETS 10*3/mm3 212     Results from last 7 days   Lab Units 07/14/21  0330   SODIUM mmol/L 137   POTASSIUM mmol/L 3.2*   CHLORIDE mmol/L 91*   CO2 mmol/L 33.0*   BUN mg/dL 13   CREATININE mg/dL 1.00   CALCIUM mg/dL 9.0   BILIRUBIN mg/dL 2.9*   ALK PHOS U/L 123*   ALT (SGPT) U/L 43*   AST (SGOT) U/L 21   GLUCOSE mg/dL 105*     Results from last 7 days   Lab Units 07/14/21  0330   SODIUM mmol/L 137   POTASSIUM mmol/L 3.2*   CHLORIDE mmol/L 91*   CO2 mmol/L 33.0*   BUN mg/dL 13   CREATININE mg/dL 1.00   GLUCOSE mg/dL 105*   CALCIUM mg/dL 9.0         Lab Results   Component Value Date    TROPONINT 0.019 07/12/2021                   Tele: Sinus Rythym      Assessment:  1.  Acute diastolic congestive heart failure presenting with acute hypoxemic respiratory failure.  Normal EF by echocardiogram,improved clinically after good diuresis however remains on high flow " oxygen.    2.  Acute hypertensive urgency.    3.  Chronic hypertension which has been labile.  4.  Dyslipidemia, has been on statin therapy.  Currently held  5.  Moderate obesity..  6.  Obstructive sleep apnea.  7.  Abnormal LFTs with elevated total bilirubin.    Plan:  1. Continue Bumex 2 mg IV every 12 hours.  2. Supplement potassium, magnesium level is normal however he would benefit from additional supplementation.  3. Increase carvedilol to 12.5 mg twice daily.  4. Continue to monitor intake/output/electrolytes/renal function.  5. Goal of treatment is euvolemic status and optimal control of blood pressure.  6. Evaluation and management of medical issues including abnormal LFTs per ICU team.  7. Eventual ischemic evaluation.  8. Continue to hold statins until his liver function has completely normalized.    Myra Montano MD, FACC, [facility]

## 2021-07-14 NOTE — PLAN OF CARE
Goal Outcome Evaluation:  Plan of Care Reviewed With: patient        Progress: no change  Outcome Summary: Pt cont to require 100% 02 via Bipap or High Flow, avg SATs 88-90. Later in shift Pt transferred to chair, SATs improved to 92-93%.. BP stable, started Coreg. UOP 1100, no BM. K+ 3.2, replacement protocol. Pt c/o gout pain in Right elbow, PRN Tylenol given.

## 2021-07-14 NOTE — PROGRESS NOTES
INTENSIVIST / PULMONARY FOLLOW UP NOTE     Hospital:  LOS: 2 days   Mr. Norberto Keita, 45 y.o. male is followed for:     Acute on chronic respiratory failure with hypoxia and hypercapnia (CMS/HCC)    Hypertension, essential    Class 3 severe obesity due to excess calories with serious comorbidity and body mass index (BMI) of 40.0 to 44.9 in adult (CMS/HCC)    Acute pulmonary edema (CMS/HCC)    Hypertensive emergency    Obstructive sleep apnea    Obesity hypoventilation syndrome (CMS/HCC) -suspected    Acute on chronic combined systolic and diastolic CHF (congestive heart failure) (CMS/HCC)    Mixed hyperlipidemia    Prediabetes          SUBJECTIVE   Patient remains on 100% FiO2 on HFNC this morning    The patient's relevant past medical, surgical, family, and social history were reviewed    Allergies and medications were reviewed    ROS:  Per subjective, all other systems were reviewed and were negative        OBJECTIVE     Vital Sign Min/Max for last 24 hours:  Temp  Min: 97.9 °F (36.6 °C)  Max: 99.7 °F (37.6 °C)   BP  Min: 108/72  Max: 162/121   Pulse  Min: 77  Max: 96   Resp  Min: 18  Max: 24   SpO2  Min: 86 %  Max: 94 %   No data recorded     Physical Exam:  General Appearance:  Conversant, in no distress  Eyes:  No scleral icterus or pallor, pupils normal  Ears, Nose, Mouth, Throat:  Atraumatic, oropharynx clear  Neck:  Trachea midline, thyroid normal  Respiratory: diminished to auscultation bilaterally, normal effort, no tenderness to palpation  Cardiovascular:  Regular rate and rhythm, no murmurs, 1+ peripheral edema, no thrill  Gastrointestinal:  Soft, nontender, nondistended, no hepatosplenomegaly  Skin:  Normal temperature, no rash  Psychiatric:  Alert and oriented x 3, normal judgement and insight  Neuro:  No new focal neurologic deficits observed  MSK:  Right elbow warm and tender    Telemetry:              Hemodynamics:   CVP:     PAP:     PAOP:     CO:     CI:     SVI:     SVR:       SpO2: 90 %  SpO2  Min: 86 %  Max: 94 %   Device:      Flow Rate:   No data recorded     Mechanical Ventilator Settings:                                         Intake/Ouptut 24 hrs (7:00AM - 6:59 AM)  Intake & Output (last 3 days)       07/11 0701 - 07/12 0700 07/12 0701 - 07/13 0700 07/13 0701 - 07/14 0700 07/14 0701 - 07/15 0700    P.O.   625 720    I.V. (mL/kg)  53.2 (0.3) 137.8 (0.8) 40 (0.2)    IV Piggyback  600.9 528.4 100    Total Intake(mL/kg)  654.1 (3.9) 1291.2 (7.7) 860 (5.1)    Urine (mL/kg/hr)  2900 5750 (1.4) 2600 (2)    Total Output  2900 5750 2600    Net  -2245.9 -4458.8 -1740                  Lines, Drains & Airways    Active LDAs     Name:   Placement date:   Placement time:   Site:   Days:    Peripheral IV 07/12/21 1553 Right Antecubital   07/12/21    1553    Antecubital   less than 1    Peripheral IV 07/12/21 2300 Left Antecubital   07/12/21    2300    Antecubital   less than 1                Hematology:  Results from last 7 days   Lab Units 07/14/21  0330 07/12/21  1552   WBC 10*3/mm3 10.60 10.86*   HEMOGLOBIN g/dL 16.9 16.8   HEMATOCRIT % 55.5* 56.9*   PLATELETS 10*3/mm3 212 219     Electrolytes, Magnesium and Phosphorus:  Results from last 7 days   Lab Units 07/14/21  0330 07/13/21  1655 07/13/21  0355 07/12/21  1955 07/12/21  1552   SODIUM mmol/L 137 136 138  --  136   CHLORIDE mmol/L 91* 92* 96*  --  97*   POTASSIUM mmol/L 3.2* 3.8 4.3  4.2 4.2 4.6   CO2 mmol/L 33.0* 33.0* 26.0  --  33.0*   MAGNESIUM mg/dL 2.0 2.0 1.7 2.0  --    PHOSPHORUS mg/dL 3.0 2.7 2.9  --   --      Renal:  Results from last 7 days   Lab Units 07/14/21  0330 07/13/21  1655 07/13/21  0355 07/12/21  1552   CREATININE mg/dL 1.00 0.91 0.87 0.72*   BUN mg/dL 13 10 9 10     Estimated Creatinine Clearance: 153.3 mL/min (by C-G formula based on SCr of 1 mg/dL).  Hepatic:  Results from last 7 days   Lab Units 07/14/21  0330 07/12/21  1552   ALK PHOS U/L 123* 131*   BILIRUBIN mg/dL 2.9* 1.5*   ALT (SGPT) U/L 43* 73*   AST (SGOT) U/L 21 34      Arterial Blood Gases:  Results from last 7 days   Lab Units 07/14/21  0307 07/12/21  1940 07/12/21  1724 07/12/21  1605   PH, ARTERIAL pH units 7.487* 7.340* 7.280* 7.325*   PCO2, ARTERIAL mm Hg 54.9* 69.6* 77.9* 68.9*   PO2 ART mm Hg 62.9* 54.9* 83.7 61.3*   FIO2 % 100 100 90 40             Lab Results   Component Value Date    LACTATE 1.1 07/12/2021       Relevant imaging studies and labs from 07/14/21 were reviewed and interpreted by me    Medications (drips):  nitroglycerin        bumetanide, 2 mg, Intravenous, BID  carvedilol, 12.5 mg, Oral, Q12H  colchicine, 0.6 mg, Oral, BID  enoxaparin, 30 mg, Subcutaneous, Q12H  ipratropium-albuterol, 3 mL, Nebulization, Q4H - RT  [START ON 7/15/2021] pantoprazole, 40 mg, Oral, Q AM  pharmacy consult - MTM, , Does not apply, Daily  piperacillin-tazobactam, 4.5 g, Intravenous, Q8H  potassium chloride, 40 mEq, Oral, BID With Meals  sodium chloride, 10 mL, Intravenous, Q12H        Assessment/Plan   IMPRESSION / PLAN     Inpatient Problem List:  45 y.o.male:  Active Hospital Problems    Diagnosis    • **Acute on chronic respiratory failure with hypoxia and hypercapnia (CMS/HCC)    • Acute on chronic combined systolic and diastolic CHF (congestive heart failure) (CMS/HCC)      · Echo 7-12-21: Left ventricular wall thickness is consistent with moderate to severe concentric hypertrophy. Left ventricular ejection fraction appears to be 51 - 55%.     • Mixed hyperlipidemia    • Prediabetes    • Acute pulmonary edema (CMS/HCC)    • Hypertensive emergency    • Obstructive sleep apnea    • Obesity hypoventilation syndrome (CMS/HCC) -suspected    • Class 3 severe obesity due to excess calories with serious comorbidity and body mass index (BMI) of 40.0 to 44.9 in adult (CMS/HCC)    • Hypertension, essential         Impression:  45 y.o.male with relevant PMH of BMI 48, likely LEILANI / OHS, HTN, recently ran out of medication admitted 7/12/2021 with shortness of breath, LE edema, and  uncontrolled HTN.  Found to have /125 and Severe Acute Hypoxemic & Hypercapnic Respiratory Failure requiring 100% Bipap.  CTA neg for PE, but did show bibasilar atelectasis / pneumonia, thickened LV myocardium, and enlarged PA.  No recent vomiting but has had severe reflux.    Overall appears to be a combination of Acute Diastolic HF and Bibasilar PNA / Atelectasis.  Suspect some degree of chronic Hypercapnic Respiratory Failure as pH nearly normal with PCO2 70 and BE +8.1.  Echo w/ EF 51-55%, mod to severe concentric hypertrophy, mild RV dilation, normal RVSP.  Patient Mother has h/o HOCM.    Plan:  Severe Acute Hypoxemic Respiratory Failure  AoC Hypercapnic Respiratory Failure  LEILANI  OHS  Acute Diastolic HF  Hypertensive Emergency  Bi-basilar Pneumonia / Atelectasis  Gout Flare  Elevated LFTs    Did not tolerate Bipap very well over night.  Keep on HFNC - wean FiO2 as able.  Continue diuresis.  Empiric pip-tazo for pneumonia.  Duoneb w/ metaneb for pulmonary toilet.  Follow up respiratory culture.  Empiric reflux treatment w/ PPI.  D/c'd vanco - MRSA PCR negative.  Tight BP control.  NTG gtt if needed to keep SBP < 140.  D/cd norvasc.  Coreg for DHF, mod to severe concentric LVH.  Family h/o HOCM.  Hold ARB in setting of aggressive diuresis / contrast / IV vanco.      Limited Echo w/ Bubble study to r/o intracardiac shunt given degree of hypoxemia.    Replace electrolytes.     Continue colchicine.  Start allopurinol.    Check RUQ ultrasound.  Check acute hepatitis panel.    Appreciate Cardiology help.    DVT / PUD prophylaxis    Nutrition - Diet Regular; Cardiac    Plan of care and goals reviewed with multidisciplinary team at daily rounds    Critical Condition / High Risk secondary to severe hypoxemic / hypercapnic respiratory failure, Acute DHF, Hypertensive Emergency, very high risk for decompensation / mechanical ventilation / etc.    Critical Care time spent in direct patient care: 30 minutes (excluding  procedure time, if applicable) including high complexity decision making to assess, manipulate, and support vital organ system failure in this individual who has impairment of one or more vital organ systems such that there is a high probability of imminent or life threatening deterioration in the patient’s condition.       Tato Whitaker MD  Intensive Care Medicine  07/14/21 14:38 EDT

## 2021-07-15 ENCOUNTER — APPOINTMENT (OUTPATIENT)
Dept: CARDIOLOGY | Facility: HOSPITAL | Age: 46
End: 2021-07-15

## 2021-07-15 ENCOUNTER — TELEMEDICINE (OUTPATIENT)
Dept: SLEEP MEDICINE | Facility: HOSPITAL | Age: 46
End: 2021-07-15

## 2021-07-15 DIAGNOSIS — E66.01 CLASS 3 SEVERE OBESITY DUE TO EXCESS CALORIES WITH SERIOUS COMORBIDITY AND BODY MASS INDEX (BMI) OF 40.0 TO 44.9 IN ADULT (HCC): ICD-10-CM

## 2021-07-15 DIAGNOSIS — G47.33 OBSTRUCTIVE SLEEP APNEA, ADULT: ICD-10-CM

## 2021-07-15 DIAGNOSIS — R06.83 SNORING: Primary | ICD-10-CM

## 2021-07-15 DIAGNOSIS — J96.21 ACUTE ON CHRONIC RESPIRATORY FAILURE WITH HYPOXIA AND HYPERCAPNIA (HCC): ICD-10-CM

## 2021-07-15 DIAGNOSIS — J96.22 ACUTE ON CHRONIC RESPIRATORY FAILURE WITH HYPOXIA AND HYPERCAPNIA (HCC): ICD-10-CM

## 2021-07-15 LAB
ALBUMIN SERPL-MCNC: 3.6 G/DL (ref 3.5–5.2)
ALBUMIN/GLOB SERPL: 1.1 G/DL
ALP SERPL-CCNC: 110 U/L (ref 39–117)
ALT SERPL W P-5'-P-CCNC: 28 U/L (ref 1–41)
ANION GAP SERPL CALCULATED.3IONS-SCNC: 10 MMOL/L (ref 5–15)
AST SERPL-CCNC: 15 U/L (ref 1–40)
BACTERIA SPEC RESP CULT: NORMAL
BASOPHILS # BLD AUTO: 0.05 10*3/MM3 (ref 0–0.2)
BASOPHILS NFR BLD AUTO: 0.5 % (ref 0–1.5)
BH CV ECHO MEAS - BSA(HAYCOCK): 3 M^2
BH CV ECHO MEAS - BSA: 2.8 M^2
BH CV ECHO MEAS - BZI_BMI: 48.4 KILOGRAMS/M^2
BH CV ECHO MEAS - BZI_METRIC_HEIGHT: 185.4 CM
BH CV ECHO MEAS - BZI_METRIC_WEIGHT: 166.5 KG
BH CV VAS BP LEFT ARM: NORMAL MMHG
BILIRUB SERPL-MCNC: 2.9 MG/DL (ref 0–1.2)
BUN SERPL-MCNC: 15 MG/DL (ref 6–20)
BUN/CREAT SERPL: 17 (ref 7–25)
CALCIUM SPEC-SCNC: 9.2 MG/DL (ref 8.6–10.5)
CHLORIDE SERPL-SCNC: 93 MMOL/L (ref 98–107)
CO2 SERPL-SCNC: 33 MMOL/L (ref 22–29)
CREAT SERPL-MCNC: 0.88 MG/DL (ref 0.76–1.27)
CRP SERPL-MCNC: 10.33 MG/DL (ref 0–0.5)
D-LACTATE SERPL-SCNC: 1 MMOL/L (ref 0.5–2)
DEPRECATED RDW RBC AUTO: 48.2 FL (ref 37–54)
EOSINOPHIL # BLD AUTO: 0.09 10*3/MM3 (ref 0–0.4)
EOSINOPHIL NFR BLD AUTO: 0.9 % (ref 0.3–6.2)
ERYTHROCYTE [DISTWIDTH] IN BLOOD BY AUTOMATED COUNT: 17.1 % (ref 12.3–15.4)
ERYTHROCYTE [SEDIMENTATION RATE] IN BLOOD: 47 MM/HR (ref 0–15)
GFR SERPL CREATININE-BSD FRML MDRD: 94 ML/MIN/1.73
GLOBULIN UR ELPH-MCNC: 3.3 GM/DL
GLUCOSE SERPL-MCNC: 118 MG/DL (ref 65–99)
GRAM STN SPEC: NORMAL
HCT VFR BLD AUTO: 56.9 % (ref 37.5–51)
HGB BLD-MCNC: 17.2 G/DL (ref 13–17.7)
IMM GRANULOCYTES # BLD AUTO: 0.06 10*3/MM3 (ref 0–0.05)
IMM GRANULOCYTES NFR BLD AUTO: 0.6 % (ref 0–0.5)
LYMPHOCYTES # BLD AUTO: 1.21 10*3/MM3 (ref 0.7–3.1)
LYMPHOCYTES NFR BLD AUTO: 12.7 % (ref 19.6–45.3)
MAGNESIUM SERPL-MCNC: 2 MG/DL (ref 1.6–2.6)
MAXIMAL PREDICTED HEART RATE: 176 BPM
MCH RBC QN AUTO: 26.3 PG (ref 26.6–33)
MCHC RBC AUTO-ENTMCNC: 30.2 G/DL (ref 31.5–35.7)
MCV RBC AUTO: 86.9 FL (ref 79–97)
MONOCYTES # BLD AUTO: 1.03 10*3/MM3 (ref 0.1–0.9)
MONOCYTES NFR BLD AUTO: 10.8 % (ref 5–12)
NEUTROPHILS NFR BLD AUTO: 7.12 10*3/MM3 (ref 1.7–7)
NEUTROPHILS NFR BLD AUTO: 74.5 % (ref 42.7–76)
NRBC BLD AUTO-RTO: 0 /100 WBC (ref 0–0.2)
PHOSPHATE SERPL-MCNC: 3.9 MG/DL (ref 2.5–4.5)
PLATELET # BLD AUTO: 205 10*3/MM3 (ref 140–450)
PMV BLD AUTO: 10.9 FL (ref 6–12)
POTASSIUM SERPL-SCNC: 3.7 MMOL/L (ref 3.5–5.2)
PROCALCITONIN SERPL-MCNC: 0.11 NG/ML (ref 0–0.25)
PROT SERPL-MCNC: 6.9 G/DL (ref 6–8.5)
RBC # BLD AUTO: 6.55 10*6/MM3 (ref 4.14–5.8)
SODIUM SERPL-SCNC: 136 MMOL/L (ref 136–145)
STRESS TARGET HR: 150 BPM
WBC # BLD AUTO: 9.56 10*3/MM3 (ref 3.4–10.8)

## 2021-07-15 PROCEDURE — 93308 TTE F-UP OR LMTD: CPT

## 2021-07-15 PROCEDURE — 99291 CRITICAL CARE FIRST HOUR: CPT | Performed by: INTERNAL MEDICINE

## 2021-07-15 PROCEDURE — 25010000002 PIPERACILLIN SOD-TAZOBACTAM PER 1 G: Performed by: INTERNAL MEDICINE

## 2021-07-15 PROCEDURE — 84145 PROCALCITONIN (PCT): CPT | Performed by: INTERNAL MEDICINE

## 2021-07-15 PROCEDURE — 25010000002 METHYLPREDNISOLONE PER 40 MG: Performed by: INTERNAL MEDICINE

## 2021-07-15 PROCEDURE — 94660 CPAP INITIATION&MGMT: CPT

## 2021-07-15 PROCEDURE — 94799 UNLISTED PULMONARY SVC/PX: CPT

## 2021-07-15 PROCEDURE — 86140 C-REACTIVE PROTEIN: CPT | Performed by: INTERNAL MEDICINE

## 2021-07-15 PROCEDURE — 94669 MECHANICAL CHEST WALL OSCILL: CPT

## 2021-07-15 PROCEDURE — 99232 SBSQ HOSP IP/OBS MODERATE 35: CPT | Performed by: INTERNAL MEDICINE

## 2021-07-15 PROCEDURE — 85652 RBC SED RATE AUTOMATED: CPT | Performed by: INTERNAL MEDICINE

## 2021-07-15 PROCEDURE — 25010000002 ENOXAPARIN PER 10 MG: Performed by: INTERNAL MEDICINE

## 2021-07-15 PROCEDURE — 84100 ASSAY OF PHOSPHORUS: CPT | Performed by: INTERNAL MEDICINE

## 2021-07-15 PROCEDURE — 83735 ASSAY OF MAGNESIUM: CPT | Performed by: NURSE PRACTITIONER

## 2021-07-15 PROCEDURE — 83605 ASSAY OF LACTIC ACID: CPT | Performed by: INTERNAL MEDICINE

## 2021-07-15 PROCEDURE — 85025 COMPLETE CBC W/AUTO DIFF WBC: CPT | Performed by: INTERNAL MEDICINE

## 2021-07-15 PROCEDURE — 80053 COMPREHEN METABOLIC PANEL: CPT | Performed by: INTERNAL MEDICINE

## 2021-07-15 RX ORDER — METHYLPREDNISOLONE SODIUM SUCCINATE 40 MG/ML
20 INJECTION, POWDER, LYOPHILIZED, FOR SOLUTION INTRAMUSCULAR; INTRAVENOUS EVERY 8 HOURS SCHEDULED
Status: DISCONTINUED | OUTPATIENT
Start: 2021-07-15 | End: 2021-07-16

## 2021-07-15 RX ORDER — POTASSIUM CHLORIDE 750 MG/1
60 CAPSULE, EXTENDED RELEASE ORAL ONCE
Status: COMPLETED | OUTPATIENT
Start: 2021-07-15 | End: 2021-07-15

## 2021-07-15 RX ORDER — L.ACID,PARA/B.BIFIDUM/S.THERM 8B CELL
1 CAPSULE ORAL DAILY
Status: DISCONTINUED | OUTPATIENT
Start: 2021-07-15 | End: 2021-07-22 | Stop reason: HOSPADM

## 2021-07-15 RX ORDER — ASPIRIN 81 MG/1
81 TABLET, CHEWABLE ORAL DAILY
Status: DISCONTINUED | OUTPATIENT
Start: 2021-07-15 | End: 2021-07-22 | Stop reason: HOSPADM

## 2021-07-15 RX ORDER — POTASSIUM CHLORIDE 750 MG/1
40 CAPSULE, EXTENDED RELEASE ORAL ONCE
Status: COMPLETED | OUTPATIENT
Start: 2021-07-15 | End: 2021-07-15

## 2021-07-15 RX ADMIN — COLCHICINE 1.2 MG: 0.6 TABLET, FILM COATED ORAL at 08:03

## 2021-07-15 RX ADMIN — TAZOBACTAM SODIUM AND PIPERACILLIN SODIUM 4.5 G: 500; 4 INJECTION, SOLUTION INTRAVENOUS at 13:29

## 2021-07-15 RX ADMIN — ASPIRIN 81 MG CHEWABLE TABLET 81 MG: 81 TABLET CHEWABLE at 16:02

## 2021-07-15 RX ADMIN — IPRATROPIUM BROMIDE AND ALBUTEROL SULFATE 3 ML: 2.5; .5 SOLUTION RESPIRATORY (INHALATION) at 15:10

## 2021-07-15 RX ADMIN — Medication 1 CAPSULE: at 11:03

## 2021-07-15 RX ADMIN — TAZOBACTAM SODIUM AND PIPERACILLIN SODIUM 4.5 G: 500; 4 INJECTION, SOLUTION INTRAVENOUS at 20:18

## 2021-07-15 RX ADMIN — COLCHICINE 1.2 MG: 0.6 TABLET, FILM COATED ORAL at 20:18

## 2021-07-15 RX ADMIN — IPRATROPIUM BROMIDE AND ALBUTEROL SULFATE 3 ML: 2.5; .5 SOLUTION RESPIRATORY (INHALATION) at 07:42

## 2021-07-15 RX ADMIN — BUMETANIDE 2 MG: 0.25 INJECTION, SOLUTION INTRAMUSCULAR; INTRAVENOUS at 17:10

## 2021-07-15 RX ADMIN — SODIUM CHLORIDE, PRESERVATIVE FREE 10 ML: 5 INJECTION INTRAVENOUS at 08:04

## 2021-07-15 RX ADMIN — PANTOPRAZOLE SODIUM 40 MG: 40 TABLET, DELAYED RELEASE ORAL at 05:26

## 2021-07-15 RX ADMIN — BUMETANIDE 2 MG: 0.25 INJECTION, SOLUTION INTRAMUSCULAR; INTRAVENOUS at 08:17

## 2021-07-15 RX ADMIN — HYDROCODONE BITARTRATE AND ACETAMINOPHEN 1 TABLET: 5; 325 TABLET ORAL at 05:26

## 2021-07-15 RX ADMIN — POTASSIUM CHLORIDE 60 MEQ: 750 CAPSULE, EXTENDED RELEASE ORAL at 11:02

## 2021-07-15 RX ADMIN — HYDROCODONE BITARTRATE AND ACETAMINOPHEN 1 TABLET: 5; 325 TABLET ORAL at 17:10

## 2021-07-15 RX ADMIN — ENOXAPARIN SODIUM 30 MG: 30 INJECTION SUBCUTANEOUS at 20:20

## 2021-07-15 RX ADMIN — Medication 10 MG: at 20:19

## 2021-07-15 RX ADMIN — ENOXAPARIN SODIUM 30 MG: 30 INJECTION SUBCUTANEOUS at 08:03

## 2021-07-15 RX ADMIN — IPRATROPIUM BROMIDE AND ALBUTEROL SULFATE 3 ML: 2.5; .5 SOLUTION RESPIRATORY (INHALATION) at 20:01

## 2021-07-15 RX ADMIN — POTASSIUM CHLORIDE 40 MEQ: 750 CAPSULE, EXTENDED RELEASE ORAL at 16:26

## 2021-07-15 RX ADMIN — METHYLPREDNISOLONE SODIUM SUCCINATE 20 MG: 40 INJECTION, POWDER, FOR SOLUTION INTRAMUSCULAR; INTRAVENOUS at 11:03

## 2021-07-15 RX ADMIN — IPRATROPIUM BROMIDE AND ALBUTEROL SULFATE 3 ML: 2.5; .5 SOLUTION RESPIRATORY (INHALATION) at 23:19

## 2021-07-15 RX ADMIN — ALLOPURINOL 100 MG: 100 TABLET ORAL at 08:03

## 2021-07-15 RX ADMIN — IPRATROPIUM BROMIDE AND ALBUTEROL SULFATE 3 ML: 2.5; .5 SOLUTION RESPIRATORY (INHALATION) at 02:54

## 2021-07-15 RX ADMIN — CARVEDILOL 12.5 MG: 12.5 TABLET, FILM COATED ORAL at 20:18

## 2021-07-15 RX ADMIN — METHYLPREDNISOLONE SODIUM SUCCINATE 20 MG: 40 INJECTION, POWDER, FOR SOLUTION INTRAMUSCULAR; INTRAVENOUS at 20:20

## 2021-07-15 RX ADMIN — CARVEDILOL 12.5 MG: 12.5 TABLET, FILM COATED ORAL at 08:03

## 2021-07-15 RX ADMIN — HYDROCODONE BITARTRATE AND ACETAMINOPHEN 1 TABLET: 5; 325 TABLET ORAL at 11:06

## 2021-07-15 RX ADMIN — TAZOBACTAM SODIUM AND PIPERACILLIN SODIUM 4.5 G: 500; 4 INJECTION, SOLUTION INTRAVENOUS at 05:27

## 2021-07-15 RX ADMIN — SODIUM CHLORIDE, PRESERVATIVE FREE 10 ML: 5 INJECTION INTRAVENOUS at 20:20

## 2021-07-15 NOTE — PLAN OF CARE
Goal Outcome Evaluation:  Plan of Care Reviewed With: patient        Progress: no change  Outcome Summary: Pt cont to require 100% 02 via BiPap or High Flow, Briefly tolerated CPAP HS. SATs - 87-90% average. BP remains stable. UOP -1050 +. Large BM. Gout pain in R elbow, R Foot - Norco PRN and scheduled Allopurinol, Colchicine.

## 2021-07-15 NOTE — PROGRESS NOTES
INTENSIVIST / PULMONARY FOLLOW UP NOTE     Hospital:  LOS: 3 days   Mr. Norberto Keita, 45 y.o. male is followed for:     Acute on chronic respiratory failure with hypoxia and hypercapnia (CMS/HCC)    Hypertension, essential    Class 3 severe obesity due to excess calories with serious comorbidity and body mass index (BMI) of 40.0 to 44.9 in adult (CMS/HCC)    Acute pulmonary edema (CMS/HCC)    Hypertensive emergency    Obstructive sleep apnea    Obesity hypoventilation syndrome (CMS/HCC) -suspected    Acute on chronic combined systolic and diastolic CHF (congestive heart failure) (CMS/HCC)    Mixed hyperlipidemia    Prediabetes          SUBJECTIVE   Patient remains on 100% FiO2 on HFNC this morning    The patient's relevant past medical, surgical, family, and social history were reviewed    Allergies and medications were reviewed    ROS:  Per subjective, all other systems were reviewed and were negative        OBJECTIVE     Vital Sign Min/Max for last 24 hours:  Temp  Min: 97.9 °F (36.6 °C)  Max: 99.3 °F (37.4 °C)   BP  Min: 106/70  Max: 162/121   Pulse  Min: 82  Max: 96   Resp  Min: 16  Max: 22   SpO2  Min: 84 %  Max: 92 %   No data recorded     Physical Exam:  General Appearance:  Conversant, in no distress  Eyes:  No scleral icterus or pallor, pupils normal  Ears, Nose, Mouth, Throat:  Atraumatic, oropharynx clear  Neck:  Trachea midline, thyroid normal  Respiratory: diminished to auscultation bilaterally, normal effort, no tenderness to palpation  Cardiovascular:  Regular rate and rhythm, no murmurs, 1+ peripheral edema, no thrill  Gastrointestinal:  Soft, nontender, nondistended, no hepatosplenomegaly  Skin:  Normal temperature, no rash  Psychiatric:  Alert and oriented x 3, normal judgement and insight  Neuro:  No new focal neurologic deficits observed  MSK:  Right elbow warm and tender    Telemetry:              Hemodynamics:   CVP:     PAP:     PAOP:     CO:     CI:     SVI:     SVR:       SpO2: (!) 88 %  SpO2  Min: 84 %  Max: 92 %   Device:      Flow Rate:   No data recorded     Mechanical Ventilator Settings:                                         Intake/Ouptut 24 hrs (7:00AM - 6:59 AM)  Intake & Output (last 3 days)       07/12 0701 - 07/13 0700 07/13 0701 - 07/14 0700 07/14 0701 - 07/15 0700 07/15 0701 - 07/16 0700    P.O.  625 1200 480    I.V. (mL/kg) 53.2 (0.3) 137.8 (0.8) 102.9 (0.6)     IV Piggyback 600.9 528.4 413.1     Total Intake(mL/kg) 654.1 (3.9) 1291.2 (7.7) 1716 (10.3) 480 (2.9)    Urine (mL/kg/hr) 2900 5750 (1.4) 4200 (1) 850 (0.9)    Stool   0     Total Output 2900 5750 4200 850    Net -2245.9 -4458.8 -2484 -370            Urine Unmeasured Occurrence   1 x     Stool Unmeasured Occurrence   1 x           Lines, Drains & Airways    Active LDAs     Name:   Placement date:   Placement time:   Site:   Days:    Peripheral IV 07/12/21 1553 Right Antecubital   07/12/21    1553    Antecubital   less than 1    Peripheral IV 07/12/21 2300 Left Antecubital   07/12/21    2300    Antecubital   less than 1                Hematology:  Results from last 7 days   Lab Units 07/15/21  0602 07/14/21  0330 07/12/21  1552   WBC 10*3/mm3 9.56 10.60 10.86*   HEMOGLOBIN g/dL 17.2 16.9 16.8   HEMATOCRIT % 56.9* 55.5* 56.9*   PLATELETS 10*3/mm3 205 212 219     Electrolytes, Magnesium and Phosphorus:  Results from last 7 days   Lab Units 07/15/21  0603 07/14/21  1527 07/14/21  0330 07/13/21  1655 07/13/21  0355 07/12/21  1955 07/12/21  1552   SODIUM mmol/L 136  --  137 136 138  --  136   CHLORIDE mmol/L 93*  --  91* 92* 96*  --  97*   POTASSIUM mmol/L 3.7 3.8 3.2* 3.8 4.3  4.2 4.2 4.6   CO2 mmol/L 33.0*  --  33.0* 33.0* 26.0  --  33.0*   MAGNESIUM mg/dL 2.0  --  2.0 2.0 1.7 2.0  --    PHOSPHORUS mg/dL 3.9  --  3.0 2.7 2.9  --   --      Renal:  Results from last 7 days   Lab Units 07/15/21  0603 07/14/21  0330 07/13/21  1655 07/13/21  0355 07/12/21  1552   CREATININE mg/dL 0.88 1.00 0.91 0.87 0.72*   BUN mg/dL 15 13 10 9 10      Estimated Creatinine Clearance: 174.2 mL/min (by C-G formula based on SCr of 0.88 mg/dL).  Hepatic:  Results from last 7 days   Lab Units 07/15/21  0603 07/14/21  0330 07/12/21  1552   ALK PHOS U/L 110 123* 131*   BILIRUBIN mg/dL 2.9* 2.9* 1.5*   ALT (SGPT) U/L 28 43* 73*   AST (SGOT) U/L 15 21 34     Arterial Blood Gases:  Results from last 7 days   Lab Units 07/14/21  0307 07/12/21  1940 07/12/21  1724 07/12/21  1605   PH, ARTERIAL pH units 7.487* 7.340* 7.280* 7.325*   PCO2, ARTERIAL mm Hg 54.9* 69.6* 77.9* 68.9*   PO2 ART mm Hg 62.9* 54.9* 83.7 61.3*   FIO2 % 100 100 90 40             Lab Results   Component Value Date    LACTATE 1.0 07/15/2021       Relevant imaging studies and labs from 07/15/21 were reviewed and interpreted by me    Medications (drips):       allopurinol, 100 mg, Oral, Daily  bumetanide, 2 mg, Intravenous, BID  carvedilol, 12.5 mg, Oral, Q12H  colchicine, 1.2 mg, Oral, BID  enoxaparin, 30 mg, Subcutaneous, Q12H  ipratropium-albuterol, 3 mL, Nebulization, Q4H - RT  lactobacillus acidophilus, 1 capsule, Oral, Daily  methylPREDNISolone sodium succinate, 20 mg, Intravenous, Q8H  pantoprazole, 40 mg, Oral, Q AM  pharmacy consult - MTM, , Does not apply, Daily  piperacillin-tazobactam, 4.5 g, Intravenous, Q8H  potassium chloride, 40 mEq, Oral, Once  sodium chloride, 10 mL, Intravenous, Q12H        Assessment/Plan   IMPRESSION / PLAN     Inpatient Problem List:  45 y.o.male:  Active Hospital Problems    Diagnosis    • **Acute on chronic respiratory failure with hypoxia and hypercapnia (CMS/HCC)    • Acute on chronic combined systolic and diastolic CHF (congestive heart failure) (CMS/HCC)      · Echo 7-12-21: Left ventricular wall thickness is consistent with moderate to severe concentric hypertrophy. Left ventricular ejection fraction appears to be 51 - 55%.     • Mixed hyperlipidemia    • Prediabetes    • Acute pulmonary edema (CMS/HCC)    • Hypertensive emergency    • Obstructive sleep apnea     • Obesity hypoventilation syndrome (CMS/HCC) -suspected    • Class 3 severe obesity due to excess calories with serious comorbidity and body mass index (BMI) of 40.0 to 44.9 in adult (CMS/HCC)    • Hypertension, essential         Impression:  45 y.o.male with relevant PMH of BMI 48, likely LEILANI / OHS, HTN, recently ran out of medication admitted 7/12/2021 with shortness of breath, LE edema, and uncontrolled HTN.  Found to have /125 and Severe Acute Hypoxemic & Hypercapnic Respiratory Failure requiring 100% Bipap.  CTA neg for PE, but did show bibasilar atelectasis / pneumonia, thickened LV myocardium, and enlarged PA.  No recent vomiting but has had severe reflux.    Overall appears to be a combination of Acute Diastolic HF and Bibasilar PNA / Atelectasis.  Suspect some degree of chronic Hypercapnic Respiratory Failure as pH nearly normal with PCO2 70 and BE +8.1.  Echo w/ EF 51-55%, mod to severe concentric hypertrophy, mild RV dilation, normal RVSP.  Patient Mother has h/o HOCM.    Plan:  Severe Acute Hypoxemic Respiratory Failure  AoC Hypercapnic Respiratory Failure  LEILANI  OHS  Acute Diastolic HF  Hypertensive Emergency  Bi-basilar Pneumonia / Atelectasis  Gout Flare  Elevated LFTs    Bipap hs and prn.  Keep on HFNC - wean FiO2 as able.  Continue diuresis.  Empiric pip-tazo for pneumonia.  Duoneb w/ metaneb for pulmonary toilet. Empiric reflux treatment w/ PPI.  D/c'd vanco - MRSA PCR negative.  Tight BP control.  Coreg for DHF, mod to severe concentric LVH.  Family h/o HOCM.  Hold ARB in setting of aggressive diuresis / contrast / IV vanco.      If patient ends up having LHC would benefit from having RHC as well to exclude PAH.    Continue colchicine and allopurinol for gout flare.  Start IV solumedrol as well, which will hopefully improve his respiratory status.    Limited Echo w/ Bubble study to r/o intracardiac shunt given degree of hypoxemia getting repeat today as yesterdays study was  inadequate.    Replace electrolytes.     Appreciate Cardiology help.    DVT / PUD prophylaxis    Nutrition - Diet Regular; Cardiac     PT/OT    Plan of care and goals reviewed with multidisciplinary team at daily rounds    Critical Condition / High Risk secondary to severe hypoxemic / hypercapnic respiratory failure, Acute DHF, Hypertensive Emergency, very high risk for decompensation / mechanical ventilation / etc.    Critical Care time spent in direct patient care: 30 minutes (excluding procedure time, if applicable) including high complexity decision making to assess, manipulate, and support vital organ system failure in this individual who has impairment of one or more vital organ systems such that there is a high probability of imminent or life threatening deterioration in the patient’s condition.       Tato Whitaker MD  Intensive Care Medicine  07/15/21 12:46 EDT

## 2021-07-15 NOTE — PROGRESS NOTES
Clinical Nutrition     Reason for Visit: NICOLE KELLOGG    Patient Name: Norberto Keita  YOB: 1975  MRN: [id number]  Date of Encounter: 07/15/21 12:20 EDT  Admission date: 7/12/2021    Nutrition Assessment   Admission Problem List:  A/C CHF  A/CRF  Acute Pulmonary Edema  Suspected LEILANI  r/o PNA  Gout flare    PMH:   He  has a past medical history of Sacroiliitis (CMS/HCC).Obesity, Gout, HTN, HLP, Pre-Diabetes    PSxH:  He  has a past surgical history that includes Knee surgery (Left); Hydrocele excision / repair; and Vasectomy.      Reported/Observed/Food/Nutrition Related History:     Pt resting in bed, on HFNC, reports good appetite, family at bedside    Anthropometrics   Height: 73in  Weight: 367lb standing scale  BMI: 48  BMI classification: Obese Class III extreme obesity: > or equal to 40kg/m2     Labs reviewed     Results from last 7 days   Lab Units 07/15/21  0603   SODIUM mmol/L 136   POTASSIUM mmol/L 3.7   CHLORIDE mmol/L 93*   CO2 mmol/L 33.0*   BUN mg/dL 15   CREATININE mg/dL 0.88   CALCIUM mg/dL 9.2   BILIRUBIN mg/dL 2.9*   ALK PHOS U/L 110   ALT (SGPT) U/L 28   AST (SGOT) U/L 15   GLUCOSE mg/dL 118*           Lab Results   Lab Value Date/Time    HGBA1C 6.1 06/02/2021 0928    HGBA1C 6.50 (H) 04/30/2021 0947       Medications reviewed   Pertinent:abx, bumex, colchicine, allopurinol, steroids, protonix    Intake/Ouptut 24 hrs (7:00AM - 6:59 AM)     Intake & Output (last day)       07/14 0701 - 07/15 0700 07/15 0701 - 07/16 0700    P.O. 1200 480    I.V. (mL/kg) 102.9 (0.6)     IV Piggyback 413.1     Total Intake(mL/kg) 1716 (10.3) 480 (2.9)    Urine (mL/kg/hr) 4200 (1) 850 (1)    Stool 0     Total Output 4200 850    Net -2484 -370          Urine Unmeasured Occurrence 1 x     Stool Unmeasured Occurrence 1 x                GI: wnl    SKIN: sunburn    Current Nutrition Prescription     PO: Diet Regular; Cardiac    Intake: 88% of 4 meals    Nutrition Diagnosis     Problem No  nutrition diagnosis at this time   Etiology    Signs/Symptoms      Nutrition Intervention   1.  Follow treatment progress, Advise alternate selection, Advised available snacks, Interview for preferences, Menu provided      Goal:   General: Nutrition support treatment  PO: Maintain intake    Monitoring/Evaluation:   Per protocol    Gail Mar RD  Time Spent: 30min

## 2021-07-15 NOTE — CASE MANAGEMENT/SOCIAL WORK
Continued Stay Note  [facility]     Patient Name: Norberto Keita  MRN: [id number]  Today's Date: 7/15/2021    Admit Date: 7/12/2021    Discharge Plan     Row Name 07/15/21 1205       Plan    Plan  Home    Patient/Family in Agreement with Plan  yes    Plan Comments  Spoke with patient and wife at bedside.  Wife states they just completed a zoom appointment with Dr. Hardin.  Wife states that Dr. Hardin is going to have his office arrange for patient to have a BiPAP for when he is discharged.  Patient is currently on HFNC.  Patient and wife deny any other needs at this time.  CM will continue to follow.        Discharge Codes    No documentation.       Expected Discharge Date and Time     Expected Discharge Date Expected Discharge Time    Jul 20, 2021             Yasmin Georges RN

## 2021-07-15 NOTE — PLAN OF CARE
Goal Outcome Evaluation:  Plan of Care Reviewed With: patient, spouse        Progress: no change   Pt continues to require 100% FIO@ via Hi flow or BiPap. O2 saturation 86-91%. Pt denies SOA. Bumex IV given x 2- 2850 ml total urine output for shift. Tolerated Up in chair this afternoon. PT/OT starts in am. C/o right ankle/foot and right elbow gout pain- prn Norco given every 6 hours and scheduled Allopurinol, Colchicine and ice packs. 20 mg solumedrol q8 hours started. Good po intake.

## 2021-07-15 NOTE — PROGRESS NOTES
"[facility]  IP Progress Note      Chief Complaint: Follow-up of diastolic CHF/hypertensive heart disease.    Subjective   Stable, feels a bit better, continues to diurese.  Still on very high flow oxygen.  Denies any chest pain.    Objective     Blood pressure 133/81, pulse 88, temperature 99.3 °F (37.4 °C), temperature source Oral, resp. rate 18, height 185.4 cm (72.99\"), weight (!) 167 kg (368 lb 9.8 oz), SpO2 (!) 88 %.     Intake/Output Summary (Last 24 hours) at 7/15/2021 0756  Last data filed at 7/15/2021 0530  Gross per 24 hour   Intake 1716 ml   Output 4200 ml   Net -2484 ml       Physical Exam:  General: No acute distress.   Neck: no JVD.  Chest:No respiratory distress, breath sounds are diminished at bases with scattered rhonchi.   Cardiovascular: Normal S1 and S2, distant heart sounds..    Extremities: 1+ pretibial edema.    Results Review:     I reviewed the patient's new clinical results.    Results from last 7 days   Lab Units 07/15/21  0602   WBC 10*3/mm3 9.56   HEMOGLOBIN g/dL 17.2   HEMATOCRIT % 56.9*   PLATELETS 10*3/mm3 205     Results from last 7 days   Lab Units 07/14/21  1527 07/14/21  0330   SODIUM mmol/L  --  137   POTASSIUM mmol/L 3.8 3.2*   CHLORIDE mmol/L  --  91*   CO2 mmol/L  --  33.0*   BUN mg/dL  --  13   CREATININE mg/dL  --  1.00   CALCIUM mg/dL  --  9.0   BILIRUBIN mg/dL  --  2.9*   ALK PHOS U/L  --  123*   ALT (SGPT) U/L  --  43*   AST (SGOT) U/L  --  21   GLUCOSE mg/dL  --  105*     Results from last 7 days   Lab Units 07/14/21  1527 07/14/21  0330   SODIUM mmol/L  --  137   POTASSIUM mmol/L 3.8 3.2*   CHLORIDE mmol/L  --  91*   CO2 mmol/L  --  33.0*   BUN mg/dL  --  13   CREATININE mg/dL  --  1.00   GLUCOSE mg/dL  --  105*   CALCIUM mg/dL  --  9.0         Lab Results   Component Value Date    TROPONINT 0.019 07/12/2021     Results from last 7 days   Lab Units 07/14/21  0330   TSH uIU/mL 3.230               Tele: Sinus " [facility]      Assessment:  1. Acute diastolic congestive heart failure presenting with hypoxemic respiratory failure.  Normal EF by echocardiogram.  2. The degree of hypoxemia and need of high flow oxygen is out of proportion to the degree of current volume overload specially after significant diuresis.  He has polycythemia suggesting chronic hypoxemia.  3. Hypertension which has been previously labile, currently controlled.  4. Obstructive sleep apnea.  5. Moderate obesity.  6. Abnormal liver function tests with elevated bilirubin.    Plan:  1. Continue current dose of Bumex and carvedilol.  2. IV nitroglycerin as needed, titrate to adequate blood pressure control.  3. Echocardiogram with bubble study to rule out intracardiac shunt.  4. Add low-dose aspirin, continue current dose of Lovenox for DVT prophylaxis.  5. ICU team managing respiratory failure.    Myra Montano MD, FACC, [facility]

## 2021-07-16 ENCOUNTER — APPOINTMENT (OUTPATIENT)
Dept: GENERAL RADIOLOGY | Facility: HOSPITAL | Age: 46
End: 2021-07-16

## 2021-07-16 PROBLEM — I50.33 ACUTE ON CHRONIC DIASTOLIC CONGESTIVE HEART FAILURE (HCC): Status: ACTIVE | Noted: 2021-07-13

## 2021-07-16 LAB
ALBUMIN SERPL-MCNC: 3.8 G/DL (ref 3.5–5.2)
ALBUMIN/GLOB SERPL: 1 G/DL
ALP SERPL-CCNC: 124 U/L (ref 39–117)
ALT SERPL W P-5'-P-CCNC: 57 U/L (ref 1–41)
ANION GAP SERPL CALCULATED.3IONS-SCNC: 10 MMOL/L (ref 5–15)
AST SERPL-CCNC: 44 U/L (ref 1–40)
B PARAPERT DNA SPEC QL NAA+PROBE: NOT DETECTED
B PERT DNA SPEC QL NAA+PROBE: NOT DETECTED
BASOPHILS # BLD AUTO: 0.02 10*3/MM3 (ref 0–0.2)
BASOPHILS NFR BLD AUTO: 0.2 % (ref 0–1.5)
BILIRUB SERPL-MCNC: 1.8 MG/DL (ref 0–1.2)
BUN SERPL-MCNC: 20 MG/DL (ref 6–20)
BUN/CREAT SERPL: 24.4 (ref 7–25)
C PNEUM DNA NPH QL NAA+NON-PROBE: NOT DETECTED
CALCIUM SPEC-SCNC: 9.5 MG/DL (ref 8.6–10.5)
CHLORIDE SERPL-SCNC: 92 MMOL/L (ref 98–107)
CHROMATIN AB SERPL-ACNC: 11.3 IU/ML (ref 0–14)
CO2 SERPL-SCNC: 32 MMOL/L (ref 22–29)
CREAT SERPL-MCNC: 0.82 MG/DL (ref 0.76–1.27)
CRP SERPL-MCNC: 13.84 MG/DL (ref 0–0.5)
DEPRECATED RDW RBC AUTO: 46.7 FL (ref 37–54)
EOSINOPHIL # BLD AUTO: 0 10*3/MM3 (ref 0–0.4)
EOSINOPHIL NFR BLD AUTO: 0 % (ref 0.3–6.2)
ERYTHROCYTE [DISTWIDTH] IN BLOOD BY AUTOMATED COUNT: 15.4 % (ref 12.3–15.4)
ERYTHROCYTE [SEDIMENTATION RATE] IN BLOOD: 68 MM/HR (ref 0–15)
FLUAV SUBTYP SPEC NAA+PROBE: NOT DETECTED
FLUBV RNA ISLT QL NAA+PROBE: NOT DETECTED
GFR SERPL CREATININE-BSD FRML MDRD: 102 ML/MIN/1.73
GLOBULIN UR ELPH-MCNC: 3.8 GM/DL
GLUCOSE SERPL-MCNC: 147 MG/DL (ref 65–99)
HADV DNA SPEC NAA+PROBE: NOT DETECTED
HCOV 229E RNA SPEC QL NAA+PROBE: NOT DETECTED
HCOV HKU1 RNA SPEC QL NAA+PROBE: NOT DETECTED
HCOV NL63 RNA SPEC QL NAA+PROBE: NOT DETECTED
HCOV OC43 RNA SPEC QL NAA+PROBE: NOT DETECTED
HCT VFR BLD AUTO: 58.9 % (ref 37.5–51)
HGB BLD-MCNC: 17.3 G/DL (ref 13–17.7)
HMPV RNA NPH QL NAA+NON-PROBE: NOT DETECTED
HPIV1 RNA SPEC QL NAA+PROBE: NOT DETECTED
HPIV2 RNA SPEC QL NAA+PROBE: NOT DETECTED
HPIV3 RNA NPH QL NAA+PROBE: NOT DETECTED
HPIV4 P GENE NPH QL NAA+PROBE: NOT DETECTED
IMM GRANULOCYTES # BLD AUTO: 0.07 10*3/MM3 (ref 0–0.05)
IMM GRANULOCYTES NFR BLD AUTO: 0.6 % (ref 0–0.5)
LYMPHOCYTES # BLD AUTO: 0.45 10*3/MM3 (ref 0.7–3.1)
LYMPHOCYTES NFR BLD AUTO: 3.8 % (ref 19.6–45.3)
M PNEUMO IGG SER IA-ACNC: NOT DETECTED
MAGNESIUM SERPL-MCNC: 2.1 MG/DL (ref 1.6–2.6)
MCH RBC QN AUTO: 26 PG (ref 26.6–33)
MCHC RBC AUTO-ENTMCNC: 29.4 G/DL (ref 31.5–35.7)
MCV RBC AUTO: 88.4 FL (ref 79–97)
MONOCYTES # BLD AUTO: 0.75 10*3/MM3 (ref 0.1–0.9)
MONOCYTES NFR BLD AUTO: 6.4 % (ref 5–12)
NEUTROPHILS NFR BLD AUTO: 10.42 10*3/MM3 (ref 1.7–7)
NEUTROPHILS NFR BLD AUTO: 89 % (ref 42.7–76)
NRBC BLD AUTO-RTO: 0 /100 WBC (ref 0–0.2)
NT-PROBNP SERPL-MCNC: 55.8 PG/ML (ref 0–450)
PHOSPHATE SERPL-MCNC: 3.4 MG/DL (ref 2.5–4.5)
PLATELET # BLD AUTO: 250 10*3/MM3 (ref 140–450)
PMV BLD AUTO: 10.9 FL (ref 6–12)
POTASSIUM SERPL-SCNC: 4.3 MMOL/L (ref 3.5–5.2)
PROCALCITONIN SERPL-MCNC: 0.11 NG/ML (ref 0–0.25)
PROT SERPL-MCNC: 7.6 G/DL (ref 6–8.5)
RBC # BLD AUTO: 6.66 10*6/MM3 (ref 4.14–5.8)
RHINOVIRUS RNA SPEC NAA+PROBE: NOT DETECTED
RSV RNA NPH QL NAA+NON-PROBE: NOT DETECTED
SARS-COV-2 RNA NPH QL NAA+NON-PROBE: NOT DETECTED
SODIUM SERPL-SCNC: 134 MMOL/L (ref 136–145)
WBC # BLD AUTO: 11.71 10*3/MM3 (ref 3.4–10.8)

## 2021-07-16 PROCEDURE — 86618 LYME DISEASE ANTIBODY: CPT | Performed by: INTERNAL MEDICINE

## 2021-07-16 PROCEDURE — 86140 C-REACTIVE PROTEIN: CPT | Performed by: INTERNAL MEDICINE

## 2021-07-16 PROCEDURE — 87449 NOS EACH ORGANISM AG IA: CPT | Performed by: INTERNAL MEDICINE

## 2021-07-16 PROCEDURE — 86235 NUCLEAR ANTIGEN ANTIBODY: CPT | Performed by: INTERNAL MEDICINE

## 2021-07-16 PROCEDURE — 87385 HISTOPLASMA CAPSUL AG IA: CPT | Performed by: INTERNAL MEDICINE

## 2021-07-16 PROCEDURE — 97165 OT EVAL LOW COMPLEX 30 MIN: CPT

## 2021-07-16 PROCEDURE — 99291 CRITICAL CARE FIRST HOUR: CPT | Performed by: INTERNAL MEDICINE

## 2021-07-16 PROCEDURE — 83880 ASSAY OF NATRIURETIC PEPTIDE: CPT | Performed by: INTERNAL MEDICINE

## 2021-07-16 PROCEDURE — 84100 ASSAY OF PHOSPHORUS: CPT | Performed by: INTERNAL MEDICINE

## 2021-07-16 PROCEDURE — 97161 PT EVAL LOW COMPLEX 20 MIN: CPT

## 2021-07-16 PROCEDURE — 82787 IGG 1 2 3 OR 4 EACH: CPT | Performed by: INTERNAL MEDICINE

## 2021-07-16 PROCEDURE — 25010000002 ENOXAPARIN PER 10 MG: Performed by: INTERNAL MEDICINE

## 2021-07-16 PROCEDURE — 83735 ASSAY OF MAGNESIUM: CPT | Performed by: NURSE PRACTITIONER

## 2021-07-16 PROCEDURE — 86753 PROTOZOA ANTIBODY NOS: CPT | Performed by: INTERNAL MEDICINE

## 2021-07-16 PROCEDURE — 25010000002 METHYLPREDNISOLONE PER 40 MG: Performed by: INTERNAL MEDICINE

## 2021-07-16 PROCEDURE — 86666 EHRLICHIA ANTIBODY: CPT | Performed by: INTERNAL MEDICINE

## 2021-07-16 PROCEDURE — 83520 IMMUNOASSAY QUANT NOS NONAB: CPT | Performed by: INTERNAL MEDICINE

## 2021-07-16 PROCEDURE — 84145 PROCALCITONIN (PCT): CPT | Performed by: INTERNAL MEDICINE

## 2021-07-16 PROCEDURE — 86606 ASPERGILLUS ANTIBODY: CPT | Performed by: INTERNAL MEDICINE

## 2021-07-16 PROCEDURE — 85652 RBC SED RATE AUTOMATED: CPT | Performed by: INTERNAL MEDICINE

## 2021-07-16 PROCEDURE — 94799 UNLISTED PULMONARY SVC/PX: CPT

## 2021-07-16 PROCEDURE — 97110 THERAPEUTIC EXERCISES: CPT

## 2021-07-16 PROCEDURE — 71045 X-RAY EXAM CHEST 1 VIEW: CPT

## 2021-07-16 PROCEDURE — 94660 CPAP INITIATION&MGMT: CPT

## 2021-07-16 PROCEDURE — 86757 RICKETTSIA ANTIBODY: CPT | Performed by: INTERNAL MEDICINE

## 2021-07-16 PROCEDURE — 86256 FLUORESCENT ANTIBODY TITER: CPT | Performed by: INTERNAL MEDICINE

## 2021-07-16 PROCEDURE — 87103 BLOOD FUNGUS CULTURE: CPT | Performed by: INTERNAL MEDICINE

## 2021-07-16 PROCEDURE — 86200 CCP ANTIBODY: CPT | Performed by: INTERNAL MEDICINE

## 2021-07-16 PROCEDURE — 99232 SBSQ HOSP IP/OBS MODERATE 35: CPT | Performed by: INTERNAL MEDICINE

## 2021-07-16 PROCEDURE — 80053 COMPREHEN METABOLIC PANEL: CPT | Performed by: INTERNAL MEDICINE

## 2021-07-16 PROCEDURE — 25010000002 METHYLPREDNISOLONE PER 125 MG: Performed by: INTERNAL MEDICINE

## 2021-07-16 PROCEDURE — 86431 RHEUMATOID FACTOR QUANT: CPT | Performed by: INTERNAL MEDICINE

## 2021-07-16 PROCEDURE — 0202U NFCT DS 22 TRGT SARS-COV-2: CPT | Performed by: INTERNAL MEDICINE

## 2021-07-16 PROCEDURE — 82784 ASSAY IGA/IGD/IGG/IGM EACH: CPT | Performed by: INTERNAL MEDICINE

## 2021-07-16 PROCEDURE — 25010000002 PIPERACILLIN SOD-TAZOBACTAM PER 1 G: Performed by: INTERNAL MEDICINE

## 2021-07-16 PROCEDURE — 87899 AGENT NOS ASSAY W/OPTIC: CPT | Performed by: INTERNAL MEDICINE

## 2021-07-16 PROCEDURE — 86612 BLASTOMYCES ANTIBODY: CPT | Performed by: INTERNAL MEDICINE

## 2021-07-16 PROCEDURE — 85025 COMPLETE CBC W/AUTO DIFF WBC: CPT | Performed by: INTERNAL MEDICINE

## 2021-07-16 PROCEDURE — 94669 MECHANICAL CHEST WALL OSCILL: CPT

## 2021-07-16 PROCEDURE — 97116 GAIT TRAINING THERAPY: CPT

## 2021-07-16 PROCEDURE — 82785 ASSAY OF IGE: CPT | Performed by: INTERNAL MEDICINE

## 2021-07-16 PROCEDURE — 86225 DNA ANTIBODY NATIVE: CPT | Performed by: INTERNAL MEDICINE

## 2021-07-16 RX ORDER — POTASSIUM CHLORIDE 750 MG/1
40 CAPSULE, EXTENDED RELEASE ORAL 2 TIMES DAILY WITH MEALS
Status: COMPLETED | OUTPATIENT
Start: 2021-07-16 | End: 2021-07-16

## 2021-07-16 RX ORDER — FLUTICASONE PROPIONATE 50 MCG
2 SPRAY, SUSPENSION (ML) NASAL 2 TIMES DAILY
Status: DISCONTINUED | OUTPATIENT
Start: 2021-07-16 | End: 2021-07-22 | Stop reason: HOSPADM

## 2021-07-16 RX ORDER — POTASSIUM CHLORIDE 750 MG/1
20 CAPSULE, EXTENDED RELEASE ORAL 2 TIMES DAILY
Status: DISCONTINUED | OUTPATIENT
Start: 2021-07-16 | End: 2021-07-16

## 2021-07-16 RX ORDER — METHYLPREDNISOLONE SODIUM SUCCINATE 40 MG/ML
40 INJECTION, POWDER, LYOPHILIZED, FOR SOLUTION INTRAMUSCULAR; INTRAVENOUS EVERY 8 HOURS SCHEDULED
Status: DISCONTINUED | OUTPATIENT
Start: 2021-07-16 | End: 2021-07-19

## 2021-07-16 RX ORDER — ECHINACEA PURPUREA EXTRACT 125 MG
1 TABLET ORAL AS NEEDED
Status: DISCONTINUED | OUTPATIENT
Start: 2021-07-16 | End: 2021-07-22 | Stop reason: HOSPADM

## 2021-07-16 RX ORDER — OXYMETAZOLINE HYDROCHLORIDE 0.05 G/100ML
2 SPRAY NASAL 2 TIMES DAILY
Status: COMPLETED | OUTPATIENT
Start: 2021-07-16 | End: 2021-07-18

## 2021-07-16 RX ORDER — METHYLPREDNISOLONE SODIUM SUCCINATE 125 MG/2ML
125 INJECTION, POWDER, LYOPHILIZED, FOR SOLUTION INTRAMUSCULAR; INTRAVENOUS ONCE
Status: COMPLETED | OUTPATIENT
Start: 2021-07-16 | End: 2021-07-16

## 2021-07-16 RX ORDER — BUMETANIDE 0.25 MG/ML
2 INJECTION INTRAMUSCULAR; INTRAVENOUS EVERY 6 HOURS
Status: COMPLETED | OUTPATIENT
Start: 2021-07-16 | End: 2021-07-17

## 2021-07-16 RX ORDER — TEMAZEPAM 15 MG/1
15 CAPSULE ORAL NIGHTLY PRN
Status: DISCONTINUED | OUTPATIENT
Start: 2021-07-16 | End: 2021-07-22 | Stop reason: HOSPADM

## 2021-07-16 RX ADMIN — Medication 2 SPRAY: at 20:59

## 2021-07-16 RX ADMIN — TAZOBACTAM SODIUM AND PIPERACILLIN SODIUM 4.5 G: 500; 4 INJECTION, SOLUTION INTRAVENOUS at 14:55

## 2021-07-16 RX ADMIN — TEMAZEPAM 15 MG: 15 CAPSULE ORAL at 22:37

## 2021-07-16 RX ADMIN — METHYLPREDNISOLONE SODIUM SUCCINATE 40 MG: 40 INJECTION, POWDER, FOR SOLUTION INTRAMUSCULAR; INTRAVENOUS at 21:02

## 2021-07-16 RX ADMIN — METHYLPREDNISOLONE SODIUM SUCCINATE 20 MG: 40 INJECTION, POWDER, FOR SOLUTION INTRAMUSCULAR; INTRAVENOUS at 05:12

## 2021-07-16 RX ADMIN — CARVEDILOL 12.5 MG: 12.5 TABLET, FILM COATED ORAL at 20:59

## 2021-07-16 RX ADMIN — HYDROCODONE BITARTRATE AND ACETAMINOPHEN 1 TABLET: 5; 325 TABLET ORAL at 20:59

## 2021-07-16 RX ADMIN — TAZOBACTAM SODIUM AND PIPERACILLIN SODIUM 4.5 G: 500; 4 INJECTION, SOLUTION INTRAVENOUS at 21:02

## 2021-07-16 RX ADMIN — COLCHICINE 1.2 MG: 0.6 TABLET, FILM COATED ORAL at 08:39

## 2021-07-16 RX ADMIN — METHYLPREDNISOLONE SODIUM SUCCINATE 125 MG: 125 INJECTION, POWDER, FOR SOLUTION INTRAMUSCULAR; INTRAVENOUS at 09:27

## 2021-07-16 RX ADMIN — HYDROCODONE BITARTRATE AND ACETAMINOPHEN 1 TABLET: 5; 325 TABLET ORAL at 08:38

## 2021-07-16 RX ADMIN — ASPIRIN 81 MG CHEWABLE TABLET 81 MG: 81 TABLET CHEWABLE at 08:39

## 2021-07-16 RX ADMIN — CARVEDILOL 12.5 MG: 12.5 TABLET, FILM COATED ORAL at 08:39

## 2021-07-16 RX ADMIN — IPRATROPIUM BROMIDE AND ALBUTEROL SULFATE 3 ML: 2.5; .5 SOLUTION RESPIRATORY (INHALATION) at 07:54

## 2021-07-16 RX ADMIN — IPRATROPIUM BROMIDE AND ALBUTEROL SULFATE 3 ML: 2.5; .5 SOLUTION RESPIRATORY (INHALATION) at 20:47

## 2021-07-16 RX ADMIN — FLUTICASONE PROPIONATE 2 SPRAY: 50 SPRAY, METERED NASAL at 21:01

## 2021-07-16 RX ADMIN — BUMETANIDE 2 MG: 0.25 INJECTION, SOLUTION INTRAMUSCULAR; INTRAVENOUS at 23:19

## 2021-07-16 RX ADMIN — IPRATROPIUM BROMIDE AND ALBUTEROL SULFATE 3 ML: 2.5; .5 SOLUTION RESPIRATORY (INHALATION) at 15:30

## 2021-07-16 RX ADMIN — SODIUM CHLORIDE, PRESERVATIVE FREE 10 ML: 5 INJECTION INTRAVENOUS at 21:00

## 2021-07-16 RX ADMIN — ENOXAPARIN SODIUM 30 MG: 30 INJECTION SUBCUTANEOUS at 20:59

## 2021-07-16 RX ADMIN — Medication 2 SPRAY: at 12:53

## 2021-07-16 RX ADMIN — DOXYCYCLINE 100 MG: 100 INJECTION, POWDER, LYOPHILIZED, FOR SOLUTION INTRAVENOUS at 09:27

## 2021-07-16 RX ADMIN — PANTOPRAZOLE SODIUM 40 MG: 40 TABLET, DELAYED RELEASE ORAL at 05:12

## 2021-07-16 RX ADMIN — SALINE NASAL SPRAY 1 SPRAY: 1.5 SOLUTION NASAL at 11:03

## 2021-07-16 RX ADMIN — COLCHICINE 1.2 MG: 0.6 TABLET, FILM COATED ORAL at 20:59

## 2021-07-16 RX ADMIN — DOXYCYCLINE 100 MG: 100 INJECTION, POWDER, LYOPHILIZED, FOR SOLUTION INTRAVENOUS at 21:00

## 2021-07-16 RX ADMIN — ALLOPURINOL 100 MG: 100 TABLET ORAL at 08:39

## 2021-07-16 RX ADMIN — POTASSIUM CHLORIDE 40 MEQ: 750 CAPSULE, EXTENDED RELEASE ORAL at 09:44

## 2021-07-16 RX ADMIN — BUMETANIDE 2 MG: 0.25 INJECTION, SOLUTION INTRAMUSCULAR; INTRAVENOUS at 08:39

## 2021-07-16 RX ADMIN — BUMETANIDE 2 MG: 0.25 INJECTION, SOLUTION INTRAMUSCULAR; INTRAVENOUS at 12:53

## 2021-07-16 RX ADMIN — IPRATROPIUM BROMIDE AND ALBUTEROL SULFATE 3 ML: 2.5; .5 SOLUTION RESPIRATORY (INHALATION) at 12:32

## 2021-07-16 RX ADMIN — SODIUM CHLORIDE, PRESERVATIVE FREE 10 ML: 5 INJECTION INTRAVENOUS at 08:39

## 2021-07-16 RX ADMIN — IPRATROPIUM BROMIDE AND ALBUTEROL SULFATE 3 ML: 2.5; .5 SOLUTION RESPIRATORY (INHALATION) at 23:24

## 2021-07-16 RX ADMIN — BUMETANIDE 2 MG: 0.25 INJECTION, SOLUTION INTRAMUSCULAR; INTRAVENOUS at 18:08

## 2021-07-16 RX ADMIN — Medication 1 CAPSULE: at 08:39

## 2021-07-16 RX ADMIN — IPRATROPIUM BROMIDE AND ALBUTEROL SULFATE 3 ML: 2.5; .5 SOLUTION RESPIRATORY (INHALATION) at 04:22

## 2021-07-16 RX ADMIN — POTASSIUM CHLORIDE 40 MEQ: 750 CAPSULE, EXTENDED RELEASE ORAL at 18:08

## 2021-07-16 RX ADMIN — TAZOBACTAM SODIUM AND PIPERACILLIN SODIUM 4.5 G: 500; 4 INJECTION, SOLUTION INTRAVENOUS at 05:12

## 2021-07-16 RX ADMIN — SALINE NASAL SPRAY 1 SPRAY: 1.5 SOLUTION NASAL at 20:59

## 2021-07-16 RX ADMIN — ENOXAPARIN SODIUM 30 MG: 30 INJECTION SUBCUTANEOUS at 08:39

## 2021-07-16 RX ADMIN — FLUTICASONE PROPIONATE 2 SPRAY: 50 SPRAY, METERED NASAL at 11:03

## 2021-07-16 NOTE — PROGRESS NOTES
"[facility]   IP Progress Note   LOS: 4 days   Patient Care Team:  Cam Cheung MD as PCP - General    Chief Complaint: Follow up for diastolic CHF.    Subjective    Resting, currently on BiPAP, oxygenation remains an issue. No chest pain. Edema has improved. Diuresing well.       Active Hospital Problems    Diagnosis    • **Acute on chronic respiratory failure with hypoxia and hypercapnia (CMS/HCC)    • Acute on chronic diastolic CHF (congestive heart failure) (CMS/HCC)      · Echo 7-12-21: Left ventricular wall thickness is consistent with moderate to severe concentric hypertrophy. Left ventricular ejection fraction appears to be 51 - 55%.   • Hypertensive emergency    • Mixed hyperlipidemia    • Hypertension, essential    • Prediabetes    • Class 3 severe obesity due to excess calories with serious comorbidity and body mass index (BMI) of 40.0 to 44.9 in adult (CMS/HCC)    • Acute pulmonary edema (CMS/HCC)    • Obstructive sleep apnea    • Obesity hypoventilation syndrome (CMS/HCC) -suspected       Tele: Sinus Rythym    Vitals:  /80   Pulse 89   Temp 98.3 °F (36.8 °C) (Axillary)   Resp (!) 29   Ht 185.4 cm (72.99\")   Wt (!) 175 kg (386 lb 7.5 oz)   SpO2 91%   BMI 51.00 kg/m²    Body mass index is 51 kg/m².    Intake/Output Summary (Last 24 hours) at 7/16/2021 0801  Last data filed at 7/16/2021 0512  Gross per 24 hour   Intake 1326 ml   Output 3225 ml   Net -1899 ml       Physical Exam:  General: No apparent distress. On BiPAP  Neck: no JVD.  Chest: Diminished breath sounds at bases, scattered rhonchi.   Cardiovascular: Normal S1 and S2, distant heart sounds..    Extremities: Trace to 1+ edema.    Results Review:         Labs:  Results from last 7 days   Lab Units 07/16/21  0402   WBC 10*3/mm3 11.71*   HEMOGLOBIN g/dL 17.3   HEMATOCRIT % 58.9*   PLATELETS 10*3/mm3 250     Results from last 7 days   Lab Units 07/16/21  0402 07/15/21  0603 07/14/21  0330   SODIUM mmol/L 134* " 136 137   POTASSIUM mmol/L 4.3 3.7 3.2*   CHLORIDE mmol/L 92* 93* 91*   CO2 mmol/L 32.0* 33.0* 33.0*   BUN mg/dL 20 15 13   CREATININE mg/dL 0.82 0.88 1.00   GLUCOSE mg/dL 147* 118* 105*   CALCIUM mg/dL 9.5 9.2 9.0   ALT (SGPT) U/L 57* 28 43*   AST (SGOT) U/L 44* 15 21     Estimated Creatinine Clearance: 191.9 mL/min (by C-G formula based on SCr of 0.82 mg/dL).  Lab Results   Component Value Date    HGBA1C 6.1 06/02/2021     Results from last 7 days   Lab Units 07/12/21  1552   TROPONIN T ng/mL 0.019     Results from last 7 days   Lab Units 07/16/21  0402 07/12/21  1552   PROBNP pg/mL 55.8 845.1*     Lab Results   Component Value Date    CHLPL 130 06/02/2021    TRIG 114 06/02/2021    HDL 36 (L) 06/02/2021    LDL 73 06/02/2021             COVID19   Date Value Ref Range Status   07/12/2021 Not Detected Not Detected - Ref. Range Final         Scheduled Meds:  allopurinol, 100 mg, Oral, Daily  aspirin, 81 mg, Oral, Daily  bumetanide, 2 mg, Intravenous, BID  carvedilol, 12.5 mg, Oral, Q12H  colchicine, 1.2 mg, Oral, BID  enoxaparin, 30 mg, Subcutaneous, Q12H  ipratropium-albuterol, 3 mL, Nebulization, Q4H - RT  lactobacillus acidophilus, 1 capsule, Oral, Daily  methylPREDNISolone sodium succinate, 20 mg, Intravenous, Q8H  pantoprazole, 40 mg, Oral, Q AM  pharmacy consult - MTM, , Does not apply, Daily  piperacillin-tazobactam, 4.5 g, Intravenous, Q8H  sodium chloride, 10 mL, Intravenous, Q12H      Assessment:  1. Acute diastolic congestive heart failure presenting with hypoxemic respiratory failure.  Normal EF by echocardiogram. Negative bubble study for intracardiac shunt.  2. The degree of hypoxemia is out of proportion to fluid overload, pulmonary service following.  3. Hypertension which has been previously labile, currently controlled.  4. Obstructive sleep apnea.  5. Moderate obesity.  6. Abnormal liver function tests with elevated bilirubin.     Plan:  1. Continue current management including current dose of  aspirin, Coreg and diuretics.  2. Monitor intake output/renal function.  3. ICU team managing medical conditions and respiratory failure.  4. I will revisit on Monday, 11/19/2021, please call if needed sooner.    Electronically signed by MIREILLE Aiken, 07/16/21, 8:04 AM EDT.    I have seen and examined the patient, case was discussed with the physician extender, reviewed the above note, necessary changes were made and I agree with the final note.   Myra Montano MD, FACC, [facility]

## 2021-07-16 NOTE — PLAN OF CARE
Goal Outcome Evaluation:  Plan of Care Reviewed With: patient        Progress: no change  Outcome Summary: PT initial eval completed. Pt limited by balance deficit and decreased activity tolerance. Pt amb 300ft wiht SBA requiring one 2 min standing rest break d/t desaturation to 87% on 25L 100% NRB. Cueing requried for PLB. Rec continued skilled IP PT to increase indep with mobility. d/c rec for HWA and OP pulm rehab.

## 2021-07-16 NOTE — PLAN OF CARE
Goal Outcome Evaluation:  Plan of Care Reviewed With: patient        Progress: improving  Outcome Summary: OT eval complete. Pt is Supervision for toileting ADLs and bed mobility, CGA for t/fs. Pt limited d/t decreased endurance from baseline, O2 sats >87% on NRB. Pt educated on BUE HEP w/ PLB  and encouraged to perform outside of treatment session. Recommend cont skilled IPOT. Recommend pt DC home w/ assist and OP pulmonary rehab.

## 2021-07-16 NOTE — THERAPY EVALUATION
Patient Name: Norberto Keita  : 1975    MRN: [id number]                              Today's Date: 2021       Admit Date: 2021    Visit Dx:     ICD-10-CM ICD-9-CM   1. Acute respiratory failure with hypoxia and hypercarbia (CMS/HCC)  J96.01 518.81    J96.02      Patient Active Problem List   Diagnosis   • Gout of big toe   • Generalized anxiety disorder   • Hypertension, essential   • Reduced libido   • Hypogonadism in male   • Class 3 severe obesity due to excess calories with serious comorbidity and body mass index (BMI) of 40.0 to 44.9 in adult (CMS/HCC)   • Acute on chronic respiratory failure with hypoxia and hypercapnia (CMS/HCC)   • Acute pulmonary edema (CMS/HCC)   • Hypertensive emergency   • Obstructive sleep apnea   • Obesity hypoventilation syndrome (CMS/HCC) -suspected   • Acute on chronic diastolic congestive heart failure (CMS/HCC)   • Mixed hyperlipidemia   • Prediabetes     Past Medical History:   Diagnosis Date   • Sacroiliitis (CMS/HCC)      Past Surgical History:   Procedure Laterality Date   • HYDROCELE EXCISION / REPAIR       [facility] [place] Vaginal[facility] Excision of Hydrocele   • KNEE SURGERY Left    • VASECTOMY       General Information     Row Name 21 1037          Physical Therapy Time and Intention    Document Type  evaluation  -AY     Mode of Treatment  physical therapy  -AY     Row Name 21 1037          General Information    Patient Profile Reviewed  yes  -AY     Prior Level of Function  independent:;all household mobility;community mobility;gait;transfer;bed mobility;using stairs  -AY     Existing Precautions/Restrictions  oxygen therapy device and L/min HFNC > 100% NRB  -AY     Barriers to Rehab  medically complex  -AY     Row Name 21 1037          Living Environment    Lives With  child(josefa), adult;child(josefa), dependent;spouse Patient lives with his wife, two sons ages 16 & 24 and his 21 y/o niece.  -AY     Row Name 21 1037          Home Main  Entrance    Number of Stairs, Main Entrance  two  -AY     Stair Railings, Main Entrance  railings safe and in good condition  -AY     Row Name 07/16/21 1037          Stairs Within Home, Primary    Number of Stairs, Within Home, Primary  none  -AY     Row Name 07/16/21 1037          Cognition    Orientation Status (Cognition)  oriented x 4  -AY     Row Name 07/16/21 1037          Safety Issues, Functional Mobility    Impairments Affecting Function (Mobility)  shortness of breath;endurance/activity tolerance;pain  -AY       User Key  (r) = Recorded By, (t) = Taken By, (c) = Cosigned By    Initials Name Provider Type    Zulema Herrera PT Physical Therapist        Mobility     Row Name 07/16/21 1038          Bed Mobility    Comment (Bed Mobility)  pt received sitting EOB with OT  -AY     Row Name 07/16/21 1038          Sit-Stand Transfer    Sit-Stand [place] (Transfers)  contact guard  -AY     Row Name 07/16/21 1038          Gait/Stairs (Locomotion)    [place] Level (Gait)  supervision  -AY     Distance in Feet (Gait)  300  -AY     Deviations/Abnormal Patterns (Gait)  base of support, wide;gait speed decreased;raheem decreased;stride length decreased  -AY     Bilateral Gait Deviations  heel strike decreased  -AY     Comment (Gait/Stairs)  pt demonstrated step through gait pattern with decreased raheem and flexed posture. Wide ESTRELLA noted. VC for posture and PLB. One brief standing rest break required d/t O2 dropping to 87% on 100% 25L NRB. After 2 min of PLB, recovery noted to 89%. Gait distance limited by activity tolerance.  -AY       User Key  (r) = Recorded By, (t) = Taken By, (c) = Cosigned By    Initials Name Provider Type    Zulema Herrera PT Physical Therapist        Obj/Interventions     Row Name 07/16/21 1039          Range of Motion Comprehensive    General Range of Motion  bilateral lower extremity ROM WFL  -AY     Row Name 07/16/21 1039          Strength Comprehensive (MMT)    General Manual  Muscle Testing (MMT) Assessment  no strength deficits identified  -AY     Comment, General Manual Muscle Testing (MMT) Assessment  BLE grossly 4+/5; WFL  -AY     Row Name 07/16/21 1039          Balance    Balance Assessment  sitting static balance;sitting dynamic balance;standing static balance;standing dynamic balance  -AY     Static Sitting Balance  WFL;sitting, edge of bed  -AY     Dynamic Sitting Balance  WFL;sitting, edge of bed  -AY     Static Standing Balance  WFL;supported;standing  -AY     Dynamic Standing Balance  mild impairment;unsupported;standing  -AY     Row Name 07/16/21 1039          Sensory Assessment (Somatosensory)    Sensory Assessment (Somatosensory)  LE sensation intact  -AY       User Key  (r) = Recorded By, (t) = Taken By, (c) = Cosigned By    Initials Name Provider Type    AY Zulema Mills, PT Physical Therapist        Goals/Plan     Row Name 07/16/21 1043          Bed Mobility Goal 1 (PT)    Activity/Assistive Device (Bed Mobility Goal 1, PT)  sit to supine/supine to sit  -AY     [place] Level/Cues Needed (Bed Mobility Goal 1, PT)  independent  -AY     Time Frame (Bed Mobility Goal 1, PT)  long term goal (LTG);2 weeks  -AY     Row Name 07/16/21 1043          Transfer Goal 1 (PT)    Activity/Assistive Device (Transfer Goal 1, PT)  sit-to-stand/stand-to-sit  -AY     [place] Level/Cues Needed (Transfer Goal 1, PT)  independent  -AY     Time Frame (Transfer Goal 1, PT)  long term goal (LTG);2 weeks  -AY     Row Name 07/16/21 1043          Gait Training Goal 1 (PT)    Activity/Assistive Device (Gait Training Goal 1, PT)  gait (walking locomotion)  -AY     [place] Level (Gait Training Goal 1, PT)  independent  -AY     Distance (Gait Training Goal 1, PT)  500  -AY     Time Frame (Gait Training Goal 1, PT)  long term goal (LTG);2 weeks  -AY     Strategies/Barriers (Gait Training Goal 1, PT)  O2 >91%  -AY     Row Name 07/16/21 1043          Stairs Goal 1 (PT)    Activity/Assistive  Device (Stairs Goal 1, PT)  ascending stairs;descending stairs  -AY     [place] Level/Cues Needed (Stairs Goal 1, PT)  supervision required  -AY     Number of Stairs (Stairs Goal 1, PT)  2  -AY     Time Frame (Stairs Goal 1, PT)  long term goal (LTG);2 weeks  -AY       User Key  (r) = Recorded By, (t) = Taken By, (c) = Cosigned By    Initials Name Provider Type    AY Zulema Mills, PT Physical Therapist        Clinical Impression     Row Name 07/16/21 1040          Pain    Additional Documentation  Pain Scale: FACES Pre/Post-Treatment (Group)  -AY     Row Name 07/16/21 1040          Pain Scale: Numbers Pre/Post-Treatment    Pain Intervention(s)  Ambulation/increased activity;Repositioned  -AY     Row Name 07/16/21 1040          Pain Scale: FACES Pre/Post-Treatment    Pain: FACES Scale, Pretreatment  2-->hurts little bit  -AY     Posttreatment Pain Rating  2-->hurts little bit  -AY     Pain Location - Side  Bilateral  -AY     Pain Location  foot  -AY     Pre/Posttreatment Pain Comment  Gout pain. RN aware and managing; pt premedicated  -AY     Row Name 07/16/21 1040          Plan of Care Review    Plan of Care Reviewed With  patient  -AY     Progress  no change  -AY     Outcome Summary  PT initial eval completed. Pt limited by balance deficit and decreased activity tolerance. Pt amb 300ft wiht SBA requiring one 2 min standing rest break d/t desaturation to 87% on 25L 100% NRB. Cueing requried for PLB. Rec continued skilled IP PT to increase indep with mobility. d/c rec for HWA and OP pulm rehab.  -AY     Row Name 07/16/21 1040          Therapy Assessment/Plan (PT)    Rehab Potential (PT)  good, to achieve stated therapy goals  -AY     Criteria for Skilled Interventions Met (PT)  yes;meets criteria;skilled treatment is necessary  -AY     Row Name 07/16/21 1040          Vital Signs    Pre Systolic BP Rehab  123  -AY     Pre Treatment Diastolic BP  89  -AY     Post Systolic BP Rehab  139  -AY     Post Treatment  Diastolic BP  92  -AY     Pretreatment Heart Rate (beats/min)  96  -AY     Intratreatment Heart Rate (beats/min)  120  -AY     Posttreatment Heart Rate (beats/min)  98  -AY     Pre SpO2 (%)  92  -AY     O2 Delivery Pre Treatment  hi-flow  -AY     Intra SpO2 (%)  87  -AY     O2 Delivery Intra Treatment  non-rebreather  -AY     Post SpO2 (%)  92  -AY     O2 Delivery Post Treatment  hi-flow  -AY     Pre Patient Position  Sitting  -AY     Intra Patient Position  Standing  -AY     Post Patient Position  Sitting  -AY     Row Name 07/16/21 1040          Positioning and Restraints    Pre-Treatment Position  in bed  -AY     Post Treatment Position  chair  -AY     In Chair  notified nsg;reclined;sitting;call light within reach;encouraged to call for assist;exit alarm on;waffle cushion;legs elevated;LUE elevated;RUE elevated  -AY       User Key  (r) = Recorded By, (t) = Taken By, (c) = Cosigned By    Initials Name Provider Type    Zulema Herrera PT Physical Therapist        Outcome Measures     Row Name 07/16/21 1044          How much help from another person do you currently need...    Turning from your back to your side while in flat bed without using bedrails?  4  -AY     Moving from lying on back to sitting on the side of a flat bed without bedrails?  3  -AY     Moving to and from a bed to a chair (including a wheelchair)?  3  -AY     Standing up from a chair using your arms (e.g., wheelchair, bedside chair)?  3  -AY     Climbing 3-5 steps with a railing?  3  -AY     To walk in hospital room?  3  -AY     AM-PAC 6 Clicks Score (PT)  19  -AY     Row Name 07/16/21 1044          Functional Assessment    Outcome Measure Options  AM-PAC 6 Clicks Basic Mobility (PT)  -AY       User Key  (r) = Recorded By, (t) = Taken By, (c) = Cosigned By    Initials Name Provider Type    Zulema Herrera PT Physical Therapist        Physical Therapy Education                 Title: PT OT SLP Therapies (In Progress)     Topic: Physical  Therapy (In Progress)     Point: Mobility training (Done)     Learning Progress Summary           Patient Acceptance, E,TB, VU,NR by AY at 7/16/2021 1045                   Point: Home exercise program (Not Started)     Learner Progress:  Not documented in this visit.          Point: Body mechanics (Done)     Learning Progress Summary           Patient Acceptance, E,TB, VU,NR by AY at 7/16/2021 1045                   Point: Precautions (Done)     Learning Progress Summary           Patient Acceptance, E,TB, VU,NR by AY at 7/16/2021 1045                               User Key     Initials Effective Dates Name Provider Type Discipline    AY 11/10/20 -  Zulema Mills, PT Physical Therapist PT              PT Recommendation and Plan  Planned Therapy Interventions (PT): balance training, bed mobility training, gait training, home exercise program, patient/family education, strengthening, stair training, transfer training  Plan of Care Reviewed With: patient  Progress: no change  Outcome Summary: PT initial eval completed. Pt limited by balance deficit and decreased activity tolerance. Pt amb 300ft wiht SBA requiring one 2 min standing rest break d/t desaturation to 87% on 25L 100% NRB. Cueing requried for PLB. Rec continued skilled IP PT to increase indep with mobility. d/c rec for HWA and OP pulm rehab.     Time Calculation:   PT Charges     Row Name 07/16/21 1045             Time Calculation    Start Time  0930  -AY      PT Received On  07/16/21  -AY      PT Goal Re-Cert Due Date  07/26/21  -AY         Time Calculation- PT    Total Timed Code Minutes- PT  10 minute(s)  -AY         Timed Charges    54874 - Gait Training Minutes   10  -AY         Untimed Charges    PT Eval/Re-eval Minutes  46  -AY         Total Minutes    Timed Charges Total Minutes  10  -AY      Untimed Charges Total Minutes  46  -AY       Total Minutes  56  -AY        User Key  (r) = Recorded By, (t) = Taken By, (c) = Cosigned By    Initials Name  Provider Type    AY Zulema Mills, PT Physical Therapist        Therapy Charges for Today     Code Description Service Date Service Provider Modifiers Qty    77189015163 HC GAIT TRAINING EA 15 MIN 7/16/2021 Zulema Mills, PT GP 1    50353309219 HC PT EVAL LOW COMPLEXITY 4 7/16/2021 Zulema Mills, PT GP 1          PT G-Codes  Outcome Measure Options: AM-PAC 6 Clicks Basic Mobility (PT)  AM-PAC 6 Clicks Score (PT): 19    Zulema Mills PT  7/16/2021

## 2021-07-16 NOTE — CASE MANAGEMENT/SOCIAL WORK
Continued Stay Note  [facility]     Patient Name: Norberto Keita  MRN: [id number]  Today's Date: 7/16/2021    Admit Date: 7/12/2021    Discharge Plan     Row Name 07/16/21 1402       Plan    Plan  Home    Plan Comments  Plan is home upon discharge.  Continues to use BiPAP at night and on HFNC during day. CM will continue to follow.        Discharge Codes    No documentation.       Expected Discharge Date and Time     Expected Discharge Date Expected Discharge Time    Jul 20, 2021             Yasmin Georges RN

## 2021-07-16 NOTE — THERAPY EVALUATION
Patient Name: Norberto Keita  : 1975    MRN: [id number]                              Today's Date: 2021       Admit Date: 2021    Visit Dx:     ICD-10-CM ICD-9-CM   1. Acute respiratory failure with hypoxia and hypercarbia (CMS/HCC)  J96.01 518.81    J96.02      Patient Active Problem List   Diagnosis   • Gout of big toe   • Generalized anxiety disorder   • Hypertension, essential   • Reduced libido   • Hypogonadism in male   • Class 3 severe obesity due to excess calories with serious comorbidity and body mass index (BMI) of 40.0 to 44.9 in adult (CMS/HCC)   • Acute on chronic respiratory failure with hypoxia and hypercapnia (CMS/HCC)   • Acute pulmonary edema (CMS/HCC)   • Hypertensive emergency   • Obstructive sleep apnea   • Obesity hypoventilation syndrome (CMS/[facility]) -suspected   • Acute on chronic diastolic congestive heart failure (CMS/HCC)   • Mixed hyperlipidemia   • Prediabetes     Past Medical History:   Diagnosis Date   • Sacroiliitis (CMS/[facility])      Past Surgical History:   Procedure Laterality Date   • HYDROCELE EXCISION / REPAIR       Surgery [place] Vaginalis Excision of Hydrocele   • KNEE SURGERY Left    • VASECTOMY       General Information     Row Name 21 1257          OT Time and Intention    Document Type  evaluation  -CL     Mode of Treatment  occupational therapy  -CL     Row Name 21 1257          General Information    Patient Profile Reviewed  yes  -CL     Prior Level of Function  independent:;all household mobility;ADL's  -CL     Existing Precautions/Restrictions  oxygen therapy device and L/min;other (see comments) NRB for mobility  -CL     Barriers to Rehab  medically complex  -CL     Row Name 21 1257          Living Environment    Lives With  child(josefa), dependent;spouse  -CL     Row Name 21 1257          Home Main Entrance    Number of Stairs, Main Entrance  two  -CL     Row Name 21 1257          Cognition    Orientation Status  (Cognition)  oriented x 4  -CL     Row Name 07/16/21 1257          Safety Issues, Functional Mobility    Impairments Affecting Function (Mobility)  shortness of breath;endurance/activity tolerance;pain  -CL       User Key  (r) = Recorded By, (t) = Taken By, (c) = Cosigned By    Initials Name Provider Type    Lakeisha Rosado OT Occupational Therapist          Mobility/ADL's     Row Name 07/16/21 1258          Bed Mobility    Bed Mobility  supine-sit  -CL     Supine-Sit [place] (Bed Mobility)  supervision;verbal cues  -CL     Assistive Device (Bed Mobility)  bed rails;head of bed elevated  -CL     Row Name 07/16/21 1258          Transfers    Transfers  sit-stand transfer  -CL     Sit-Stand [place] (Transfers)  contact guard;verbal cues  -CL     Row Name 07/16/21 1258          Activities of Daily Living    BADL Assessment/Intervention  toileting  -CL     Row Name 07/16/21 1258          Toileting Assessment/Training    [place] Level (Toileting)  supervision;verbal cues  -CL     Assistive Devices (Toileting)  urinal  -CL     Position (Toileting)  supine  -CL       User Key  (r) = Recorded By, (t) = Taken By, (c) = Cosigned By    Initials Name Provider Type    Lakeisha Rosado OT Occupational Therapist        Obj/Interventions     Row Name 07/16/21 1259          Sensory Assessment (Somatosensory)    Sensory Assessment (Somatosensory)  UE sensation intact  -     Row Name 07/16/21 1259          Range of Motion Comprehensive    General Range of Motion  bilateral upper extremity ROM WFL  -     Row Name 07/16/21 1259          Strength Comprehensive (MMT)    Comment, General Manual Muscle Testing (MMT) Assessment  BUE grossly WFL  -     Row Name 07/16/21 1259          Shoulder (Therapeutic Exercise)    Shoulder (Therapeutic Exercise)  AROM (active range of motion)  -CL     Shoulder AROM (Therapeutic Exercise)  bilateral;flexion;external rotation;aDduction;horizontal aBduction/aDduction;scapular  retraction;scapular elevation;10 repetitions  -CL     Row Name 07/16/21 1259          Elbow/Forearm (Therapeutic Exercise)    Elbow/Forearm (Therapeutic Exercise)  AROM (active range of motion)  -CL     Elbow/Forearm AROM (Therapeutic Exercise)  bilateral;flexion;extension;10 repetitions  -CL     Row Name 07/16/21 1259          Balance    Balance Assessment  sitting static balance;sitting dynamic balance;standing static balance  -CL     Static Sitting Balance  WFL;sitting, edge of bed  -CL     Dynamic Sitting Balance  WFL;sitting, edge of bed  -CL     Static Standing Balance  WFL;unsupported  -CL     Row Name 07/16/21 1259          Therapeutic Exercise    Therapeutic Exercise  shoulder;elbow/forearm  -CL       User Key  (r) = Recorded By, (t) = Taken By, (c) = Cosigned By    Initials Name Provider Type    CL Lakeisha Arceo, LUIS Occupational Therapist        Goals/Plan     Row Name 07/16/21 1304          Transfer Goal 1 (OT)    Activity/Assistive Device (Transfer Goal 1, OT)  sit-to-stand/stand-to-sit;toilet  -CL     Davis Level/Cues Needed (Transfer Goal 1, OT)  supervision required  -CL     Time Frame (Transfer Goal 1, OT)  10 days;long term goal (LTG)  -CL     Progress/Outcome (Transfer Goal 1, OT)  goal ongoing  -CL     Row Name 07/16/21 1304          Dressing Goal 1 (OT)    Activity/Device (Dressing Goal 1, OT)  lower body dressing don socks/pants  -CL     Davis/Cues Needed (Dressing Goal 1, OT)  supervision required  -CL     Time Frame (Dressing Goal 1, OT)  long term goal (LTG);10 days  -CL     Progress/Outcome (Dressing Goal 1, OT)  goal ongoing  -CL     Row Name 07/16/21 1304          Strength Goal 1 (OT)    Strength Goal 1 (OT)  Pt will tolerate BUE AROM HEP w/ PLB x20 reps.  -CL     Time Frame (Strength Goal 1, OT)  long term goal (LTG);10 days  -CL     Progress/Outcome (Strength Goal 1, OT)  goal ongoing  -CL       User Key  (r) = Recorded By, (t) = Taken By, (c) = Cosigned By    Initials Name  Provider Type    CL Lakeisha Arceo, LUIS Occupational Therapist        Clinical Impression     Row Name 07/16/21 1254          Pain Scale: FACES Pre/Post-Treatment    Pain: FACES Scale, Pretreatment  0-->no hurt  -CL     Posttreatment Pain Rating  0-->no hurt  -CL     Row Name 07/16/21 1256          Plan of Care Review    Plan of Care Reviewed With  patient  -CL     Progress  improving  -CL     Outcome Summary  OT eval complete. Pt is Supervision for toileting ADLs and bed mobility, CGA for t/fs. Pt limited d/t decreased endurance from baseline, O2 sats >87% on NRB. Pt educated on BUE HEP w/ PLB  and encouraged to perform outside of treatment session. Recommend cont skilled IPOT. Recommend pt DC home w/ assist and OP pulmonary rehab.  -CL     Row Name 07/16/21 1255          Therapy Assessment/Plan (OT)    Patient/Family Therapy Goal Statement (OT)  Return to PLOF  -CL     Rehab Potential (OT)  good, to achieve stated therapy goals  -CL     Criteria for Skilled Therapeutic Interventions Met (OT)  yes;skilled treatment is necessary  -CL     Therapy Frequency (OT)  daily  -CL     Row Name 07/16/21 1250          Therapy Plan Review/Discharge Plan (OT)    Anticipated Discharge Disposition (OT)  home with assist;home with outpatient therapy services  -CL     Row Name 07/16/21 1250          Vital Signs    Pre SpO2 (%)  90  -CL     O2 Delivery Pre Treatment  hi-flow  -CL     Intra SpO2 (%)  87  -CL     O2 Delivery Intra Treatment  non-rebreather  -CL     Post SpO2 (%)  91  -CL     O2 Delivery Post Treatment  non-rebreather  -CL     Pre Patient Position  Supine  -CL     Intra Patient Position  Standing  -CL     Post Patient Position  Sitting  -CL     Row Name 07/16/21 125          Positioning and Restraints    Pre-Treatment Position  in bed  -CL     Post Treatment Position  bed  -CL     In Bed  notified nsg;call light within reach;encouraged to call for assist;sitting EOB;with PT  -CL       User Key  (r) = Recorded By, (t) =  Taken By, (c) = Cosigned By    Initials Name Provider Type    Lakeisha Rosado OT Occupational Therapist        Outcome Measures     Row Name 07/16/21 1305          How much help from another is currently needed...    Putting on and taking off regular lower body clothing?  2  -CL     Bathing (including washing, rinsing, and drying)  2  -CL     Toileting (which includes using toilet bed pan or urinal)  3  -CL     Putting on and taking off regular upper body clothing  4  -CL     Taking care of personal grooming (such as brushing teeth)  4  -CL     Eating meals  4  -CL     AM-PAC 6 Clicks Score (OT)  19  -CL     Row Name 07/16/21 1044          How much help from another person do you currently need...    Turning from your back to your side while in flat bed without using bedrails?  4  -AY     Moving from lying on back to sitting on the side of a flat bed without bedrails?  3  -AY     Moving to and from a bed to a chair (including a wheelchair)?  3  -AY     Standing up from a chair using your arms (e.g., wheelchair, bedside chair)?  3  -AY     Climbing 3-5 steps with a railing?  3  -AY     To walk in hospital room?  3  -AY     AM-PAC 6 Clicks Score (PT)  19  -AY     Row Name 07/16/21 1305 07/16/21 1044       Functional Assessment    Outcome Measure Options  AM-PAC 6 Clicks Daily Activity (OT)  -CL  AM-PAC 6 Clicks Basic Mobility (PT)  -AY      User Key  (r) = Recorded By, (t) = Taken By, (c) = Cosigned By    Initials Name Provider Type    Lakeisha Rosado OT Occupational Therapist    Zulema Herrera PT Physical Therapist        Occupational Therapy Education                 Title: PT OT SLP Therapies (In Progress)     Topic: Occupational Therapy (In Progress)     Point: ADL training (In Progress)     Description:   Instruct learner(s) on proper safety adaptation and remediation techniques during self care or transfers.   Instruct in proper use of assistive devices.              Learning Progress Summary            Patient Acceptance, E, NR by CL at 7/16/2021 1306                   Point: Home exercise program (In Progress)     Description:   Instruct learner(s) on appropriate technique for monitoring, assisting and/or progressing therapeutic exercises/activities.              Learning Progress Summary           Patient Acceptance, E, NR by CL at 7/16/2021 1306                   Point: Precautions (In Progress)     Description:   Instruct learner(s) on prescribed precautions during self-care and functional transfers.              Learning Progress Summary           Patient Acceptance, E, NR by CL at 7/16/2021 1306                   Point: Body mechanics (In Progress)     Description:   Instruct learner(s) on proper positioning and spine alignment during self-care, functional mobility activities and/or exercises.              Learning Progress Summary           Patient Acceptance, E, NR by CL at 7/16/2021 1306                               User Key     Initials Effective Dates Name Provider Type Discipline     04/03/18 -  Lakeisha Arceo OT Occupational Therapist OT              OT Recommendation and Plan  Therapy Frequency (OT): daily  Plan of Care Review  Plan of Care Reviewed With: patient  Progress: improving  Outcome Summary: OT eval complete. Pt is Supervision for toileting ADLs and bed mobility, CGA for t/fs. Pt limited d/t decreased endurance from baseline, O2 sats >87% on NRB. Pt educated on BUE HEP w/ PLB  and encouraged to perform outside of treatment session. Recommend cont skilled IPOT. Recommend pt DC home w/ assist and OP pulmonary rehab.     Time Calculation:   Time Calculation- OT     Row Name 07/16/21 1306 07/16/21 1045          Time Calculation- OT    OT Start Time  0920  -CL  --     OT Received On  07/16/21  -CL  --     OT Goal Re-Cert Due Date  07/26/21  -CL  --        Timed Charges    88948 - OT Therapeutic Exercise Minutes  8  -CL  --     46842 - Gait Training Minutes   --  10  -AY        Untimed  Charges    OT Eval/Re-eval Minutes  31  -CL  --        Total Minutes    Timed Charges Total Minutes  8  -CL  10  -AY     Untimed Charges Total Minutes  31  -CL  --      Total Minutes  39  -CL  10  -AY       User Key  (r) = Recorded By, (t) = Taken By, (c) = Cosigned By    Initials Name Provider Type    CL Lakeisha Arceo, OT Occupational Therapist    AY Zulema Mills, PT Physical Therapist        Therapy Charges for Today     Code Description Service Date Service Provider Modifiers Qty    [id number]  OT THER PROC EA 15 MIN 7/16/2021 Lakeisha Arceo OT GO 1    [id number]  OT EVAL LOW COMPLEXITY 3 7/16/2021 Lakeisha Arceo OT GO 1               Lakeisha Arceo OT  7/16/2021

## 2021-07-16 NOTE — PROGRESS NOTES
INTENSIVIST / PULMONARY FOLLOW UP NOTE     Hospital:  LOS: 4 days   Mr. Norberto Keita, 45 y.o. male is followed for:     Acute on chronic respiratory failure with hypoxia and hypercapnia (CMS/HCC)    Hypertension, essential    Class 3 severe obesity due to excess calories with serious comorbidity and body mass index (BMI) of 40.0 to 44.9 in adult (CMS/HCC)    Acute pulmonary edema (CMS/HCC)    Hypertensive emergency    Obstructive sleep apnea    Obesity hypoventilation syndrome (CMS/HCC) -suspected    Acute on chronic diastolic congestive heart failure (CMS/HCC)    Mixed hyperlipidemia    Prediabetes          SUBJECTIVE   Patient remains on 100% FiO2 on HFNC this morning    The patient's relevant past medical, surgical, family, and social history were reviewed    Allergies and medications were reviewed    ROS:  Per subjective, all other systems were reviewed and were negative        OBJECTIVE     Vital Sign Min/Max for last 24 hours:  Temp  Min: 97.8 °F (36.6 °C)  Max: 98.3 °F (36.8 °C)   BP  Min: 109/75  Max: 148/88   Pulse  Min: 76  Max: 101   Resp  Min: 20  Max: 29   SpO2  Min: 86 %  Max: 96 %   No data recorded     Physical Exam:  General Appearance:  Conversant, in no distress  Eyes:  No scleral icterus or pallor, pupils normal  Ears, Nose, Mouth, Throat:  Atraumatic, oropharynx clear  Neck:  Trachea midline, thyroid normal  Respiratory: diminished to auscultation bilaterally, normal effort, no tenderness to palpation  Cardiovascular:  Regular rate and rhythm, no murmurs, 1+ peripheral edema, no thrill  Gastrointestinal:  Soft, nontender, nondistended, no hepatosplenomegaly  Skin:  Normal temperature, no rash  Psychiatric:  Alert and oriented x 3, normal judgement and insight  Neuro:  No new focal neurologic deficits observed  MSK:  Right elbow warm and tender    Telemetry:              Hemodynamics:   CVP:     PAP:     PAOP:     CO:     CI:     SVI:     SVR:       SpO2: 94 % SpO2  Min: 86 %  Max: 96 %    Device:      Flow Rate:   No data recorded     Mechanical Ventilator Settings:                                         Intake/Ouptut 24 hrs (7:00AM - 6:59 AM)  Intake & Output (last 3 days)       07/13 0701 - 07/14 0700 07/14 0701 - 07/15 0700 07/15 0701 - 07/16 0700 07/16 0701 - 07/17 0700    P.O. 625 1200 1380 360    I.V. (mL/kg) 137.8 (0.8) 102.9 (0.6) 30 (0.2) 16 (0.1)    IV Piggyback 528.4 413.1 396 200    Total Intake(mL/kg) 1291.2 (7.7) 1716 (10.3) 1806 (10.3) 576 (3.3)    Urine (mL/kg/hr) 5750 (1.4) 4200 (1) 3225 (0.8) 2300 (2.3)    Stool  0      Total Output 5750 4200 3225 2300    [facility]4458.8 -[phone] -1419 -1724            Urine Unmeasured Occurrence  1 x      Stool Unmeasured Occurrence  1 x            Lines, Drains & Airways    Active LDAs     Name:   Placement date:   Placement time:   Site:   Days:    Peripheral IV 07/12/21 1553 Right Antecubital   07/12/21    1553    Antecubital   less than 1    Peripheral IV 07/12/21 2300 Left Antecubital   07/12/21    2300    Antecubital   less than 1                Hematology:  Results from last 7 days   Lab Units 07/16/21  0402 07/15/21  0602 07/14/21  0330 07/12/21  1552   WBC 10*3/mm3 11.71* 9.56 10.60 10.86*   HEMOGLOBIN g/dL 17.3 17.2 16.9 16.8   HEMATOCRIT % 58.9* 56.9* 55.5* 56.9*   PLATELETS 10*3/mm3 250 205 212 219     Electrolytes, Magnesium and Phosphorus:  Results from last 7 days   Lab Units 07/16/21  0402 07/15/21  0603 07/14/21  1527 07/14/21  0330 07/13/21  1655 07/13/21  0355 07/12/21  1955 07/12/21  1552   SODIUM mmol/L 134* 136  --  137 136 138  --  136   CHLORIDE mmol/L 92* 93*  --  91* 92* 96*  --  97*   POTASSIUM mmol/L 4.3 3.7 3.8 3.2* 3.8 4.3  4.2 4.2 4.6   CO2 mmol/L 32.0* 33.0*  --  33.0* 33.0* 26.0  --  33.0*   MAGNESIUM mg/dL 2.1 2.0  --  2.0 2.0 1.7 2.0  --    PHOSPHORUS mg/dL 3.4 3.9  --  3.0 2.7 2.9  --   --      Renal:  Results from last 7 days   Lab Units 07/16/21  0402 07/15/21  0603 07/14/21  0330 07/13/21  1655 07/13/21  0355  07/12/21  1552   CREATININE mg/dL 0.82 0.88 1.00 0.91 0.87 0.72*   BUN mg/dL 20 15 13 10 9 10     Estimated Creatinine Clearance: 191.9 mL/min (by C-G formula based on SCr of 0.82 mg/dL).  Hepatic:  Results from last 7 days   Lab Units 07/16/21  0402 07/15/21  0603 07/14/21  0330 07/12/21  1552   ALK PHOS U/L 124* 110 123* 131*   BILIRUBIN mg/dL 1.8* 2.9* 2.9* 1.5*   ALT (SGPT) U/L 57* 28 43* 73*   AST (SGOT) U/L 44* 15 21 34     Arterial Blood Gases:  Results from last 7 days   Lab Units 07/14/21  0307 07/12/21  1940 07/12/21  1724 07/12/21  1605   PH, ARTERIAL pH units 7.487* 7.340* 7.280* 7.325*   PCO2, ARTERIAL mm Hg 54.9* 69.6* 77.9* 68.9*   PO2 ART mm Hg 62.9* 54.9* 83.7 61.3*   FIO2 % 100 100 90 40             Lab Results   Component Value Date    LACTATE 1.0 07/15/2021       Relevant imaging studies and labs from 07/16/21 were reviewed and interpreted by me    Medications (drips):       allopurinol, 100 mg, Oral, Daily  aspirin, 81 mg, Oral, Daily  bumetanide, 2 mg, Intravenous, Q6H  carvedilol, 12.5 mg, Oral, Q12H  colchicine, 1.2 mg, Oral, BID  doxycycline, 100 mg, Intravenous, Q12H  enoxaparin, 30 mg, Subcutaneous, Q12H  fluticasone, 2 spray, Each Nare, BID  ipratropium-albuterol, 3 mL, Nebulization, Q4H - RT  lactobacillus acidophilus, 1 capsule, Oral, Daily  methylPREDNISolone sodium succinate, 40 mg, Intravenous, Q8H  oxymetazoline, 2 spray, Each Nare, BID  pantoprazole, 40 mg, Oral, Q AM  pharmacy consult - MTM, , Does not apply, Daily  piperacillin-tazobactam, 4.5 g, Intravenous, Q8H  potassium chloride, 40 mEq, Oral, BID With Meals  sodium chloride, 10 mL, Intravenous, Q12H        Assessment/Plan   IMPRESSION / PLAN     Inpatient Problem List:  45 y.o.male:  Active Hospital Problems    Diagnosis    • **Acute on chronic respiratory failure with hypoxia and hypercapnia (CMS/HCC)    • Acute on chronic diastolic congestive heart failure (CMS/HCC)      · Echo 7-12-21: Left ventricular wall thickness is  consistent with moderate to severe concentric hypertrophy. Left ventricular ejection fraction appears to be 51 - 55%.     • Mixed hyperlipidemia    • Prediabetes    • Acute pulmonary edema (CMS/HCC)    • Hypertensive emergency    • Obstructive sleep apnea    • Obesity hypoventilation syndrome (CMS/HCC) -suspected    • Class 3 severe obesity due to excess calories with serious comorbidity and body mass index (BMI) of 40.0 to 44.9 in adult (CMS/HCC)    • Hypertension, essential         Impression:  45 y.o.male with relevant PMH of BMI 48, likely LEILANI / OHS, HTN, recently ran out of medication admitted 7/12/2021 with shortness of breath, LE edema, and uncontrolled HTN.  Found to have /125 and Severe Acute Hypoxemic & Hypercapnic Respiratory Failure requiring 100% Bipap.  CTA neg for PE, but did show bibasilar atelectasis / pneumonia, thickened LV myocardium, and enlarged PA.  No recent vomiting but has had severe reflux.    Overall appears to be a combination of Acute Diastolic HF and Bibasilar PNA / Atelectasis.  Suspect some degree of chronic Hypercapnic Respiratory Failure as pH nearly normal with PCO2 70 and BE +8.1.  Echo w/ EF 51-55%, mod to severe concentric hypertrophy, mild RV dilation, normal RVSP.  Patient Mother has h/o HOCM.    Despite aggressive treatment patient remains profound hypoxemic, but breathing comfortably.  No shunt on Echo.  No obvious occupational, recreational, or infectious exposures.    Plan:  Severe Acute Hypoxemic Respiratory Failure  AoC Hypercapnic Respiratory Failure  LEILANI  OHS  Acute Diastolic HF  Hypertensive Emergency  Bi-basilar Pneumonia / Atelectasis  Gout Flare  Elevated LFTs    Bipap hs and prn.  Keep on HFNC - wean FiO2 as able to keep sat >88.  Continue diuresis as BP / Renal function tolerates.  Empiric pip-tazo for pneumonia.  Add doxy for atypical coverage.  Duoneb w/ metaneb for pulmonary toilet. Empiric reflux treatment w/ PPI.  D/c'd vanco - MRSA PCR negative.   Tight BP control.  Coreg for DHF, mod to severe concentric LVH.  Family h/o HOCM.  Hold ARB in setting of aggressive diuresis / contrast / IV vanco.      If patient ends up having LHC would benefit from having RHC as well to exclude PAH.    Continue colchicine and allopurinol for gout flare.  Started IV solumedrol 7/15.      Inflammatory markers are elevated.  Will increase dose of solumedrol.  Check auto-immune labs, fungal serologies, respiratory viral PCR, Tick panel.    Replace electrolytes prn    Appreciate Cardiology help.    DVT / PUD prophylaxis    Nutrition - Diet Regular; Cardiac     PT/OT    Plan of care and goals reviewed with multidisciplinary team at daily rounds    Critical Condition / High Risk secondary to severe hypoxemic / hypercapnic respiratory failure, Acute DHF, Hypertensive Emergency, very high risk for decompensation / mechanical ventilation / etc.    Critical Care time spent in direct patient care: 30 minutes (excluding procedure time, if applicable) including high complexity decision making to assess, manipulate, and support vital organ system failure in this individual who has impairment of one or more vital organ systems such that there is a high probability of imminent or life threatening deterioration in the patient’s condition.       Tato Whitaker MD  Intensive Care Medicine  07/16/21 12:47 EDT

## 2021-07-16 NOTE — PLAN OF CARE
Goal Outcome Evaluation:  Plan of Care Reviewed With: patient        Progress: no change  Outcome Summary: PT rested throughout the night.  PT A&O, sinus rhythm, 100% O2 via high flow nasal cannula when awake and Bipap at night and PRN with saturation 86-91%. Pt denies SOA or any other concerns or complaints at this time.

## 2021-07-17 ENCOUNTER — APPOINTMENT (OUTPATIENT)
Dept: GENERAL RADIOLOGY | Facility: HOSPITAL | Age: 46
End: 2021-07-17

## 2021-07-17 LAB
ALBUMIN SERPL-MCNC: 4 G/DL (ref 3.5–5.2)
ALBUMIN/GLOB SERPL: 1.1 G/DL
ALP SERPL-CCNC: 117 U/L (ref 39–117)
ALT SERPL W P-5'-P-CCNC: 83 U/L (ref 1–41)
ANION GAP SERPL CALCULATED.3IONS-SCNC: 14 MMOL/L (ref 5–15)
AST SERPL-CCNC: 54 U/L (ref 1–40)
BACTERIA SPEC AEROBE CULT: NORMAL
BACTERIA SPEC AEROBE CULT: NORMAL
BASOPHILS # BLD AUTO: 0.02 10*3/MM3 (ref 0–0.2)
BASOPHILS NFR BLD AUTO: 0.1 % (ref 0–1.5)
BILIRUB SERPL-MCNC: 1.2 MG/DL (ref 0–1.2)
BUN SERPL-MCNC: 22 MG/DL (ref 6–20)
BUN/CREAT SERPL: 23.9 (ref 7–25)
CALCIUM SPEC-SCNC: 9.5 MG/DL (ref 8.6–10.5)
CHLORIDE SERPL-SCNC: 95 MMOL/L (ref 98–107)
CO2 SERPL-SCNC: 29 MMOL/L (ref 22–29)
CREAT SERPL-MCNC: 0.92 MG/DL (ref 0.76–1.27)
CRP SERPL-MCNC: 6.53 MG/DL (ref 0–0.5)
DEPRECATED RDW RBC AUTO: 47 FL (ref 37–54)
EOSINOPHIL # BLD AUTO: 0 10*3/MM3 (ref 0–0.4)
EOSINOPHIL NFR BLD AUTO: 0 % (ref 0.3–6.2)
ERYTHROCYTE [DISTWIDTH] IN BLOOD BY AUTOMATED COUNT: 16.4 % (ref 12.3–15.4)
ERYTHROCYTE [SEDIMENTATION RATE] IN BLOOD: 54 MM/HR (ref 0–15)
GFR SERPL CREATININE-BSD FRML MDRD: 89 ML/MIN/1.73
GLOBULIN UR ELPH-MCNC: 3.7 GM/DL
GLUCOSE SERPL-MCNC: 174 MG/DL (ref 65–99)
HCT VFR BLD AUTO: 58.6 % (ref 37.5–51)
HGB BLD-MCNC: 17.6 G/DL (ref 13–17.7)
IGG SERPL-MCNC: 1181 MG/DL (ref 603–1613)
IGG1 SER-MCNC: 612 MG/DL (ref 248–810)
IGG2 SER-MCNC: 270 MG/DL (ref 130–555)
IGG3 SER-MCNC: 60 MG/DL (ref 15–102)
IGG4 SER-MCNC: 47 MG/DL (ref 2–96)
IMM GRANULOCYTES # BLD AUTO: 0.09 10*3/MM3 (ref 0–0.05)
IMM GRANULOCYTES NFR BLD AUTO: 0.6 % (ref 0–0.5)
L PNEUMO1 AG UR QL IA: NEGATIVE
LYMPHOCYTES # BLD AUTO: 0.57 10*3/MM3 (ref 0.7–3.1)
LYMPHOCYTES NFR BLD AUTO: 4.1 % (ref 19.6–45.3)
MAGNESIUM SERPL-MCNC: 2.1 MG/DL (ref 1.6–2.6)
MCH RBC QN AUTO: 26.4 PG (ref 26.6–33)
MCHC RBC AUTO-ENTMCNC: 30 G/DL (ref 31.5–35.7)
MCV RBC AUTO: 87.9 FL (ref 79–97)
MONOCYTES # BLD AUTO: 0.63 10*3/MM3 (ref 0.1–0.9)
MONOCYTES NFR BLD AUTO: 4.5 % (ref 5–12)
NEUTROPHILS NFR BLD AUTO: 12.54 10*3/MM3 (ref 1.7–7)
NEUTROPHILS NFR BLD AUTO: 90.7 % (ref 42.7–76)
NRBC BLD AUTO-RTO: 0 /100 WBC (ref 0–0.2)
PHOSPHATE SERPL-MCNC: 4.2 MG/DL (ref 2.5–4.5)
PLATELET # BLD AUTO: 273 10*3/MM3 (ref 140–450)
PMV BLD AUTO: 10.5 FL (ref 6–12)
POTASSIUM SERPL-SCNC: 4.4 MMOL/L (ref 3.5–5.2)
PROCALCITONIN SERPL-MCNC: 0.08 NG/ML (ref 0–0.25)
PROT SERPL-MCNC: 7.7 G/DL (ref 6–8.5)
RBC # BLD AUTO: 6.67 10*6/MM3 (ref 4.14–5.8)
S PNEUM AG SPEC QL LA: NEGATIVE
SODIUM SERPL-SCNC: 138 MMOL/L (ref 136–145)
WBC # BLD AUTO: 13.85 10*3/MM3 (ref 3.4–10.8)

## 2021-07-17 PROCEDURE — 83735 ASSAY OF MAGNESIUM: CPT | Performed by: NURSE PRACTITIONER

## 2021-07-17 PROCEDURE — 86140 C-REACTIVE PROTEIN: CPT | Performed by: INTERNAL MEDICINE

## 2021-07-17 PROCEDURE — 84100 ASSAY OF PHOSPHORUS: CPT | Performed by: INTERNAL MEDICINE

## 2021-07-17 PROCEDURE — 25010000002 METHYLPREDNISOLONE PER 40 MG: Performed by: INTERNAL MEDICINE

## 2021-07-17 PROCEDURE — 84145 PROCALCITONIN (PCT): CPT | Performed by: INTERNAL MEDICINE

## 2021-07-17 PROCEDURE — 25010000002 PIPERACILLIN SOD-TAZOBACTAM PER 1 G: Performed by: INTERNAL MEDICINE

## 2021-07-17 PROCEDURE — 80053 COMPREHEN METABOLIC PANEL: CPT | Performed by: INTERNAL MEDICINE

## 2021-07-17 PROCEDURE — 71045 X-RAY EXAM CHEST 1 VIEW: CPT

## 2021-07-17 PROCEDURE — 94799 UNLISTED PULMONARY SVC/PX: CPT

## 2021-07-17 PROCEDURE — 94669 MECHANICAL CHEST WALL OSCILL: CPT

## 2021-07-17 PROCEDURE — 85652 RBC SED RATE AUTOMATED: CPT | Performed by: INTERNAL MEDICINE

## 2021-07-17 PROCEDURE — 99233 SBSQ HOSP IP/OBS HIGH 50: CPT | Performed by: INTERNAL MEDICINE

## 2021-07-17 PROCEDURE — 97530 THERAPEUTIC ACTIVITIES: CPT

## 2021-07-17 PROCEDURE — 85025 COMPLETE CBC W/AUTO DIFF WBC: CPT | Performed by: INTERNAL MEDICINE

## 2021-07-17 PROCEDURE — 25010000002 ENOXAPARIN PER 10 MG: Performed by: INTERNAL MEDICINE

## 2021-07-17 RX ORDER — BUMETANIDE 1 MG/1
1 TABLET ORAL DAILY
Status: DISCONTINUED | OUTPATIENT
Start: 2021-07-18 | End: 2021-07-22 | Stop reason: HOSPADM

## 2021-07-17 RX ADMIN — COLCHICINE 1.2 MG: 0.6 TABLET, FILM COATED ORAL at 08:22

## 2021-07-17 RX ADMIN — FLUTICASONE PROPIONATE 2 SPRAY: 50 SPRAY, METERED NASAL at 20:06

## 2021-07-17 RX ADMIN — DOXYCYCLINE 100 MG: 100 INJECTION, POWDER, LYOPHILIZED, FOR SOLUTION INTRAVENOUS at 08:24

## 2021-07-17 RX ADMIN — HYDROCODONE BITARTRATE AND ACETAMINOPHEN 1 TABLET: 5; 325 TABLET ORAL at 04:46

## 2021-07-17 RX ADMIN — IPRATROPIUM BROMIDE AND ALBUTEROL SULFATE 3 ML: 2.5; .5 SOLUTION RESPIRATORY (INHALATION) at 04:15

## 2021-07-17 RX ADMIN — Medication 1 CAPSULE: at 08:23

## 2021-07-17 RX ADMIN — BUMETANIDE 2 MG: 0.25 INJECTION, SOLUTION INTRAMUSCULAR; INTRAVENOUS at 05:07

## 2021-07-17 RX ADMIN — ASPIRIN 81 MG CHEWABLE TABLET 81 MG: 81 TABLET CHEWABLE at 08:23

## 2021-07-17 RX ADMIN — COLCHICINE 1.2 MG: 0.6 TABLET, FILM COATED ORAL at 20:05

## 2021-07-17 RX ADMIN — TAZOBACTAM SODIUM AND PIPERACILLIN SODIUM 4.5 G: 500; 4 INJECTION, SOLUTION INTRAVENOUS at 21:33

## 2021-07-17 RX ADMIN — IPRATROPIUM BROMIDE AND ALBUTEROL SULFATE 3 ML: 2.5; .5 SOLUTION RESPIRATORY (INHALATION) at 23:06

## 2021-07-17 RX ADMIN — METHYLPREDNISOLONE SODIUM SUCCINATE 40 MG: 40 INJECTION, POWDER, FOR SOLUTION INTRAMUSCULAR; INTRAVENOUS at 21:32

## 2021-07-17 RX ADMIN — IPRATROPIUM BROMIDE AND ALBUTEROL SULFATE 3 ML: 2.5; .5 SOLUTION RESPIRATORY (INHALATION) at 07:56

## 2021-07-17 RX ADMIN — SODIUM CHLORIDE, PRESERVATIVE FREE 10 ML: 5 INJECTION INTRAVENOUS at 08:22

## 2021-07-17 RX ADMIN — METHYLPREDNISOLONE SODIUM SUCCINATE 40 MG: 40 INJECTION, POWDER, FOR SOLUTION INTRAMUSCULAR; INTRAVENOUS at 05:07

## 2021-07-17 RX ADMIN — ENOXAPARIN SODIUM 30 MG: 30 INJECTION SUBCUTANEOUS at 20:05

## 2021-07-17 RX ADMIN — CARVEDILOL 12.5 MG: 12.5 TABLET, FILM COATED ORAL at 08:23

## 2021-07-17 RX ADMIN — ENOXAPARIN SODIUM 30 MG: 30 INJECTION SUBCUTANEOUS at 08:22

## 2021-07-17 RX ADMIN — TAZOBACTAM SODIUM AND PIPERACILLIN SODIUM 4.5 G: 500; 4 INJECTION, SOLUTION INTRAVENOUS at 14:41

## 2021-07-17 RX ADMIN — PANTOPRAZOLE SODIUM 40 MG: 40 TABLET, DELAYED RELEASE ORAL at 05:07

## 2021-07-17 RX ADMIN — TAZOBACTAM SODIUM AND PIPERACILLIN SODIUM 4.5 G: 500; 4 INJECTION, SOLUTION INTRAVENOUS at 05:07

## 2021-07-17 RX ADMIN — Medication 2 SPRAY: at 08:24

## 2021-07-17 RX ADMIN — TEMAZEPAM 15 MG: 15 CAPSULE ORAL at 22:18

## 2021-07-17 RX ADMIN — SODIUM CHLORIDE, PRESERVATIVE FREE 10 ML: 5 INJECTION INTRAVENOUS at 20:05

## 2021-07-17 RX ADMIN — Medication 2 SPRAY: at 20:06

## 2021-07-17 RX ADMIN — FLUTICASONE PROPIONATE 2 SPRAY: 50 SPRAY, METERED NASAL at 08:24

## 2021-07-17 RX ADMIN — IPRATROPIUM BROMIDE AND ALBUTEROL SULFATE 3 ML: 2.5; .5 SOLUTION RESPIRATORY (INHALATION) at 15:03

## 2021-07-17 RX ADMIN — ALLOPURINOL 100 MG: 100 TABLET ORAL at 08:23

## 2021-07-17 RX ADMIN — METHYLPREDNISOLONE SODIUM SUCCINATE 40 MG: 40 INJECTION, POWDER, FOR SOLUTION INTRAMUSCULAR; INTRAVENOUS at 14:41

## 2021-07-17 RX ADMIN — DOXYCYCLINE 100 MG: 100 INJECTION, POWDER, LYOPHILIZED, FOR SOLUTION INTRAVENOUS at 20:06

## 2021-07-17 RX ADMIN — HYDROCODONE BITARTRATE AND ACETAMINOPHEN 1 TABLET: 5; 325 TABLET ORAL at 11:07

## 2021-07-17 RX ADMIN — IPRATROPIUM BROMIDE AND ALBUTEROL SULFATE 3 ML: 2.5; .5 SOLUTION RESPIRATORY (INHALATION) at 19:58

## 2021-07-17 RX ADMIN — CARVEDILOL 12.5 MG: 12.5 TABLET, FILM COATED ORAL at 20:05

## 2021-07-17 RX ADMIN — HYDROCODONE BITARTRATE AND ACETAMINOPHEN 1 TABLET: 5; 325 TABLET ORAL at 17:02

## 2021-07-17 NOTE — PLAN OF CARE
Goal Outcome Evaluation:  Plan of Care Reviewed With: patient, spouse        Progress: improving   Added doxycyline IV. One time dose of 125 mg Solumedrol IV given, 2 mg IV bumex given q 6 and cleaned out nasal passages- able to wean Hi Flow NC to 40 L flow and 55% fio2. Pt walked in halls three times with a 100% NRB mask 300-400 ft each time without difficulty. O2 saturation have been greater than 88%. Total urine output for shift- 4325 mls. Pt eating 100% of all meals.

## 2021-07-17 NOTE — PLAN OF CARE
Goal Outcome Evaluation:  Plan of Care Reviewed With: patient        Progress: improving  Outcome Summary: Pt is improving, SaO2 remained >90% while ambulating 300' on NRB. However, pt blood pressure was elevated post mobility, asymptomatic but nsg notified. Pt will cont to benefit from skilled IP PT services to promote increased activity tolerance and return to PLOF.

## 2021-07-17 NOTE — PROGRESS NOTES
INTENSIVIST / PULMONARY FOLLOW UP NOTE     Hospital:  LOS: 5 days   Mr. Norebrto Keita, 45 y.o. male is followed for:     Acute on chronic respiratory failure with hypoxia and hypercapnia (CMS/HCC)    Hypertension, essential    Class 3 severe obesity due to excess calories with serious comorbidity and body mass index (BMI) of 40.0 to 44.9 in adult (CMS/HCC)    Acute pulmonary edema (CMS/HCC)    Hypertensive emergency    Obstructive sleep apnea    Obesity hypoventilation syndrome (CMS/HCC) -suspected    Acute on chronic diastolic congestive heart failure (CMS/HCC)    Mixed hyperlipidemia    Prediabetes          SUBJECTIVE   Finally able to wean FiO2 in past 24 hours    The patient's relevant past medical, surgical, family, and social history were reviewed    Allergies and medications were reviewed    ROS:  Per subjective, all other systems were reviewed and were negative        OBJECTIVE     Vital Sign Min/Max for last 24 hours:  Temp  Min: 98 °F (36.7 °C)  Max: 99 °F (37.2 °C)   BP  Min: 109/75  Max: 161/98   Pulse  Min: 74  Max: 100   Resp  Min: 15  Max: 24   SpO2  Min: 87 %  Max: 98 %   No data recorded     Physical Exam:  General Appearance:  Conversant, in no distress  Eyes:  No scleral icterus or pallor, pupils normal  Ears, Nose, Mouth, Throat:  Atraumatic, oropharynx clear  Neck:  Trachea midline, thyroid normal  Respiratory: clear to auscultation bilaterally, normal effort, no tenderness to palpation  Cardiovascular:  Regular rate and rhythm, no murmurs, no edema, no thrill  Gastrointestinal:  Soft, nontender, nondistended, no hepatosplenomegaly  Skin:  Normal temperature, no rash  Psychiatric:  Alert and oriented x 3, normal judgement and insight  Neuro:  No new focal neurologic deficits observed  MSK:  Right elbow warm and tender    Telemetry:              Hemodynamics:   CVP:     PAP:     PAOP:     CO:     CI:     SVI:     SVR:       SpO2: 92 % SpO2  Min: 87 %  Max: 98 %   Device:      Flow Rate:   No  data recorded     Mechanical Ventilator Settings:                                         Intake/Ouptut 24 hrs (7:00AM - 6:59 AM)  Intake & Output (last 3 days)       07/14 0701 - 07/15 0700 07/15 0701 - 07/16 0700 07/16 0701 - 07/17 0700 07/17 0701 - 07/18 0700    P.O. 1200 1380 840 240    I.V. (mL/kg) 102.9 (0.6) 30 (0.2) 16 (0.1)     IV Piggyback 413.1 396 500     Total Intake(mL/kg) 1716 (10.3) 1806 (10.3) 1356 (8) 240 (1.4)    Urine (mL/kg/hr) 4200 (1) 3225 (0.8) 5275 (1.3) 875 (1.3)    Stool 0       Total Output 4200 3225 5275 875    Net -2484 -1419 -3919 -635            Urine Unmeasured Occurrence 1 x       Stool Unmeasured Occurrence 1 x             Lines, Drains & Airways    Active LDAs     Name:   Placement date:   Placement time:   Site:   Days:    Peripheral IV 07/12/21 1553 Right Antecubital   07/12/21    1553    Antecubital   less than 1    Peripheral IV 07/12/21 2300 Left Antecubital   07/12/21    2300    Antecubital   less than 1                Hematology:  Results from last 7 days   Lab Units 07/17/21  0119 07/16/21  0402 07/15/21  0602 07/14/21  0330 07/12/21  1552   WBC 10*3/mm3 13.85* 11.71* 9.56 10.60 10.86*   HEMOGLOBIN g/dL 17.6 17.3 17.2 16.9 16.8   HEMATOCRIT % 58.6* 58.9* 56.9* 55.5* 56.9*   PLATELETS 10*3/mm3 273 250 205 212 219     Electrolytes, Magnesium and Phosphorus:  Results from last 7 days   Lab Units 07/17/21  0119 07/16/21  0402 07/15/21  0603 07/14/21  1527 07/14/21  0330 07/13/21  1655 07/13/21  0355 07/12/21  1955 07/12/21  1552   SODIUM mmol/L 138 134* 136  --  137 136 138  --  136   CHLORIDE mmol/L 95* 92* 93*  --  91* 92* 96*  --  97*   POTASSIUM mmol/L 4.4 4.3 3.7 3.8 3.2* 3.8 4.3  4.2 4.2 4.6   CO2 mmol/L 29.0 32.0* 33.0*  --  33.0* 33.0* 26.0  --  33.0*   MAGNESIUM mg/dL 2.1 2.1 2.0  --  2.0 2.0 1.7 2.0  --    PHOSPHORUS mg/dL 4.2 3.4 3.9  --  3.0 2.7 2.9  --   --      Renal:  Results from last 7 days   Lab Units 07/17/21  0119 07/16/21  0402 07/15/21  0603  07/14/21  0330 07/13/21  1655 07/13/21  0355 07/12/21  1552   CREATININE mg/dL 0.92 0.82 0.88 1.00 0.91 0.87 0.72*   BUN mg/dL 22* 20 15 13 10 9 10     Estimated Creatinine Clearance: 168.1 mL/min (by C-G formula based on SCr of 0.92 mg/dL).  Hepatic:  Results from last 7 days   Lab Units 07/17/21  0119 07/16/21  0402 07/15/21  0603 07/14/21  0330 07/12/21  1552   ALK PHOS U/L 117 124* 110 123* 131*   BILIRUBIN mg/dL 1.2 1.8* 2.9* 2.9* 1.5*   ALT (SGPT) U/L 83* 57* 28 43* 73*   AST (SGOT) U/L 54* 44* 15 21 34     Arterial Blood Gases:  Results from last 7 days   Lab Units 07/14/21  0307 07/12/21  1940 07/12/21  1724 07/12/21  1605   PH, ARTERIAL pH units 7.487* 7.340* 7.280* 7.325*   PCO2, ARTERIAL mm Hg 54.9* 69.6* 77.9* 68.9*   PO2 ART mm Hg 62.9* 54.9* 83.7 61.3*   FIO2 % 100 100 90 40             Lab Results   Component Value Date    LACTATE 1.0 07/15/2021       Relevant imaging studies and labs from 07/17/21 were reviewed and interpreted by me    Medications (drips):       allopurinol, 100 mg, Oral, Daily  aspirin, 81 mg, Oral, Daily  carvedilol, 12.5 mg, Oral, Q12H  colchicine, 1.2 mg, Oral, BID  doxycycline, 100 mg, Intravenous, Q12H  enoxaparin, 30 mg, Subcutaneous, Q12H  fluticasone, 2 spray, Each Nare, BID  ipratropium-albuterol, 3 mL, Nebulization, Q4H - RT  lactobacillus acidophilus, 1 capsule, Oral, Daily  methylPREDNISolone sodium succinate, 40 mg, Intravenous, Q8H  oxymetazoline, 2 spray, Each Nare, BID  pantoprazole, 40 mg, Oral, Q AM  pharmacy consult - [place], , Does not apply, Daily  piperacillin-tazobactam, 4.5 g, Intravenous, Q8H  sodium chloride, 10 mL, Intravenous, Q12H        Assessment/Plan   IMPRESSION / PLAN     Inpatient Problem List:  45 y.o.male:  Active Hospital Problems    Diagnosis    • **Acute on chronic respiratory failure with hypoxia and hypercapnia (CMS/HCC)    • Acute on chronic diastolic congestive heart failure (CMS/HCC)      · Echo 7-12-21: Left ventricular wall thickness is  consistent with moderate to severe concentric hypertrophy. Left ventricular ejection fraction appears to be 51 - 55%.     • Mixed hyperlipidemia    • Prediabetes    • Acute pulmonary edema (CMS/HCC)    • Hypertensive emergency    • Obstructive sleep apnea    • Obesity hypoventilation syndrome (CMS/HCC) -suspected    • Class 3 severe obesity due to excess calories with serious comorbidity and body mass index (BMI) of 40.0 to 44.9 in adult (CMS/HCC)    • Hypertension, essential         Impression:  45 y.o.male with relevant PMH of BMI 48, likely LEILANI / OHS, HTN, recently ran out of medication admitted 7/12/2021 with shortness of breath, LE edema, and uncontrolled HTN.  Found to have /125 and Severe Acute Hypoxemic & Hypercapnic Respiratory Failure requiring 100% Bipap.  CTA neg for PE, but did show bibasilar atelectasis / pneumonia, thickened LV myocardium, and enlarged PA.  No recent vomiting but has had severe reflux.    Overall appears to be a combination of Acute Diastolic HF and Bibasilar PNA / Atelectasis.  Suspect some degree of chronic Hypercapnic Respiratory Failure as pH nearly normal with PCO2 70 and BE +8.1.  Echo w/ EF 51-55%, mod to severe concentric hypertrophy, mild RV dilation, normal RVSP.  Patient Mother has h/o HOCM.    Despite aggressive treatment patient remains profound hypoxemic, but breathing comfortably.  No shunt on Echo.  No obvious occupational, recreational, or infectious exposures.    Plan:  Severe Acute Hypoxemic Respiratory Failure  AoC Hypercapnic Respiratory Failure  LEILANI  OHS  Acute Diastolic HF  Hypertensive Emergency  Bi-basilar Pneumonia / Atelectasis  Gout Flare  Elevated LFTs    Bipap hs and prn.  Keep on HFNC - wean FiO2 as able to keep sat >88.  Finally able to wean FiO2 on HFNC to 45%.  Start po maintenance bumex - looks euvolemic.  Empiric pip-tazo for pneumonia.  Added doxy for atypical coverage 7/16.  Duoneb w/ metaneb for pulmonary toilet. Empiric reflux treatment w/  PPI.  D/c'd vanco - MRSA PCR negative.  Tight BP control.  Coreg for DHF, mod to severe concentric LVH.  Family h/o HOCM.  Hold ARB in setting of aggressive diuresis / contrast / IV vanco.      If patient ends up having LHC would benefit from having RHC as well to exclude PAH.    Continue colchicine and allopurinol for gout flare.  Started IV solumedrol 7/15.      Inflammatory markers are elevated.  Increased dose of solumedrol 7/16 and patient appears to have had a dramatic improvement the past 24 hours.  Follow up auto-immune labs, fungal serologies, respiratory viral PCR, Tick panel.    Replace electrolytes prn    Appreciate Cardiology help.    DVT / PUD prophylaxis    Nutrition - Diet Regular; Cardiac     PT/OT    Plan of care and goals reviewed with multidisciplinary team at daily rounds    High Risk secondary to severe hypoxemic / hypercapnic respiratory failure, Acute DHF, Hypertensive Emergency, very high risk for decompensation / mechanical ventilation / etc.    Critical Care time spent in direct patient care: 30 minutes (excluding procedure time, if applicable) including high complexity decision making to assess, manipulate, and support vital organ system failure in this individual who has impairment of one or more vital organ systems such that there is a high probability of imminent or life threatening deterioration in the patient’s condition.       Tato Whitaker MD  Intensive Care Medicine  07/17/21 10:50 EDT

## 2021-07-17 NOTE — THERAPY TREATMENT NOTE
Patient Name: Norberto Keita  : 1975    MRN: [id number]                              Today's Date: 2021       Admit Date: 2021    Visit Dx:     ICD-10-CM ICD-9-CM   1. Acute respiratory failure with hypoxia and hypercarbia (CMS/HCC)  J96.01 518.81    J96.02      Patient Active Problem List   Diagnosis   • Gout of big toe   • Generalized anxiety disorder   • Hypertension, essential   • Reduced libido   • Hypogonadism in male   • Class 3 severe obesity due to excess calories with serious comorbidity and body mass index (BMI) of 40.0 to 44.9 in adult (CMS/HCC)   • Acute on chronic respiratory failure with hypoxia and hypercapnia (CMS/HCC)   • Acute pulmonary edema (CMS/HCC)   • Hypertensive emergency   • Obstructive sleep apnea   • Obesity hypoventilation syndrome (CMS/[facility]) -suspected   • Acute on chronic diastolic congestive heart failure (CMS/HCC)   • Mixed hyperlipidemia   • Prediabetes     Past Medical History:   Diagnosis Date   • Sacroiliitis (CMS/[facility])      Past Surgical History:   Procedure Laterality Date   • HYDROCELE EXCISION / REPAIR       [facility] [place] Vaginalis Excision of Hydrocele   • KNEE SURGERY Left    • VASECTOMY       General Information     Row Name 21 1308          Physical Therapy Time and Intention    Document Type  therapy note (daily note)  -     Mode of Treatment  physical therapy  -     Row Name 21 1308          General Information    Patient Profile Reviewed  yes  -MEE     Existing Precautions/Restrictions  oxygen therapy device and L/min;other (see comments) NRB for mobility  -     Row Name 21 1308          Cognition    Orientation Status (Cognition)  oriented x 4  -     Row Name 21 1308          Safety Issues, Functional Mobility    Impairments Affecting Function (Mobility)  shortness of breath;endurance/activity tolerance  -       User Key  (r) = Recorded By, (t) = Taken By, (c) = Cosigned By    Initials Name Provider Type    MEE  Lashaun Mckeon, PT Physical Therapist        Mobility     Row Name 07/17/21 1309          Bed Mobility    Comment (Bed Mobility)  Deferred, pt UIC  -MEE     Row Name 07/17/21 1309          Transfers    Comment (Transfers)  Independent with sit to stands.  -MEE     Row Name 07/17/21 1309          Sit-Stand Transfer    Sit-Stand [place] (Transfers)  independent  -MEE     Row Name 07/17/21 1309          Gait/Stairs (Locomotion)    [place] Level (Gait)  supervision;standby assist  -MEE     Assistive Device (Gait)  other (see comments) Pushed telemetry monitor  -MEE     Distance in Feet (Gait)  300  -MEE     Deviations/Abnormal Patterns (Gait)  base of support, wide  -MEE     Bilateral Gait Deviations  heel strike decreased  -MEE     Comment (Gait/Stairs)  Pt demo improvement, SaO2 stayed above 90% on NRB. No rest breaks needed. Blood pressure increaed post ambulation but pt asymptomatic, nsg notified.  -MEE       User Key  (r) = Recorded By, (t) = Taken By, (c) = Cosigned By    Initials Name Provider Type    Lashaun Bueno, MAXIMO Physical Therapist        Obj/Interventions     Row Name 07/17/21 1311          Balance    Balance Assessment  sitting static balance;sitting dynamic balance;standing static balance;standing dynamic balance  -MEE     Static Sitting Balance  WNL;unsupported;sitting in chair  -MEE     Dynamic Sitting Balance  WFL;unsupported;sitting in chair  -MEE     Static Standing Balance  WFL;unsupported;standing  -MEE     Dynamic Standing Balance  WFL;unsupported;standing  -MEE     Balance Interventions  sitting;standing;sit to stand  -MEE       User Key  (r) = Recorded By, (t) = Taken By, (c) = Cosigned By    Initials Name Provider Type    Lashaun Bueno, PT Physical Therapist        Goals/Plan    No documentation.       Clinical Impression     Row Name 07/17/21 1312          Pain    Additional Documentation  Pain Scale: Numbers Pre/Post-Treatment (Group)  -MEE     Row Name 07/17/21 1312          Pain  Scale: Numbers Pre/Post-Treatment    Pretreatment Pain Rating  0/10 - no pain  -MEE     Posttreatment Pain Rating  0/10 - no pain  -MEE     Row Name 07/17/21 1312          Plan of Care Review    Plan of Care Reviewed With  patient  -MEE     Progress  improving  -MEE     Outcome Summary  Pt is improving, SaO2 remained >90% while ambulating 300' on NRB. However, pt blood pressure was elevated post mobility, asymptomatic but nsg notified. Pt will cont to benefit from skilled IP PT services to promote increased activity tolerance and return to PLOF.  -MEE     Row Name 07/17/21 1312          Therapy Assessment/Plan (PT)    Patient/Family Therapy Goals Statement (PT)  Return home.  -MEE     Rehab Potential (PT)  good, to achieve stated therapy goals  -MEE     Criteria for Skilled Interventions Met (PT)  yes;meets criteria;skilled treatment is necessary  -MEE     Row Name 07/17/21 1312          Vital Signs    Pre Systolic BP Rehab  126  -MEE     Pre Treatment Diastolic BP  89  -MEE     Post Systolic BP Rehab  141  -MEE     Post Treatment Diastolic BP  121  -MEE     Pretreatment Heart Rate (beats/min)  84  -MEE     Posttreatment Heart Rate (beats/min)  86  -MEE     Pre SpO2 (%)  92  -MEE     O2 Delivery Pre Treatment  hi-flow  -MEE     Intra SpO2 (%)  90  -MEE     O2 Delivery Intra Treatment  non-rebreather  -MEE     Post SpO2 (%)  92  -MEE     O2 Delivery Post Treatment  hi-flow  -MEE     Pre Patient Position  Sitting  -MEE     Intra Patient Position  Standing  -MEE     Post Patient Position  Sitting  -MEE     Row Name 07/17/21 1312          Positioning and Restraints    Pre-Treatment Position  sitting in chair/recliner  -MEE     Post Treatment Position  chair  -MEE     In Chair  notified nsg;sitting;call light within reach;encouraged to call for assist  -MEE       User Key  (r) = Recorded By, (t) = Taken By, (c) = Cosigned By    Initials Name Provider Type    Lashaun Bueno, PT Physical Therapist        Outcome Measures     Row Name 07/17/21  1314          How much help from another person do you currently need...    Turning from your back to your side while in flat bed without using bedrails?  4  -MEE     Moving from lying on back to sitting on the side of a flat bed without bedrails?  4  -MEE     Moving to and from a bed to a chair (including a wheelchair)?  4  -MEE     Standing up from a chair using your arms (e.g., wheelchair, bedside chair)?  4  -MEE     Climbing 3-5 steps with a railing?  3  -MEE     To walk in hospital room?  3  -MEE     AM-PAC 6 Clicks Score (PT)  22  -MEE       User Key  (r) = Recorded By, (t) = Taken By, (c) = Cosigned By    Initials Name Provider Type    Lashaun Bueno, PT Physical Therapist        Physical Therapy Education                 Title: PT OT SLP Therapies (In Progress)     Topic: Physical Therapy (In Progress)     Point: Mobility training (Done)     Learning Progress Summary           Patient Acceptance, E, VU,NR by MEE at 7/17/2021 1315    Acceptance, E,TB, VU,NR by EMEKA at 7/16/2021 1045                   Point: Home exercise program (Not Started)     Learner Progress:  Not documented in this visit.          Point: Body mechanics (Done)     Learning Progress Summary           Patient Acceptance, E, VU,NR by MEE at 7/17/2021 1315    Acceptance, E,TB, VU,NR by EMEKA at 7/16/2021 1045                   Point: Precautions (Done)     Learning Progress Summary           Patient Acceptance, E, VU,NR by MEE at 7/17/2021 1315    Acceptance, E,TB, VU,NR by EMEKA at 7/16/2021 1045                               User Key     Initials Effective Dates Name Provider Type Discipline    MEE 06/16/21 -  Lashaun Mckeon, PT Physical Therapist PT    EMEKA 11/10/20 -  Zulema Mills, PT Physical Therapist PT              PT Recommendation and Plan     Plan of Care Reviewed With: patient  Progress: improving  Outcome Summary: Pt is improving, SaO2 remained >90% while ambulating 300' on NRB. However, pt blood pressure was elevated post mobility,  asymptomatic but nsg notified. Pt will cont to benefit from skilled IP PT services to promote increased activity tolerance and return to PLOF.     Time Calculation:   PT Charges     Row Name 07/17/21 1315             Time Calculation    Start Time  1125  -MEE      PT Received On  07/17/21  -MEE      PT Goal Re-Cert Due Date  07/26/21  -MEE         Time Calculation- PT    Total Timed Code Minutes- PT  23 minute(s)  -MEE         Timed Charges    93823 - PT Therapeutic Activity Minutes  23  -MEE         Total Minutes    Timed Charges Total Minutes  23  -MEE       Total Minutes  23  -MEE        User Key  (r) = Recorded By, (t) = Taken By, (c) = Cosigned By    Initials Name Provider Type    Lashaun Bueno, PT Physical Therapist        Therapy Charges for Today     Code Description Service Date Service Provider Modifiers Qty    62560867948 HC PT THERAPEUTIC ACT EA 15 MIN 7/17/2021 Lashaun Mckeon, PT GP 2          PT G-Codes  Outcome Measure Options: AM-PAC 6 Clicks Daily Activity (OT)  AM-PAC 6 Clicks Score (PT): 22  AM-PAC 6 Clicks Score (OT): 19    Lashaun Mckeon PT  7/17/2021

## 2021-07-17 NOTE — PLAN OF CARE
Goal Outcome Evaluation:  Plan of Care Reviewed With: patient, spouse        Progress: improving   Pt on 40% at 40 LPM. Sats remaining greater than 88%. Continues to receive solumedrol. Will begin to get PO diuretics in the am. UOP good. Pt ambulated in bell 300 feet with PT on 100% nonrebreather. Pt has received PO norco for pain q6 hours. Good PO intake. Eats 100% of meals. Wife and family updated at bedside.

## 2021-07-17 NOTE — PLAN OF CARE
Goal Outcome Evaluation:  Plan of Care Reviewed With: patient        Progress: no change  Outcome Summary: Patient remained on 40L 55% HFNC until ~2330 when restoril was given, he was then placed on bipap.  FiO2 was unable to be weaned on bipap from 100% and still only able to maintain sats >87%, no active distress noted.  APRN notified and advised as long as he was not in distress to monitor.  Sats remained ~87-91% until 0415 when he was placed back on HFNC and ambulated to chair.  Frequent PAC's noted AM labs obtained early, no issues with electrolytes.  Other labs noted.  UOP > 1L.

## 2021-07-18 LAB
ALBUMIN SERPL-MCNC: 3.6 G/DL (ref 3.5–5.2)
ALBUMIN/GLOB SERPL: 1 G/DL
ALP SERPL-CCNC: 102 U/L (ref 39–117)
ALT SERPL W P-5'-P-CCNC: 77 U/L (ref 1–41)
ANION GAP SERPL CALCULATED.3IONS-SCNC: 11 MMOL/L (ref 5–15)
AST SERPL-CCNC: 34 U/L (ref 1–40)
BASOPHILS # BLD AUTO: 0.01 10*3/MM3 (ref 0–0.2)
BASOPHILS NFR BLD AUTO: 0.1 % (ref 0–1.5)
BILIRUB SERPL-MCNC: 0.9 MG/DL (ref 0–1.2)
BUN SERPL-MCNC: 24 MG/DL (ref 6–20)
BUN/CREAT SERPL: 31.2 (ref 7–25)
CALCIUM SPEC-SCNC: 9.3 MG/DL (ref 8.6–10.5)
CHLORIDE SERPL-SCNC: 96 MMOL/L (ref 98–107)
CO2 SERPL-SCNC: 29 MMOL/L (ref 22–29)
CREAT SERPL-MCNC: 0.77 MG/DL (ref 0.76–1.27)
CRP SERPL-MCNC: 1.89 MG/DL (ref 0–0.5)
DEPRECATED RDW RBC AUTO: 49.1 FL (ref 37–54)
EOSINOPHIL # BLD AUTO: 0 10*3/MM3 (ref 0–0.4)
EOSINOPHIL NFR BLD AUTO: 0 % (ref 0.3–6.2)
ERYTHROCYTE [DISTWIDTH] IN BLOOD BY AUTOMATED COUNT: 15.9 % (ref 12.3–15.4)
ERYTHROCYTE [SEDIMENTATION RATE] IN BLOOD: 39 MM/HR (ref 0–15)
GFR SERPL CREATININE-BSD FRML MDRD: 110 ML/MIN/1.73
GLOBULIN UR ELPH-MCNC: 3.5 GM/DL
GLUCOSE BLDC GLUCOMTR-MCNC: 112 MG/DL (ref 70–130)
GLUCOSE BLDC GLUCOMTR-MCNC: 163 MG/DL (ref 70–130)
GLUCOSE BLDC GLUCOMTR-MCNC: 176 MG/DL (ref 70–130)
GLUCOSE BLDC GLUCOMTR-MCNC: 198 MG/DL (ref 70–130)
GLUCOSE SERPL-MCNC: 200 MG/DL (ref 65–99)
HCT VFR BLD AUTO: 57.7 % (ref 37.5–51)
HGB BLD-MCNC: 17.2 G/DL (ref 13–17.7)
IMM GRANULOCYTES # BLD AUTO: 0.06 10*3/MM3 (ref 0–0.05)
IMM GRANULOCYTES NFR BLD AUTO: 0.6 % (ref 0–0.5)
LYMPHOCYTES # BLD AUTO: 0.67 10*3/MM3 (ref 0.7–3.1)
LYMPHOCYTES NFR BLD AUTO: 6.3 % (ref 19.6–45.3)
MAGNESIUM SERPL-MCNC: 2.1 MG/DL (ref 1.6–2.6)
MCH RBC QN AUTO: 26.8 PG (ref 26.6–33)
MCHC RBC AUTO-ENTMCNC: 29.8 G/DL (ref 31.5–35.7)
MCV RBC AUTO: 89.9 FL (ref 79–97)
MONOCYTES # BLD AUTO: 0.67 10*3/MM3 (ref 0.1–0.9)
MONOCYTES NFR BLD AUTO: 6.3 % (ref 5–12)
NEUTROPHILS NFR BLD AUTO: 86.7 % (ref 42.7–76)
NEUTROPHILS NFR BLD AUTO: 9.25 10*3/MM3 (ref 1.7–7)
NRBC BLD AUTO-RTO: 0 /100 WBC (ref 0–0.2)
PHOSPHATE SERPL-MCNC: 4.5 MG/DL (ref 2.5–4.5)
PLATELET # BLD AUTO: 263 10*3/MM3 (ref 140–450)
PMV BLD AUTO: 10.9 FL (ref 6–12)
POTASSIUM SERPL-SCNC: 4.3 MMOL/L (ref 3.5–5.2)
PROCALCITONIN SERPL-MCNC: 0.05 NG/ML (ref 0–0.25)
PROT SERPL-MCNC: 7.1 G/DL (ref 6–8.5)
RBC # BLD AUTO: 6.42 10*6/MM3 (ref 4.14–5.8)
SODIUM SERPL-SCNC: 136 MMOL/L (ref 136–145)
WBC # BLD AUTO: 10.66 10*3/MM3 (ref 3.4–10.8)

## 2021-07-18 PROCEDURE — 82962 GLUCOSE BLOOD TEST: CPT

## 2021-07-18 PROCEDURE — 85652 RBC SED RATE AUTOMATED: CPT | Performed by: INTERNAL MEDICINE

## 2021-07-18 PROCEDURE — 84100 ASSAY OF PHOSPHORUS: CPT | Performed by: INTERNAL MEDICINE

## 2021-07-18 PROCEDURE — 97530 THERAPEUTIC ACTIVITIES: CPT

## 2021-07-18 PROCEDURE — 63710000001 INSULIN DETEMIR PER 5 UNITS: Performed by: INTERNAL MEDICINE

## 2021-07-18 PROCEDURE — 94799 UNLISTED PULMONARY SVC/PX: CPT

## 2021-07-18 PROCEDURE — 80053 COMPREHEN METABOLIC PANEL: CPT | Performed by: INTERNAL MEDICINE

## 2021-07-18 PROCEDURE — 99291 CRITICAL CARE FIRST HOUR: CPT | Performed by: INTERNAL MEDICINE

## 2021-07-18 PROCEDURE — 25010000002 METHYLPREDNISOLONE PER 40 MG: Performed by: INTERNAL MEDICINE

## 2021-07-18 PROCEDURE — 63710000001 INSULIN LISPRO (HUMAN) PER 5 UNITS: Performed by: INTERNAL MEDICINE

## 2021-07-18 PROCEDURE — 84145 PROCALCITONIN (PCT): CPT | Performed by: INTERNAL MEDICINE

## 2021-07-18 PROCEDURE — 25010000002 PIPERACILLIN SOD-TAZOBACTAM PER 1 G: Performed by: INTERNAL MEDICINE

## 2021-07-18 PROCEDURE — 97110 THERAPEUTIC EXERCISES: CPT

## 2021-07-18 PROCEDURE — 63710000001 INSULIN LISPRO (HUMAN) PER 5 UNITS: Performed by: NURSE PRACTITIONER

## 2021-07-18 PROCEDURE — 83735 ASSAY OF MAGNESIUM: CPT | Performed by: NURSE PRACTITIONER

## 2021-07-18 PROCEDURE — 85025 COMPLETE CBC W/AUTO DIFF WBC: CPT | Performed by: INTERNAL MEDICINE

## 2021-07-18 PROCEDURE — 25010000002 ENOXAPARIN PER 10 MG: Performed by: INTERNAL MEDICINE

## 2021-07-18 PROCEDURE — 86140 C-REACTIVE PROTEIN: CPT | Performed by: INTERNAL MEDICINE

## 2021-07-18 RX ORDER — DEXTROSE MONOHYDRATE 25 G/50ML
25 INJECTION, SOLUTION INTRAVENOUS
Status: DISCONTINUED | OUTPATIENT
Start: 2021-07-18 | End: 2021-07-22 | Stop reason: HOSPADM

## 2021-07-18 RX ORDER — BUMETANIDE 0.25 MG/ML
1 INJECTION INTRAMUSCULAR; INTRAVENOUS ONCE
Status: COMPLETED | OUTPATIENT
Start: 2021-07-18 | End: 2021-07-18

## 2021-07-18 RX ORDER — NICOTINE POLACRILEX 4 MG
15 LOZENGE BUCCAL
Status: DISCONTINUED | OUTPATIENT
Start: 2021-07-18 | End: 2021-07-22 | Stop reason: HOSPADM

## 2021-07-18 RX ADMIN — PANTOPRAZOLE SODIUM 40 MG: 40 TABLET, DELAYED RELEASE ORAL at 05:25

## 2021-07-18 RX ADMIN — INSULIN LISPRO 2 UNITS: 100 INJECTION, SOLUTION INTRAVENOUS; SUBCUTANEOUS at 17:01

## 2021-07-18 RX ADMIN — ACETAMINOPHEN 650 MG: 325 TABLET ORAL at 18:28

## 2021-07-18 RX ADMIN — ENOXAPARIN SODIUM 30 MG: 30 INJECTION SUBCUTANEOUS at 20:08

## 2021-07-18 RX ADMIN — COLCHICINE 1.2 MG: 0.6 TABLET, FILM COATED ORAL at 20:09

## 2021-07-18 RX ADMIN — SODIUM CHLORIDE, PRESERVATIVE FREE 10 ML: 5 INJECTION INTRAVENOUS at 08:05

## 2021-07-18 RX ADMIN — BUMETANIDE 1 MG: 0.25 INJECTION, SOLUTION INTRAMUSCULAR; INTRAVENOUS at 09:57

## 2021-07-18 RX ADMIN — TAZOBACTAM SODIUM AND PIPERACILLIN SODIUM 4.5 G: 500; 4 INJECTION, SOLUTION INTRAVENOUS at 05:25

## 2021-07-18 RX ADMIN — DOXYCYCLINE 100 MG: 100 INJECTION, POWDER, LYOPHILIZED, FOR SOLUTION INTRAVENOUS at 20:09

## 2021-07-18 RX ADMIN — TEMAZEPAM 15 MG: 15 CAPSULE ORAL at 23:26

## 2021-07-18 RX ADMIN — METHYLPREDNISOLONE SODIUM SUCCINATE 40 MG: 40 INJECTION, POWDER, FOR SOLUTION INTRAMUSCULAR; INTRAVENOUS at 05:25

## 2021-07-18 RX ADMIN — IPRATROPIUM BROMIDE AND ALBUTEROL SULFATE 3 ML: 2.5; .5 SOLUTION RESPIRATORY (INHALATION) at 19:22

## 2021-07-18 RX ADMIN — METHYLPREDNISOLONE SODIUM SUCCINATE 40 MG: 40 INJECTION, POWDER, FOR SOLUTION INTRAMUSCULAR; INTRAVENOUS at 21:58

## 2021-07-18 RX ADMIN — TAZOBACTAM SODIUM AND PIPERACILLIN SODIUM 4.5 G: 500; 4 INJECTION, SOLUTION INTRAVENOUS at 21:58

## 2021-07-18 RX ADMIN — HYDROCODONE BITARTRATE AND ACETAMINOPHEN 1 TABLET: 5; 325 TABLET ORAL at 11:38

## 2021-07-18 RX ADMIN — BUMETANIDE 1 MG: 1 TABLET ORAL at 08:03

## 2021-07-18 RX ADMIN — FLUTICASONE PROPIONATE 2 SPRAY: 50 SPRAY, METERED NASAL at 20:09

## 2021-07-18 RX ADMIN — INSULIN LISPRO 2 UNITS: 100 INJECTION, SOLUTION INTRAVENOUS; SUBCUTANEOUS at 23:26

## 2021-07-18 RX ADMIN — CARVEDILOL 12.5 MG: 12.5 TABLET, FILM COATED ORAL at 08:04

## 2021-07-18 RX ADMIN — ALLOPURINOL 100 MG: 100 TABLET ORAL at 08:04

## 2021-07-18 RX ADMIN — IPRATROPIUM BROMIDE AND ALBUTEROL SULFATE 3 ML: 2.5; .5 SOLUTION RESPIRATORY (INHALATION) at 07:59

## 2021-07-18 RX ADMIN — HYDROCODONE BITARTRATE AND ACETAMINOPHEN 1 TABLET: 5; 325 TABLET ORAL at 02:56

## 2021-07-18 RX ADMIN — FLUTICASONE PROPIONATE 2 SPRAY: 50 SPRAY, METERED NASAL at 08:05

## 2021-07-18 RX ADMIN — TAZOBACTAM SODIUM AND PIPERACILLIN SODIUM 4.5 G: 500; 4 INJECTION, SOLUTION INTRAVENOUS at 14:07

## 2021-07-18 RX ADMIN — ASPIRIN 81 MG CHEWABLE TABLET 81 MG: 81 TABLET CHEWABLE at 08:03

## 2021-07-18 RX ADMIN — SODIUM CHLORIDE, PRESERVATIVE FREE 10 ML: 5 INJECTION INTRAVENOUS at 20:08

## 2021-07-18 RX ADMIN — Medication 1 CAPSULE: at 08:03

## 2021-07-18 RX ADMIN — DOXYCYCLINE 100 MG: 100 INJECTION, POWDER, LYOPHILIZED, FOR SOLUTION INTRAVENOUS at 08:01

## 2021-07-18 RX ADMIN — INSULIN DETEMIR 10 UNITS: 100 INJECTION, SOLUTION SUBCUTANEOUS at 11:34

## 2021-07-18 RX ADMIN — HYDROCODONE BITARTRATE AND ACETAMINOPHEN 1 TABLET: 5; 325 TABLET ORAL at 20:14

## 2021-07-18 RX ADMIN — CARVEDILOL 12.5 MG: 12.5 TABLET, FILM COATED ORAL at 20:09

## 2021-07-18 RX ADMIN — ENOXAPARIN SODIUM 30 MG: 30 INJECTION SUBCUTANEOUS at 08:02

## 2021-07-18 RX ADMIN — IPRATROPIUM BROMIDE AND ALBUTEROL SULFATE 3 ML: 2.5; .5 SOLUTION RESPIRATORY (INHALATION) at 16:08

## 2021-07-18 RX ADMIN — METHYLPREDNISOLONE SODIUM SUCCINATE 40 MG: 40 INJECTION, POWDER, FOR SOLUTION INTRAMUSCULAR; INTRAVENOUS at 14:07

## 2021-07-18 RX ADMIN — INSULIN LISPRO 2 UNITS: 100 INJECTION, SOLUTION INTRAVENOUS; SUBCUTANEOUS at 11:34

## 2021-07-18 RX ADMIN — COLCHICINE 1.2 MG: 0.6 TABLET, FILM COATED ORAL at 08:03

## 2021-07-18 RX ADMIN — Medication 2 SPRAY: at 08:04

## 2021-07-18 RX ADMIN — Medication 2 SPRAY: at 20:09

## 2021-07-18 NOTE — PLAN OF CARE
Goal Outcome Evaluation:  Plan of Care Reviewed With: patient, spouse        Progress: improving   Pt on 50% and 40 LPM. Attempted to place pt on 6L NC, did not tolerate. Pt has ambulated in bell x2 600 ft both times. VS stable. Started on insulin due to glucose 200 on am labs. Last . Gave 1 mg Bumex IV along with PO bumex. Great UOP.

## 2021-07-18 NOTE — PLAN OF CARE
Problem: Adult Inpatient Plan of Care  Goal: Plan of Care Review  Recent Flowsheet Documentation  Taken 7/18/2021 1003 by Gabriel Bills OT  Progress: improving  Plan of Care Reviewed With: patient  Outcome Summary: Pt demonstrated improved fxl endurance on this date. Supervision (d/t line mngt) for bed mobility, transfers, LB dressing, and grooming. Pt O2 sats >90% on HFNC during LB ADL completion and >96% on NRB during functional mobility. Pt demo'd good recall for BUE AROM HEP w/ pulmonary set, performed 2/5reps. Educated Pt on energy conservation and activity pacing strategies upon discharge.  Pt is near baseline for ADL completion, reducing frequency to 3x/wk to monitor ADL performance as O2 demands decrease. Recommend d/c to home w/ OP pulm rehab.

## 2021-07-18 NOTE — PROGRESS NOTES
INTENSIVIST / PULMONARY FOLLOW UP NOTE     Hospital:  LOS: 6 days   Mr. Norberto Keita, 45 y.o. male is followed for:     Acute on chronic respiratory failure with hypoxia and hypercapnia (CMS/HCC)    Hypertension, essential    Class 3 severe obesity due to excess calories with serious comorbidity and body mass index (BMI) of 40.0 to 44.9 in adult (CMS/HCC)    Acute pulmonary edema (CMS/HCC)    Hypertensive emergency    Obstructive sleep apnea    Obesity hypoventilation syndrome (CMS/HCC) -suspected    Acute on chronic diastolic congestive heart failure (CMS/HCC)    Mixed hyperlipidemia    Prediabetes          SUBJECTIVE   Remains on 50-55% HFNC    The patient's relevant past medical, surgical, family, and social history were reviewed    Allergies and medications were reviewed    ROS:  Per subjective, all other systems were reviewed and were negative        OBJECTIVE     Vital Sign Min/Max for last 24 hours:  Temp  Min: 97 °F (36.1 °C)  Max: 98.4 °F (36.9 °C)   BP  Min: 118/97  Max: 153/96   Pulse  Min: 66  Max: 93   Resp  Min: 17  Max: 21   SpO2  Min: 88 %  Max: 97 %   No data recorded     Physical Exam:  General Appearance:  Conversant, in no distress  Eyes:  No scleral icterus or pallor, pupils normal  Ears, Nose, Mouth, Throat:  Atraumatic, oropharynx clear  Neck:  Trachea midline, thyroid normal  Respiratory: clear to auscultation bilaterally, normal effort, no tenderness to palpation  Cardiovascular:  Regular rate and rhythm, no murmurs, no edema, no thrill  Gastrointestinal:  Soft, nontender, nondistended, no hepatosplenomegaly  Skin:  Normal temperature, no rash  Psychiatric:  Alert and oriented x 3, normal judgement and insight  Neuro:  No new focal neurologic deficits observed  MSK:  Right elbow warm and tender but improving    Telemetry:              Hemodynamics:   CVP:     PAP:     PAOP:     CO:     CI:     SVI:     SVR:       SpO2: 90 % SpO2  Min: 88 %  Max: 97 %   Device:      Flow Rate:   No data  recorded     Mechanical Ventilator Settings:                                         Intake/Ouptut 24 hrs (7:00AM - 6:59 AM)  Intake & Output (last 3 days)       07/15 0701 - 07/16 0700 07/16 0701 - 07/17 0700 07/17 0701 - 07/18 0700 07/18 0701 - 07/19 0700    P.O. 6764 739 4703     I.V. (mL/kg) 30 (0.2) 16 (0.1) 308.8 (1.8) 121 (0.7)    IV Piggyback 396 500 218.9     Total Intake(mL/kg) 1806 (10.3) 1356 (8) 2207.7 (13) 121 (0.7)    Urine (mL/kg/hr) 3225 (0.8) 5275 (1.3) 2850 (0.7) 1150 (1.3)    Stool    0    Total Output 3225 5275 2850 1150    Net -1419 -3919 -642.3 -1029            Urine Unmeasured Occurrence    1 x    Stool Unmeasured Occurrence    1 x          Lines, Drains & Airways    Active LDAs     Name:   Placement date:   Placement time:   Site:   Days:    Peripheral IV 07/12/21 1553 Right Antecubital   07/12/21    1553    Antecubital   less than 1    Peripheral IV 07/12/21 2300 Left Antecubital   07/12/21    2300    Antecubital   less than 1                Hematology:  Results from last 7 days   Lab Units 07/18/21  0522 07/17/21  0119 07/16/21  0402 07/15/21  0602 07/14/21  0330 07/12/21  1552   WBC 10*3/mm3 10.66 13.85* 11.71* 9.56 10.60 10.86*   HEMOGLOBIN g/dL 17.2 17.6 17.3 17.2 16.9 16.8   HEMATOCRIT % 57.7* 58.6* 58.9* 56.9* 55.5* 56.9*   PLATELETS 10*3/mm3 263 273 250 205 212 219     Electrolytes, Magnesium and Phosphorus:  Results from last 7 days   Lab Units 07/18/21  0522 07/17/21  0119 07/16/21  0402 07/15/21  0603 07/14/21  1527 07/14/21  0330 07/13/21  1655 07/13/21  0355   SODIUM mmol/L 136 138 134* 136  --  137 136 138   CHLORIDE mmol/L 96* 95* 92* 93*  --  91* 92* 96*   POTASSIUM mmol/L 4.3 4.4 4.3 3.7 3.8 3.2* 3.8 4.3  4.2   CO2 mmol/L 29.0 29.0 32.0* 33.0*  --  33.0* 33.0* 26.0   MAGNESIUM mg/dL 2.1 2.1 2.1 2.0  --  2.0 2.0 1.7   PHOSPHORUS mg/dL 4.5 4.2 3.4 3.9  --  3.0 2.7 2.9     Renal:  Results from last 7 days   Lab Units 07/18/21  0522 07/17/21  0119 07/16/21  0402 07/15/21  0603  07/14/21  0330 07/13/21  1655 07/13/21  0355   CREATININE mg/dL 0.77 0.92 0.82 0.88 1.00 0.91 0.87   BUN mg/dL 24* 22* 20 15 13 10 9     Estimated Creatinine Clearance: 200.9 mL/min (by C-G formula based on SCr of 0.77 mg/dL).  Hepatic:  Results from last 7 days   Lab Units 07/18/21  0522 07/17/21  0119 07/16/21  0402 07/15/21  0603 07/14/21  0330 07/12/21  1552   ALK PHOS U/L 102 117 124* 110 123* 131*   BILIRUBIN mg/dL 0.9 1.2 1.8* 2.9* 2.9* 1.5*   ALT (SGPT) U/L 77* 83* 57* 28 43* 73*   AST (SGOT) U/L 34 54* 44* 15 21 34     Arterial Blood Gases:  Results from last 7 days   Lab Units 07/14/21  0307 07/12/21  1940 07/12/21  1724 07/12/21  1605   PH, ARTERIAL pH units 7.487* 7.340* 7.280* 7.325*   PCO2, ARTERIAL mm Hg 54.9* 69.6* 77.9* 68.9*   PO2 ART mm Hg 62.9* 54.9* 83.7 61.3*   FIO2 % 100 100 90 40             Lab Results   Component Value Date    LACTATE 1.0 07/15/2021       Relevant imaging studies and labs from 07/18/21 were reviewed and interpreted by me    Medications (drips):       allopurinol, 100 mg, Oral, Daily  aspirin, 81 mg, Oral, Daily  bumetanide, 1 mg, Oral, Daily  carvedilol, 12.5 mg, Oral, Q12H  colchicine, 1.2 mg, Oral, BID  doxycycline, 100 mg, Intravenous, Q12H  enoxaparin, 30 mg, Subcutaneous, Q12H  fluticasone, 2 spray, Each Nare, BID  insulin detemir, 10 Units, Subcutaneous, Daily  insulin lispro, 0-7 Units, Subcutaneous, TID AC  ipratropium-albuterol, 3 mL, Nebulization, Q4H - RT  lactobacillus acidophilus, 1 capsule, Oral, Daily  methylPREDNISolone sodium succinate, 40 mg, Intravenous, Q8H  oxymetazoline, 2 spray, Each Nare, BID  pantoprazole, 40 mg, Oral, Q AM  pharmacy consult - MTM, , Does not apply, Daily  piperacillin-tazobactam, 4.5 g, Intravenous, Q8H  sodium chloride, 10 mL, Intravenous, Q12H        Assessment/Plan   IMPRESSION / PLAN     Inpatient Problem List:  45 y.o.male:  Active Hospital Problems    Diagnosis    • **Acute on chronic respiratory failure with hypoxia and  hypercapnia (CMS/HCC)    • Acute on chronic diastolic congestive heart failure (CMS/HCC)      · Echo 7-12-21: Left ventricular wall thickness is consistent with moderate to severe concentric hypertrophy. Left ventricular ejection fraction appears to be 51 - 55%.     • Mixed hyperlipidemia    • Prediabetes    • Acute pulmonary edema (CMS/HCC)    • Hypertensive emergency    • Obstructive sleep apnea    • Obesity hypoventilation syndrome (CMS/HCC) -suspected    • Class 3 severe obesity due to excess calories with serious comorbidity and body mass index (BMI) of 40.0 to 44.9 in adult (CMS/HCC)    • Hypertension, essential         Impression:  45 y.o.male with relevant PMH of BMI 48, likely LEILANI / OHS, HTN, recently ran out of medication admitted 7/12/2021 with shortness of breath, LE edema, and uncontrolled HTN.  Found to have /125 and Severe Acute Hypoxemic & Hypercapnic Respiratory Failure requiring 100% Bipap.  CTA neg for PE, but did show bibasilar atelectasis / pneumonia, thickened LV myocardium, and enlarged PA.  No recent vomiting but has had severe reflux.    Overall appears to be a combination of Acute Diastolic HF and Bibasilar PNA / Atelectasis.  Suspect some degree of chronic Hypercapnic Respiratory Failure as pH nearly normal with PCO2 70 and BE +8.1.  Echo w/ EF 51-55%, mod to severe concentric hypertrophy, mild RV dilation, normal RVSP.  Patient Mother has h/o HOCM.    Despite aggressive treatment patient remains profound hypoxemic, but breathing comfortably.  No shunt on Echo.  No obvious occupational, recreational, or infectious exposures.    Plan:  Severe Acute Hypoxemic Respiratory Failure  AoC Hypercapnic Respiratory Failure  LEILANI  OHS  Acute Diastolic HF  Hypertensive Emergency  Bi-basilar Pneumonia / Atelectasis  Gout Flare  Elevated LFTs    Bipap hs and prn.  Keep on HFNC - wean FiO2 as able to keep sat >88.  Finally able to wean FiO2 on HFNC to 45-55%.  Started po maintenance bumex - give  additional dose IV x1 today.  Empiric pip-tazo for pneumonia.  Added doxy for atypical coverage 7/16.  Duoneb w/ metaneb for pulmonary toilet. Empiric reflux treatment w/ PPI.  D/c'd vanco - MRSA PCR negative.  Tight BP control.  Coreg for DHF, mod to severe concentric LVH.  Family h/o HOCM.  Hold ARB in setting of aggressive diuresis / contrast / IV vanco.      If patient ends up having LHC would benefit from having RHC as well to exclude PAH.    Continue colchicine and allopurinol for gout flare.  Started IV solumedrol 7/15.      Inflammatory markers are elevated.  Increased dose of solumedrol 7/16 and patient appears to have had a dramatic improvement with that, will continue.  Still stalled around 55% HFNC.  Follow up auto-immune labs, fungal serologies, respiratory viral PCR, Tick panel.    Replace electrolytes prn    Appreciate Cardiology help.    DVT / PUD prophylaxis    Nutrition - Diet Regular; Cardiac     PT/OT    Plan of care and goals reviewed with multidisciplinary team at daily rounds    High Risk secondary to severe hypoxemic / hypercapnic respiratory failure, Acute DHF, Hypertensive Emergency, very high risk for decompensation / mechanical ventilation / etc.    Critical Care time spent in direct patient care: 30 minutes (excluding procedure time, if applicable) including high complexity decision making to assess, manipulate, and support vital organ system failure in this individual who has impairment of one or more vital organ systems such that there is a high probability of imminent or life threatening deterioration in the patient’s condition.       Tato Whitaker MD  Intensive Care Medicine  07/18/21 12:15 EDT

## 2021-07-18 NOTE — PLAN OF CARE
Goal Outcome Evaluation:  Plan of Care Reviewed With: patient        Progress: improving  Outcome Summary: Wore bipap most of night. Sats 88-90% while sleeping on back but up to 98% while sleeping on right side. Afebrile, VSS. Norco given x1 for gout pain.

## 2021-07-18 NOTE — THERAPY TREATMENT NOTE
Patient Name: Norberto Keita  : 1975    MRN: [id number]                              Today's Date: 2021       Admit Date: 2021    Visit Dx:     ICD-10-CM ICD-9-CM   1. Acute respiratory failure with hypoxia and hypercarbia (CMS/HCC)  J96.01 518.81    J96.02      Patient Active Problem List   Diagnosis   • Gout of big toe   • Generalized anxiety disorder   • Hypertension, essential   • Reduced libido   • Hypogonadism in male   • Class 3 severe obesity due to excess calories with serious comorbidity and body mass index (BMI) of 40.0 to 44.9 in adult (CMS/HCC)   • Acute on chronic respiratory failure with hypoxia and hypercapnia (CMS/HCC)   • Acute pulmonary edema (CMS/HCC)   • Hypertensive emergency   • Obstructive sleep apnea   • Obesity hypoventilation syndrome (CMS/[facility]) -suspected   • Acute on chronic diastolic congestive heart failure (CMS/HCC)   • Mixed hyperlipidemia   • Prediabetes     Past Medical History:   Diagnosis Date   • Sacroiliitis (CMS/HCC)      Past Surgical History:   Procedure Laterality Date   • HYDROCELE EXCISION / REPAIR       [facility] [place] Vaginal[facility] Excision of Hydrocele   • KNEE SURGERY Left    • VASECTOMY       General Information     Row Name 21 0957          OT Time and Intention    Document Type  therapy note (daily note)  -CS     Mode of Treatment  occupational therapy  -CS     Row Name 21 0957          General Information    Patient Profile Reviewed  yes  -CS     Existing Precautions/Restrictions  oxygen therapy device and L/min;other (see comments) NRB for mobility  -CS     Barriers to Rehab  medically complex  -CS     Row Name 21 0957          Cognition    Orientation Status (Cognition)  oriented x 4  -CS     Row Name 21 0957          Safety Issues, Functional Mobility    Impairments Affecting Function (Mobility)  endurance/activity tolerance  -CS       User Key  (r) = Recorded By, (t) = Taken By, (c) = Cosigned By    Initials Name  Provider Type     Gabriel Bills OT Occupational Therapist          Mobility/ADL's     Row Name 07/18/21 0958          Bed Mobility    Bed Mobility  supine-sit;scooting/bridging  -     Scooting/Bridging [place] (Bed Mobility)  supervision  -CS     Supine-Sit [place] (Bed Mobility)  supervision  -CS     Assistive Device (Bed Mobility)  head of bed elevated  -     Row Name 07/18/21 0958          Transfers    Transfers  sit-stand transfer;bed-chair transfer  -CS     Bed-Chair [place] (Transfers)  supervision  -CS     Sit-Stand [place] (Transfers)  supervision  -     Row Name 07/18/21 0958          Functional Mobility    Functional Mobility- Comment  Supervision (for line mgnt) for in-room and hallway functional distances, O2 sats remained WNL on NRB  -     Row Name 07/18/21 0958          Activities of Daily Living    BADL Assessment/Intervention  lower body dressing;grooming;bathing  -     Row Name 07/18/21 0958          Lower Body Dressing Assessment/Training    [place] Level (Lower Body Dressing)  don;socks;pants/bottoms;supervision  -     Comment (Lower Body Dressing)  supervision d/t line mngt  -     Row Name 07/18/21 0958          Grooming Assessment/Training    [place] Level (Grooming)  hair care, combing/brushing;wash face, hands;independent  -CS     Position (Grooming)  supported sitting  -     Row Name 07/18/21 0958          Bathing Assessment/Intervention    [place] Level (Bathing)  bathing skills;other (see comments)  -     Comment (Bathing)  Educated Pt on EC strategies during bathing to decrease O2 demand and increase safety, discussed bath seating as option  -       User Key  (r) = Recorded By, (t) = Taken By, (c) = Cosigned By    Initials Name Provider Type     Gabriel Bills OT Occupational Therapist        Obj/Interventions     Row Name 07/18/21 1001          Shoulder (Therapeutic Exercise)    Shoulder (Therapeutic Exercise)  AROM (active  range of motion)  -     Shoulder AROM (Therapeutic Exercise)  bilateral;flexion;extension;horizontal aBduction/aDduction;scapular elevation;2 sets;5 repetitions  -[facility] Name 07/18/21 1001          Elbow/Forearm (Therapeutic Exercise)    Elbow/Forearm (Therapeutic Exercise)  AROM (active range of motion)  -     Elbow/Forearm AROM (Therapeutic Exercise)  bilateral;flexion;2 sets;5 repetitions  -[facility] Name 07/18/21 1001          Motor Skills    Motor Skills  functional endurance  -     Functional Endurance  Pt maintained O2 sats WNL during LB dressing (HFNC) and mobility (NRB)  -[facility] Name 07/18/21 1001          Balance    Balance Assessment  sitting static balance;sitting dynamic balance;standing static balance;standing dynamic balance  -     Static Sitting Balance  WNL;unsupported;sitting, edge of bed  -     Dynamic Sitting Balance  WNL;unsupported;sitting, edge of bed  -CS     Static Standing Balance  WNL;unsupported;standing  -CS     Dynamic Standing Balance  WFL;unsupported;standing  -CS     Comment, Balance  no LOB during ADL completion of mobility  -CS     Row Name 07/18/21 1001          Therapeutic Exercise    Therapeutic Exercise  shoulder;elbow/forearm;other (see comments) Reviewed BUE AROM HEP w/ pulmonary set, demonstrated understanding  -       User Key  (r) = Recorded By, (t) = Taken By, (c) = Cosigned By    Initials Name Provider Type    Gabriel Hand, OT Occupational Therapist        Goals/Plan    No documentation.       Clinical Impression     Row Name 07/18/21 1003          Pain Assessment    Additional Documentation  Pain Scale: FACES Pre/Post-Treatment (Group)  -CS     Row Name 07/18/21 1003          Pain Scale: FACES Pre/Post-Treatment    Pain: FACES Scale, Pretreatment  0-->no hurt  -CS     Posttreatment Pain Rating  0-->no hurt  -CS     Pre/Posttreatment Pain Comment  no visual/verbal indications of pain during tx  -[facility] Name 07/18/21 1003          Plan of  Care Review    Plan of Care Reviewed With  patient  -CS     Progress  improving  -CS     Outcome Summary  Pt demonstrated improved fxl endurance on this date. Supervision (d/t line mngt) for bed mobility, transfers, LB dressing, and grooming. Pt O2 sats >90% on HFNC during LB ADL completion and >96% on NRB during functional mobility. Pt demo'd good recall for BUE AROM HEP w/ pulmonary set, performed 2/5reps. Pt is near baseline for ADL completion, reducing frequency to 3x/wk to monitor ADL performance as O2 demands decrease. Recommend d/c to home w/ OP pulm rehab.  -CS     Row Name 07/18/21 1003          Therapy Assessment/Plan (OT)    Therapy Frequency (OT)  3 times/wk  -CS     Row Name 07/18/21 1003          Therapy Plan Review/Discharge Plan (OT)    Anticipated Discharge Disposition (OT)  home;home with outpatient therapy services  -CS     Row Name 07/18/21 1003          Vital Signs    Pre Systolic BP Rehab  153  -CS     Pre Treatment Diastolic BP  96  -CS     Post Systolic BP Rehab  132  -CS     Post Treatment Diastolic BP  95  -CS     Pre SpO2 (%)  92  -CS     O2 Delivery Pre Treatment  hi-flow  -CS     Intra SpO2 (%)  96  -CS     O2 Delivery Intra Treatment  non-rebreather  -CS     Post SpO2 (%)  94  -CS     O2 Delivery Post Treatment  hi-flow  -CS     Pre Patient Position  Supine  -CS     Intra Patient Position  Standing  -CS     Post Patient Position  Sitting  -CS     Row Name 07/18/21 1003          Positioning and Restraints    Pre-Treatment Position  in bed  -CS     Post Treatment Position  chair  -CS     In Chair  notified nsg;reclined;sitting;encouraged to call for assist;call light within reach;exit alarm on;with nsg;legs elevated;waffle cushion  -CS       User Key  (r) = Recorded By, (t) = Taken By, (c) = Cosigned By    Initials Name Provider Type    CS Gabriel Bills, OT Occupational Therapist        Outcome Measures     Row Name 07/18/21 1009          How much help from another is currently  needed...    Putting on and taking off regular lower body clothing?  4  -CS     Bathing (including washing, rinsing, and drying)  3  -CS     Toileting (which includes using toilet bed pan or urinal)  3  -CS     Putting on and taking off regular upper body clothing  4  -CS     Taking care of personal grooming (such as brushing teeth)  4  -CS     Eating meals  4  -CS     AM-PAC 6 Clicks Score (OT)  22  -CS     Row Name 07/18/21 1009          Functional Assessment    Outcome Measure Options  AM-PAC 6 Clicks Daily Activity (OT)  -CS       User Key  (r) = Recorded By, (t) = Taken By, (c) = Cosigned By    Initials Name Provider Type    CS Gabriel Bills OT Occupational Therapist        Occupational Therapy Education                 Title: PT OT SLP Therapies (In Progress)     Topic: Occupational Therapy (Done)     Point: ADL training (Done)     Description:   Instruct learner(s) on proper safety adaptation and remediation techniques during self care or transfers.   Instruct in proper use of assistive devices.              Learning Progress Summary           Patient Acceptance, E,D, VU,DU by  at 7/18/2021 1010    Comment: EC and activity pacing, BUE AROM w/ PLB    Acceptance, E, NR by CL at 7/16/2021 1306                   Point: Home exercise program (Done)     Description:   Instruct learner(s) on appropriate technique for monitoring, assisting and/or progressing therapeutic exercises/activities.              Learning Progress Summary           Patient Acceptance, E,D, VU,DU by  at 7/18/2021 1010    Comment: EC and activity pacing, BUE AROM w/ PLB    Acceptance, E, NR by CL at 7/16/2021 1306                   Point: Precautions (Done)     Description:   Instruct learner(s) on prescribed precautions during self-care and functional transfers.              Learning Progress Summary           Patient Acceptance, E,D, VU,DU by  at 7/18/2021 1010    Comment: EC and activity pacing, BUE AROM w/ PLB    Acceptance, E, NR  by  at 7/16/2021 1306                   Point: Body mechanics (Done)     Description:   Instruct learner(s) on proper positioning and spine alignment during self-care, functional mobility activities and/or exercises.              Learning Progress Summary           Patient Acceptance, E,D, VU,DU by  at 7/18/2021 1010    Comment: EC and activity pacing, BUE AROM w/ PLB    Acceptance, E, NR by  at 7/16/2021 1306                               User Key     Initials Effective Dates Name Provider Type Discipline     04/03/18 -  Lakeisha Arceo OT Occupational Therapist OT     06/16/21 -  Gabriel Bills OT Occupational Therapist OT              OT Recommendation and Plan  Therapy Frequency (OT): 3 times/wk  Plan of Care Review  Plan of Care Reviewed With: patient  Progress: improving  Outcome Summary: Pt demonstrated improved fxl endurance on this date. Supervision (d/t line mngt) for bed mobility, transfers, LB dressing, and grooming. Pt O2 sats >90% on HFNC during LB ADL completion and >96% on NRB during functional mobility. Pt demo'd good recall for BUE AROM HEP w/ pulmonary set, performed 2/5reps. Pt is near baseline for ADL completion, reducing frequency to 3x/wk to monitor ADL performance as O2 demands decrease. Recommend d/c to home w/ OP pulm rehab.     Time Calculation:   Time Calculation- OT     Row Name 07/18/21 1012             Time Calculation- OT    OT Start Time  0933  -CS      OT Received On  07/18/21  -      OT Goal Re-Cert Due Date  07/26/21  -CS         Timed Charges    97511 - OT Therapeutic Exercise Minutes  6  -CS      90037 - OT Therapeutic Activity Minutes  12  -CS      15431 - OT Self Care/Mgmt Minutes  6  -CS         Total Minutes    Timed Charges Total Minutes  24  -CS       Total Minutes  24  -CS        User Key  (r) = Recorded By, (t) = Taken By, (c) = Cosigned By    Initials Name Provider Type     Gabriel Bills OT Occupational Therapist        Therapy Charges for Today      Code Description Service Date Service Provider Modifiers Qty    37669205470  OT THERAPEUTIC ACT EA 15 MIN 7/18/2021 Gabriel Bills OT GO 1    [id number]  OT THER PROC EA 15 MIN 7/18/2021 Gabriel Bills OT GO 1               Gabriel Bills OT  7/18/2021

## 2021-07-19 LAB
ALBUMIN SERPL-MCNC: 3.6 G/DL (ref 3.5–5.2)
ALBUMIN/GLOB SERPL: 1.2 G/DL
ALP SERPL-CCNC: 91 U/L (ref 39–117)
ALT SERPL W P-5'-P-CCNC: 64 U/L (ref 1–41)
ANION GAP SERPL CALCULATED.3IONS-SCNC: 15 MMOL/L (ref 5–15)
AST SERPL-CCNC: 22 U/L (ref 1–40)
B BURGDOR IGG+IGM SER-ACNC: <0.91 ISR (ref 0–0.9)
B BURGDOR IGM SER IA-ACNC: <0.8 INDEX (ref 0–0.79)
BASOPHILS # BLD AUTO: 0.01 10*3/MM3 (ref 0–0.2)
BASOPHILS NFR BLD AUTO: 0.1 % (ref 0–1.5)
BILIRUB SERPL-MCNC: 0.9 MG/DL (ref 0–1.2)
BUN SERPL-MCNC: 21 MG/DL (ref 6–20)
BUN/CREAT SERPL: 26.3 (ref 7–25)
CALCIUM SPEC-SCNC: 9.3 MG/DL (ref 8.6–10.5)
CENTROMERE B AB SER-ACNC: <0.2 AI (ref 0–0.9)
CHLORIDE SERPL-SCNC: 96 MMOL/L (ref 98–107)
CHROMATIN AB SERPL-ACNC: <0.2 AI (ref 0–0.9)
CO2 SERPL-SCNC: 24 MMOL/L (ref 22–29)
CREAT SERPL-MCNC: 0.8 MG/DL (ref 0.76–1.27)
CRP SERPL-MCNC: 0.99 MG/DL (ref 0–0.5)
DEPRECATED RDW RBC AUTO: 47.2 FL (ref 37–54)
DSDNA AB SER-ACNC: 1 IU/ML (ref 0–9)
ENA JO1 AB SER-ACNC: <0.2 AI (ref 0–0.9)
ENA RNP AB SER-ACNC: 0.2 AI (ref 0–0.9)
ENA SCL70 AB SER-ACNC: <0.2 AI (ref 0–0.9)
ENA SM AB SER-ACNC: <0.2 AI (ref 0–0.9)
ENA SS-A AB SER-ACNC: <0.2 AI (ref 0–0.9)
ENA SS-B AB SER-ACNC: <0.2 AI (ref 0–0.9)
EOSINOPHIL # BLD AUTO: 0 10*3/MM3 (ref 0–0.4)
EOSINOPHIL NFR BLD AUTO: 0 % (ref 0.3–6.2)
ERYTHROCYTE [DISTWIDTH] IN BLOOD BY AUTOMATED COUNT: 15.4 % (ref 12.3–15.4)
ERYTHROCYTE [SEDIMENTATION RATE] IN BLOOD: 27 MM/HR (ref 0–15)
GFR SERPL CREATININE-BSD FRML MDRD: 105 ML/MIN/1.73
GLOBULIN UR ELPH-MCNC: 3.1 GM/DL
GLUCOSE BLDC GLUCOMTR-MCNC: 135 MG/DL (ref 70–130)
GLUCOSE BLDC GLUCOMTR-MCNC: 156 MG/DL (ref 70–130)
GLUCOSE BLDC GLUCOMTR-MCNC: 163 MG/DL (ref 70–130)
GLUCOSE BLDC GLUCOMTR-MCNC: 173 MG/DL (ref 70–130)
GLUCOSE SERPL-MCNC: 161 MG/DL (ref 65–99)
HCT VFR BLD AUTO: 58.2 % (ref 37.5–51)
HGB BLD-MCNC: 17 G/DL (ref 13–17.7)
IMM GRANULOCYTES # BLD AUTO: 0.06 10*3/MM3 (ref 0–0.05)
IMM GRANULOCYTES NFR BLD AUTO: 0.7 % (ref 0–0.5)
LYMPHOCYTES # BLD AUTO: 0.66 10*3/MM3 (ref 0.7–3.1)
LYMPHOCYTES NFR BLD AUTO: 7.8 % (ref 19.6–45.3)
Lab: NORMAL
MAGNESIUM SERPL-MCNC: 2.2 MG/DL (ref 1.6–2.6)
MCH RBC QN AUTO: 26.1 PG (ref 26.6–33)
MCHC RBC AUTO-ENTMCNC: 29.2 G/DL (ref 31.5–35.7)
MCV RBC AUTO: 89.4 FL (ref 79–97)
MONOCYTES # BLD AUTO: 0.5 10*3/MM3 (ref 0.1–0.9)
MONOCYTES NFR BLD AUTO: 5.9 % (ref 5–12)
NEUTROPHILS NFR BLD AUTO: 7.23 10*3/MM3 (ref 1.7–7)
NEUTROPHILS NFR BLD AUTO: 85.5 % (ref 42.7–76)
NRBC BLD AUTO-RTO: 0 /100 WBC (ref 0–0.2)
NT-PROBNP SERPL-MCNC: 72 PG/ML (ref 0–450)
PHOSPHATE SERPL-MCNC: 4.4 MG/DL (ref 2.5–4.5)
PLATELET # BLD AUTO: 255 10*3/MM3 (ref 140–450)
PMV BLD AUTO: 10.6 FL (ref 6–12)
POTASSIUM SERPL-SCNC: 4.5 MMOL/L (ref 3.5–5.2)
PROT SERPL-MCNC: 6.7 G/DL (ref 6–8.5)
RBC # BLD AUTO: 6.51 10*6/MM3 (ref 4.14–5.8)
SODIUM SERPL-SCNC: 135 MMOL/L (ref 136–145)
WBC # BLD AUTO: 8.46 10*3/MM3 (ref 3.4–10.8)

## 2021-07-19 PROCEDURE — 25010000002 PIPERACILLIN SOD-TAZOBACTAM PER 1 G: Performed by: INTERNAL MEDICINE

## 2021-07-19 PROCEDURE — 25010000002 METHYLPREDNISOLONE PER 40 MG: Performed by: INTERNAL MEDICINE

## 2021-07-19 PROCEDURE — 85025 COMPLETE CBC W/AUTO DIFF WBC: CPT | Performed by: INTERNAL MEDICINE

## 2021-07-19 PROCEDURE — 94799 UNLISTED PULMONARY SVC/PX: CPT

## 2021-07-19 PROCEDURE — 83880 ASSAY OF NATRIURETIC PEPTIDE: CPT | Performed by: INTERNAL MEDICINE

## 2021-07-19 PROCEDURE — 63710000001 INSULIN LISPRO (HUMAN) PER 5 UNITS: Performed by: NURSE PRACTITIONER

## 2021-07-19 PROCEDURE — 99233 SBSQ HOSP IP/OBS HIGH 50: CPT | Performed by: INTERNAL MEDICINE

## 2021-07-19 PROCEDURE — 25010000002 METHYLPREDNISOLONE PER 125 MG: Performed by: INTERNAL MEDICINE

## 2021-07-19 PROCEDURE — 99232 SBSQ HOSP IP/OBS MODERATE 35: CPT | Performed by: INTERNAL MEDICINE

## 2021-07-19 PROCEDURE — 83735 ASSAY OF MAGNESIUM: CPT | Performed by: NURSE PRACTITIONER

## 2021-07-19 PROCEDURE — 25010000002 ENOXAPARIN PER 10 MG: Performed by: INTERNAL MEDICINE

## 2021-07-19 PROCEDURE — 80053 COMPREHEN METABOLIC PANEL: CPT | Performed by: INTERNAL MEDICINE

## 2021-07-19 PROCEDURE — 86140 C-REACTIVE PROTEIN: CPT | Performed by: INTERNAL MEDICINE

## 2021-07-19 PROCEDURE — 63710000001 INSULIN DETEMIR PER 5 UNITS: Performed by: INTERNAL MEDICINE

## 2021-07-19 PROCEDURE — 82962 GLUCOSE BLOOD TEST: CPT

## 2021-07-19 PROCEDURE — 85652 RBC SED RATE AUTOMATED: CPT | Performed by: INTERNAL MEDICINE

## 2021-07-19 PROCEDURE — 94660 CPAP INITIATION&MGMT: CPT

## 2021-07-19 PROCEDURE — 84100 ASSAY OF PHOSPHORUS: CPT | Performed by: INTERNAL MEDICINE

## 2021-07-19 RX ORDER — METHYLPREDNISOLONE SODIUM SUCCINATE 125 MG/2ML
80 INJECTION, POWDER, LYOPHILIZED, FOR SOLUTION INTRAMUSCULAR; INTRAVENOUS EVERY 8 HOURS SCHEDULED
Status: DISCONTINUED | OUTPATIENT
Start: 2021-07-19 | End: 2021-07-20

## 2021-07-19 RX ORDER — LOSARTAN POTASSIUM 50 MG/1
50 TABLET ORAL
Status: DISCONTINUED | OUTPATIENT
Start: 2021-07-19 | End: 2021-07-20

## 2021-07-19 RX ADMIN — DOXYCYCLINE 100 MG: 100 INJECTION, POWDER, LYOPHILIZED, FOR SOLUTION INTRAVENOUS at 20:43

## 2021-07-19 RX ADMIN — SALINE NASAL SPRAY 1 SPRAY: 1.5 SOLUTION NASAL at 14:04

## 2021-07-19 RX ADMIN — ASPIRIN 81 MG CHEWABLE TABLET 81 MG: 81 TABLET CHEWABLE at 08:34

## 2021-07-19 RX ADMIN — HYDROCODONE BITARTRATE AND ACETAMINOPHEN 1 TABLET: 5; 325 TABLET ORAL at 20:54

## 2021-07-19 RX ADMIN — ALLOPURINOL 100 MG: 100 TABLET ORAL at 08:33

## 2021-07-19 RX ADMIN — IPRATROPIUM BROMIDE AND ALBUTEROL SULFATE 3 ML: 2.5; .5 SOLUTION RESPIRATORY (INHALATION) at 07:23

## 2021-07-19 RX ADMIN — IPRATROPIUM BROMIDE AND ALBUTEROL SULFATE 3 ML: 2.5; .5 SOLUTION RESPIRATORY (INHALATION) at 00:02

## 2021-07-19 RX ADMIN — INSULIN DETEMIR 10 UNITS: 100 INJECTION, SOLUTION SUBCUTANEOUS at 08:41

## 2021-07-19 RX ADMIN — ENOXAPARIN SODIUM 30 MG: 30 INJECTION SUBCUTANEOUS at 20:48

## 2021-07-19 RX ADMIN — CARVEDILOL 12.5 MG: 12.5 TABLET, FILM COATED ORAL at 20:49

## 2021-07-19 RX ADMIN — HYDROCODONE BITARTRATE AND ACETAMINOPHEN 1 TABLET: 5; 325 TABLET ORAL at 15:16

## 2021-07-19 RX ADMIN — METHYLPREDNISOLONE SODIUM SUCCINATE 80 MG: 125 INJECTION, POWDER, FOR SOLUTION INTRAMUSCULAR; INTRAVENOUS at 14:03

## 2021-07-19 RX ADMIN — METHYLPREDNISOLONE SODIUM SUCCINATE 40 MG: 40 INJECTION, POWDER, FOR SOLUTION INTRAMUSCULAR; INTRAVENOUS at 05:04

## 2021-07-19 RX ADMIN — COLCHICINE 1.2 MG: 0.6 TABLET, FILM COATED ORAL at 20:48

## 2021-07-19 RX ADMIN — IPRATROPIUM BROMIDE AND ALBUTEROL SULFATE 3 ML: 2.5; .5 SOLUTION RESPIRATORY (INHALATION) at 03:57

## 2021-07-19 RX ADMIN — HYDROCODONE BITARTRATE AND ACETAMINOPHEN 1 TABLET: 5; 325 TABLET ORAL at 09:09

## 2021-07-19 RX ADMIN — INSULIN LISPRO 2 UNITS: 100 INJECTION, SOLUTION INTRAVENOUS; SUBCUTANEOUS at 12:05

## 2021-07-19 RX ADMIN — ENOXAPARIN SODIUM 30 MG: 30 INJECTION SUBCUTANEOUS at 08:33

## 2021-07-19 RX ADMIN — ACETAMINOPHEN 650 MG: 325 TABLET ORAL at 10:26

## 2021-07-19 RX ADMIN — SODIUM CHLORIDE, PRESERVATIVE FREE 10 ML: 5 INJECTION INTRAVENOUS at 08:34

## 2021-07-19 RX ADMIN — INSULIN LISPRO 2 UNITS: 100 INJECTION, SOLUTION INTRAVENOUS; SUBCUTANEOUS at 18:00

## 2021-07-19 RX ADMIN — TAZOBACTAM SODIUM AND PIPERACILLIN SODIUM 4.5 G: 500; 4 INJECTION, SOLUTION INTRAVENOUS at 22:19

## 2021-07-19 RX ADMIN — DOXYCYCLINE 100 MG: 100 INJECTION, POWDER, LYOPHILIZED, FOR SOLUTION INTRAVENOUS at 08:34

## 2021-07-19 RX ADMIN — BUMETANIDE 1 MG: 1 TABLET ORAL at 08:34

## 2021-07-19 RX ADMIN — COLCHICINE 1.2 MG: 0.6 TABLET, FILM COATED ORAL at 08:33

## 2021-07-19 RX ADMIN — CARVEDILOL 12.5 MG: 12.5 TABLET, FILM COATED ORAL at 08:34

## 2021-07-19 RX ADMIN — METHYLPREDNISOLONE SODIUM SUCCINATE 80 MG: 125 INJECTION, POWDER, FOR SOLUTION INTRAMUSCULAR; INTRAVENOUS at 22:19

## 2021-07-19 RX ADMIN — PANTOPRAZOLE SODIUM 40 MG: 40 TABLET, DELAYED RELEASE ORAL at 05:04

## 2021-07-19 RX ADMIN — TAZOBACTAM SODIUM AND PIPERACILLIN SODIUM 4.5 G: 500; 4 INJECTION, SOLUTION INTRAVENOUS at 14:03

## 2021-07-19 RX ADMIN — TAZOBACTAM SODIUM AND PIPERACILLIN SODIUM 4.5 G: 500; 4 INJECTION, SOLUTION INTRAVENOUS at 05:04

## 2021-07-19 RX ADMIN — LOSARTAN POTASSIUM 50 MG: 50 TABLET, FILM COATED ORAL at 16:30

## 2021-07-19 RX ADMIN — Medication 1 CAPSULE: at 08:34

## 2021-07-19 RX ADMIN — FLUTICASONE PROPIONATE 2 SPRAY: 50 SPRAY, METERED NASAL at 08:34

## 2021-07-19 RX ADMIN — SODIUM CHLORIDE, PRESERVATIVE FREE 10 ML: 5 INJECTION INTRAVENOUS at 20:49

## 2021-07-19 RX ADMIN — FLUTICASONE PROPIONATE 2 SPRAY: 50 SPRAY, METERED NASAL at 20:49

## 2021-07-19 RX ADMIN — INSULIN LISPRO 2 UNITS: 100 INJECTION, SOLUTION INTRAVENOUS; SUBCUTANEOUS at 20:48

## 2021-07-19 NOTE — CASE MANAGEMENT/SOCIAL WORK
Continued Stay Note  [facility]     Patient Name: Norberto Keita  MRN: [id number]  Today's Date: 7/19/2021    Admit Date: 7/12/2021    Discharge Plan     Row Name 07/19/21 1331       Plan    Plan  Home    Patient/Family in Agreement with Plan  yes    Plan Comments  Spoke with patient at bedside.  Patient still on HFNC and denies any needs at this time.  Patient still plans to return home upon discharge.  CM will continue to follow.        Discharge Codes    No documentation.       Expected Discharge Date and Time     Expected Discharge Date Expected Discharge Time    Jul 24, 2021             Yasmin Georges RN

## 2021-07-19 NOTE — PLAN OF CARE
Goal Outcome Evaluation:  Plan of Care Reviewed With: patient, spouse            Patient OOBTC this shift and weaned to 5 L NC while maintaining oxygen > 90%. Norco given PRN for pain r/t gout. Tylenol given for headache. UOP 2.6 L and VSS. Patient has orders to tele and awaiting bed assignment. It's been a pleasure caring for Mr. Keita.

## 2021-07-19 NOTE — PROGRESS NOTES
Subjective   Norberto Keita is a 45 y.o. male is being seen for consultation today at the request of MIREILLE Buck for the evaluation of snoring and nonrestorative sleep. He is also seen by Dr. Cheung. You have chosen to receive care through a telehealth visit.  Do you consent to use a video/audio connection for your medical care today? Yes    History of Present Illness  Patient has a history of snoring for over 10 years. He apparently had been on CPAP briefly previously but has not been on any therapy recently. He had a study at [facility] roughly 10 years ago but did not get the machine. He remembers being told that he had severe obstructive sleep apnea. He admits he has gained probably 60 pounds since then.    He continues to have apneas noted as well as loud snoring. He has awaken gasping for breath. He is not rested on arising in the morning. He has a morning headache 2 to 3 days/week. He has falls asleep after work. He has gotten sleepy driving. He denies kicking or jerking his legs at night. He denies having chronic pain.    He has been hospitalized in has been noted to have low oxygen saturations while sleeping. He has been placed on a bilevel for respiratory failure. They were not able to control his events on CPAP.    He goes to bed usually 9 PM. He will fall asleep quickly. He awakens at least four times during the night. He thinks he gets 6 to 7 hours of sleep but is not rested. He has had hypertension known for 15 years. He denies any history of diabetes. He has had congestive heart failure noted.  Allergies   Allergen Reactions   • Lisinopril Cough        No current facility-administered medications for this visit.  No current outpatient medications on file.    Facility-Administered Medications Ordered in Other Visits:   •  acetaminophen (TYLENOL) tablet 650 mg, 650 mg, Oral, Q6H PRN, Nikos Hoffmann MD, 650 mg at 07/18/21 1828  •  allopurinol (ZYLOPRIM) tablet 100 mg, 100 mg,  Oral, Daily, Tato Whitaker MD, 100 mg at 07/18/21 0804  •  aspirin chewable tablet 81 mg, 81 mg, Oral, Daily, Tato Whitaker MD, 81 mg at 07/18/21 0803  •  bumetanide (BUMEX) tablet 1 mg, 1 mg, Oral, Daily, Tato Whitaker MD, 1 mg at 07/18/21 0803  •  calcium gluconate 1 g in sodium chloride 0.9 % 100 mL IVPB, 1 g, Intravenous, PRN **AND** calcium gluconate 6 g in sodium chloride 0.9 % 500 mL IVPB, 6 g, Intravenous, PRN **AND** Calcium, Ionized, , , PRN, Kaitlin Mills, APRN  •  carvedilol (COREG) tablet 12.5 mg, 12.5 mg, Oral, Q12H, Myra Montano MD, 12.5 mg at 07/18/21 0804  •  colchicine tablet 1.2 mg, 1.2 mg, Oral, BID, Tato Whitaker MD, 1.2 mg at 07/18/21 0803  •  dextrose (D50W) 25 g/ 50mL Intravenous Solution 25 g, 25 g, Intravenous, Q15 Min PRN, Tato Whitaker MD  •  dextrose (GLUTOSE) oral gel 15 g, 15 g, Oral, Q15 Min PRN, Tato Whitaker MD  •  doxycycline (VIBRAMYCIN) 100 mg/100 mL 0.9% NS MBP, 100 mg, Intravenous, Q12H, Tato Whitaker MD, 100 mg at 07/18/21 0801  •  enoxaparin (LOVENOX) syringe 30 mg, 30 mg, Subcutaneous, Q12H, Tato Whitaker MD, 30 mg at 07/18/21 0802  •  fluticasone (FLONASE) 50 MCG/ACT nasal spray 2 spray, 2 spray, Each Nare, BID, Tato Whitaker MD, 2 spray at 07/18/21 0805  •  glucagon (human recombinant) (GLUCAGEN DIAGNOSTIC) injection 1 mg, 1 mg, Subcutaneous, Q15 Min PRN, Tato Whitaker MD  •  HYDROcodone-acetaminophen (NORCO) 5-325 MG per tablet 1 tablet, 1 tablet, Oral, Q6H PRN, Tato Whitaker MD, 1 tablet at 07/18/21 1138  •  insulin detemir (LEVEMIR) injection 10 Units, 10 Units, Subcutaneous, Daily, Tato Whitaker MD, 10 Units at 07/18/21 1134  •  insulin lispro (humaLOG) injection 0-7 Units, 0-7 Units, Subcutaneous, TID AC, Tato Whitaker MD, 2 Units at 07/18/21 1701  •  ipratropium-albuterol  (DUO-NEB) nebulizer solution 3 mL, 3 mL, Nebulization, Q6H PRN, Kaitlin Mills, APRN  •  ipratropium-albuterol (DUO-NEB) nebulizer solution 3 mL, 3 mL, Nebulization, Q4H - RT, Tato Whitaker MD, 3 mL at 07/18/21 1922  •  lactobacillus acidophilus (RISAQUAD) capsule 1 capsule, 1 capsule, Oral, Daily, Tato Whitaker MD, 1 capsule at 07/18/21 0803  •  Magnesium Sulfate 2 gram Bolus, followed by 8 gram infusion (total Mg dose 10 grams)- Mg less than or equal to 1mg/dL, 2 g, Intravenous, PRN **OR** Magnesium Sulfate 2 gram / 50mL Infusion (GIVE X 3 BAGS TO EQUAL 6GM TOTAL DOSE) - Mg 1.1 - 1.5 mg/dl, 2 g, Intravenous, PRN **OR** Magnesium Sulfate 4 gram infusion- Mg 1.6-1.9 mg/dL, 4 g, Intravenous, PRN, Kaitlin Mills, APRN, Last Rate: 25 mL/hr at 07/13/21 0600, 4 g at 07/13/21 0600  •  melatonin tablet 10 mg, 10 mg, Oral, Nightly PRN, Tato Whitaker MD, 10 mg at 07/15/21 2019  •  methylPREDNISolone sodium succinate (SOLU-Medrol) injection 40 mg, 40 mg, Intravenous, Q8H, Tato Whitaker MD, 40 mg at 07/18/21 1407  •  ondansetron (ZOFRAN) tablet 4 mg, 4 mg, Oral, Q6H PRN **OR** ondansetron (ZOFRAN) injection 4 mg, 4 mg, Intravenous, Q6H PRN, Tato Whitaker MD  •  oxymetazoline (AFRIN) nasal spray 2 spray, 2 spray, Each Nare, BID, Tato Whitaker MD, 2 spray at 07/18/21 0804  •  pantoprazole (PROTONIX) EC tablet 40 mg, 40 mg, Oral, Q AM, Tato Whitaker MD, 40 mg at 07/18/21 0525  •  Pharmacy Consult - MTM, , Does not apply, Daily, Jane Beck, PharmD  •  piperacillin-tazobactam (ZOSYN) 4.5 g in iso-osmotic dextrose 100 mL IVPB (premix), 4.5 g, Intravenous, Q8H, Nikos Hoffmann MD, 4.5 g at 07/18/21 1407  •  potassium chloride 10 mEq in 100 mL IVPB, 10 mEq, Intravenous, Q1H PRN, Kaitlin Mills, APRN, Last Rate: 100 mL/hr at 07/14/21 0857, 10 mEq at 07/14/21 0857  •  potassium phosphate 45 mmol in sodium chloride 0.9 % 500 mL  infusion, 45 mmol, Intravenous, PRN **OR** potassium phosphate 30 mmol in sodium chloride 0.9 % 250 mL infusion, 30 mmol, Intravenous, PRN **OR** potassium phosphate 15 mmol in sodium chloride 0.9 % 100 mL infusion, 15 mmol, Intravenous, PRN **OR** sodium phosphates 45 mmol in sodium chloride 0.9 % 250 mL IVPB, 45 mmol, Intravenous, PRN **OR** sodium phosphates 30 mmol in sodium chloride 0.9 % 250 mL IVPB, 30 mmol, Intravenous, PRN **OR** sodium phosphates 15 mmol in sodium chloride 0.9 % 250 mL IVPB, 15 mmol, Intravenous, PRN, Kaitlin Mills, APRN  •  sennosides-docusate (PERICOLACE) 8.6-50 MG per tablet 2 tablet, 2 tablet, Oral, BID PRN, Tato Whitaker MD  •  sodium chloride 0.9 % flush 10 mL, 10 mL, Intravenous, Q12H, Kaitlin Mills APRN, 10 mL at 07/18/21 0805  •  sodium chloride 0.9 % flush 10 mL, 10 mL, Intravenous, PRN, Kaitlin Mills APRN  •  sodium chloride nasal spray 1 spray, 1 spray, Each Nare, PRJOSÉ MIGUEL, Tato Whitaker MD, 1 spray at 07/16/21 2059  •  temazepam (RESTORIL) capsule 15 mg, 15 mg, Oral, Nightly PRN, Tato Whitaker MD, 15 mg at 07/17/21 2218    Social History    Tobacco Use      Smoking status: Never Smoker      Smokeless tobacco: Never Used       Social History     Substance and Sexual Activity   Alcohol Use Yes he apparently drinks heavily 3-4 times per week    Comment: 3-4 times weekly       Caffeine: He has 3-4 alexa per day    Past Medical History:   Diagnosis Date   • Sacroiliitis (CMS/HCC)        Past Surgical History:   Procedure Laterality Date   • HYDROCELE EXCISION / REPAIR       Surgery [place] Vaginalis Excision of Hydrocele   • KNEE SURGERY Left    • VASECTOMY         Family History   Problem Relation Age of Onset   • Diabetes Mother    • Hyperlipidemia Mother    • Hypertension Mother    • Coronary artery disease Father    • Hyperlipidemia Father    • Hypertension Father    • Heart disease Father    • Heart failure Father    • Deep vein  thrombosis Father        The following portions of the patient's history were reviewed and updated as appropriate: allergies, current medications, past family history, past medical history, past social history, past surgical history and problem list.    Review of Systems   Constitutional: Positive for fatigue and unexpected weight change.   Eyes: Negative.    Respiratory: Positive for cough and wheezing.    Cardiovascular: Positive for leg swelling.   Gastrointestinal: Negative.    Endocrine: Negative.    Genitourinary: Negative.    Musculoskeletal: Positive for arthralgias, gait problem and joint swelling.   Skin: Negative.    Allergic/Immunologic: Negative.    Neurological: Positive for headaches.   Hematological: Negative.    Psychiatric/Behavioral: Negative.    Mesa score is 15/24    Objective     There were no vitals taken for this visit.     Physical Exam  Patient appears to be awake and alert. He is not appear to be in acute respiratory distress. His stated weight is 360 pounds. His height 61 inches. His body mass index is 48. He has Mallampati class IV anatomy.    Assessment/Plan   Diagnoses and all orders for this visit:    1. Snoring (Primary)  -     Detailed AutoPAP Order    2. Obstructive sleep apnea, adult  -     Detailed AutoPAP Order    3. Acute on chronic respiratory failure with hypoxia and hypercapnia (CMS/HCC)  -     Detailed AutoPAP Order    4. Class 3 severe obesity due to excess calories with serious comorbidity and body mass index (BMI) of 40.0 to 44.9 in adult (CMS/HCC)    Patient apparently had a previous sleep study that showed severe obstructive sleep apnea but he did not initiate therapy. He has gained weight since then he probably still has severe obstructive sleep apnea. We will try to obtain a copy of his previous study we will order him on therapy. In the hospital he was not controlled with CPAP and is on a bilevel. We will order an auto bilevel device. We have discussed other  potential therapies including weight control, oral appliance, and surgery. We have also discussed the long-term consequences of untreated obstructive sleep apnea. These include hypertension, diabetes, heart disease, stroke, and dementia. He is encouraged to lose weight. He is encouraged to avoid alcohol or sedatives close to bedtime. He is encouraged to practice lateral position sleep. We will plan to see him in 2 months and we can download his machine to make certain it is controlling his respiratory events.    Total time: 35 minutes exclusive of procedures.         Regan Hardin MD [facility]  Sleep Medicine  Pulmonary and Critical Care Medicine

## 2021-07-19 NOTE — PLAN OF CARE
Goal Outcome Evaluation:  Plan of Care Reviewed With: patient        Progress: improving  Outcome Summary: HFNC at 35L, 40%. Wore bipap for majority of shift at 100%. VSS.

## 2021-07-19 NOTE — PROGRESS NOTES
"[facility] Cardiology   IP Progress Note   LOS: 7 days   Patient Care Team:  Cam Cheung MD as PCP - General    Chief Complaint: Follow up for Diastolic CHF    Subjective Denies Chest Pain,  Eating OK. Continues to [place]at without support. He's up to the chair, comfortable with no complaints.  Oxygenation has somewhat improved with use of steroids.       Active Hospital Problems    Diagnosis    • **Acute on chronic respiratory failure with hypoxia and hypercapnia (CMS/HCC)    • Acute on chronic diastolic congestive heart failure (CMS/HCC)      · Echo 7-12-21: Left ventricular wall thickness is consistent with moderate to severe concentric hypertrophy. Left ventricular ejection fraction appears to be 51 - 55%.     • Hypertensive emergency    • Mixed hyperlipidemia    • Hypertension, essential    • Prediabetes    • Class 3 severe obesity due to excess calories with serious comorbidity and body mass index (BMI) of 40.0 to 44.9 in adult (CMS/HCC)    • Acute pulmonary edema (CMS/HCC)    • Obstructive sleep apnea    • Obesity hypoventilation syndrome (CMS/HCC) -suspected        Tele: Sinus Rhythm with frequent PACs    Vitals:  /79 (BP Location: Left arm, Patient Position: Lying)   Pulse 87   Temp 97.6 °F (36.4 °C) (Axillary)   Resp 22   Ht 185.4 cm (72.99\")   Wt (!) 170 kg (374 lb 12.5 oz)   SpO2 100%   BMI 49.46 kg/m²    Body mass index is 49.46 kg/m².    Intake/Output Summary (Last 24 hours) at 7/19/2021 0743  Last data filed at 7/19/2021 0504  Gross per 24 hour   Intake 1331 ml   Output 4850 ml   Net -3519 ml       Physical Exam:  General: No apparent distress.  Remains on high flow oxygen.  Neck: no JVD.  Chest:No respiratory distress, breath sounds are diminished at bases with scattered rhonchi.    Cardiovascular: Normal S1 and S2, no murmur, gallop or rub.    Extremities: No significant edema.      Results Review:         Labs:  Results from last 7 days   Lab Units 07/19/21  0419   WBC " 10*3/mm3 8.46   HEMOGLOBIN g/dL 17.0   HEMATOCRIT % 58.2*   PLATELETS 10*3/mm3 255     Results from last 7 days   Lab Units 07/18/21  0522 07/17/21  0119 07/16/21  0402   SODIUM mmol/L 136 138 134*   POTASSIUM mmol/L 4.3 4.4 4.3   CHLORIDE mmol/L 96* 95* 92*   CO2 mmol/L 29.0 29.0 32.0*   BUN mg/dL 24* 22* 20   CREATININE mg/dL 0.77 0.92 0.82   GLUCOSE mg/dL 200* 174* 147*   CALCIUM mg/dL 9.3 9.5 9.5   ALT (SGPT) U/L 77* 83* 57*   AST (SGOT) U/L 34 54* 44*     Estimated Creatinine Clearance: 200.9 mL/min (by C-G formula based on SCr of 0.77 mg/dL).  Lab Results   Component Value Date    HGBA1C 6.1 06/02/2021     Results from last 7 days   Lab Units 07/12/21  1552   TROPONIN T ng/mL 0.019     Results from last 7 days   Lab Units 07/19/21  0419 07/16/21  0402 07/12/21  1552   PROBNP pg/mL 72.0 55.8 845.1*     Lab Results   Component Value Date    CHLPL 130 06/02/2021    TRIG 114 06/02/2021    HDL 36 (L) 06/02/2021    LDL 73 06/02/2021         Scheduled Meds:  allopurinol, 100 mg, Oral, Daily  aspirin, 81 mg, Oral, Daily  bumetanide, 1 mg, Oral, Daily  carvedilol, 12.5 mg, Oral, Q12H  colchicine, 1.2 mg, Oral, BID  doxycycline, 100 mg, Intravenous, Q12H  enoxaparin, 30 mg, Subcutaneous, Q12H  fluticasone, 2 spray, Each Nare, BID  insulin detemir, 10 Units, Subcutaneous, Daily  insulin lispro, 0-7 Units, Subcutaneous, 4x Daily With Meals & Nightly  ipratropium-albuterol, 3 mL, Nebulization, Q4H - RT  lactobacillus acidophilus, 1 capsule, Oral, Daily  methylPREDNISolone sodium succinate, 40 mg, Intravenous, Q8H  pantoprazole, 40 mg, Oral, Q AM  pharmacy consult - MTM, , Does not apply, Daily  piperacillin-tazobactam, 4.5 g, Intravenous, Q8H  sodium chloride, 10 mL, Intravenous, Q12H         Assessment:  1. Acute diastolic congestive heart failure presenting with hypoxemic respiratory failure.  Normal EF by echocardiogram. Negative bubble study for intracardiac shunt.  2. The degree of hypoxemia is out of proportion to  fluid overload, pulmonary service following.  3. Hypertension which has been previously labile, currently controlled.  4. Obstructive sleep apnea.  5. Moderate obesity.  6. Abnormal liver function tests with elevated bilirubin.     Plan:  1. Continue current management including current dose of aspirin, Coreg and diuretics.  2. Appears euvolemic, closely monitor renal function and volume status.  managing medical conditions and respiratory failure.  3. We will re-visit on Wednesday, we will be available to see him sooner if needed, please call for questions.    Electronically signed by MIREILLE Aiken, 07/19/21, 7:49 AM EDT.    I have seen and examined the patient, case was discussed with the physician extender, reviewed the above note, necessary changes were made and I agree with the final note.   Myra Montano MD, FACC, [facility]

## 2021-07-19 NOTE — PROGRESS NOTES
INTENSIVIST / PULMONARY FOLLOW UP NOTE     Hospital:  LOS: 7 days   Mr. Norberto Keita, 45 y.o. male is followed for:     Acute on chronic respiratory failure with hypoxia and hypercapnia (CMS/HCC)    Hypertension, essential    Class 3 severe obesity due to excess calories with serious comorbidity and body mass index (BMI) of 40.0 to 44.9 in adult (CMS/HCC)    Acute pulmonary edema (CMS/HCC)    Hypertensive emergency    Obstructive sleep apnea    Obesity hypoventilation syndrome (CMS/HCC) -suspected    Acute on chronic diastolic congestive heart failure (CMS/HCC)    Mixed hyperlipidemia    Prediabetes          SUBJECTIVE   Remains on HFNC    The patient's relevant past medical, surgical, family, and social history were reviewed    Allergies and medications were reviewed    ROS:  Per subjective, all other systems were reviewed and were negative        OBJECTIVE     Vital Sign Min/Max for last 24 hours:  Temp  Min: 97.6 °F (36.4 °C)  Max: 98.1 °F (36.7 °C)   BP  Min: 112/88  Max: 159/111   Pulse  Min: 67  Max: 89   Resp  Min: 17  Max: 24   SpO2  Min: 88 %  Max: 100 %   No data recorded     Physical Exam:  General Appearance:  Conversant, in no distress  Eyes:  No scleral icterus or pallor, pupils normal  Ears, Nose, Mouth, Throat:  Atraumatic, oropharynx clear  Neck:  Trachea midline, thyroid normal  Respiratory: clear to auscultation bilaterally, normal effort, no tenderness to palpation  Cardiovascular:  Regular rate and rhythm, no murmurs, no edema, no thrill  Gastrointestinal:  Soft, nontender, nondistended, no hepatosplenomegaly  Skin:  Normal temperature, no rash  Psychiatric:  Alert and oriented x 3, normal judgement and insight  Neuro:  No new focal neurologic deficits observed  MSK:  Right elbow warm and tender but improving    Telemetry:              Hemodynamics:   CVP:     PAP:     PAOP:     CO:     CI:     SVI:     SVR:       SpO2: 95 % SpO2  Min: 88 %  Max: 100 %   Device:      Flow Rate:   No data  recorded     Mechanical Ventilator Settings:                                         Intake/Ouptut 24 hrs (7:00AM - 6:59 AM)  Intake & Output (last 3 days)       07/16 0701 - 07/17 0700 07/17 0701 - 07/18 0700 07/18 0701 - 07/19 0700 07/19 0701 - 07/20 0700    P.O. 840 1680 720 980    I.V. (mL/kg) 16 (0.1) 308.8 (1.8) 311 (1.8)     IV Piggyback 500 218.9 300     Total Intake(mL/kg) 1356 (8) 2207.7 (13) 1331 (7.8) 980 (5.8)    Urine (mL/kg/hr) 5275 (1.3) 2850 (0.7) 4850 (1.2) 2625 (1.8)    Stool   0 0    Total Output 5275 2850 4850 2625    Net -3919 -642.3 -3519 -1645            Urine Unmeasured Occurrence   1 x 1 x    Stool Unmeasured Occurrence   1 x 1 x          Lines, Drains & Airways    Active LDAs     Name:   Placement date:   Placement time:   Site:   Days:    Peripheral IV 07/12/21 1553 Right Antecubital   07/12/21    1553    Antecubital   less than 1    Peripheral IV 07/12/21 2300 Left Antecubital   07/12/21    2300    Antecubital   less than 1                Hematology:  Results from last 7 days   Lab Units 07/19/21 0419 07/18/21  0522 07/17/21  0119 07/16/21  0402 07/15/21  0602 07/14/21  0330 07/12/21  1552   WBC 10*3/mm3 8.46 10.66 13.85* 11.71* 9.56 10.60 10.86*   HEMOGLOBIN g/dL 17.0 17.2 17.6 17.3 17.2 16.9 16.8   HEMATOCRIT % 58.2* 57.7* 58.6* 58.9* 56.9* 55.5* 56.9*   PLATELETS 10*3/mm3 255 263 273 250 205 212 219     Electrolytes, Magnesium and Phosphorus:  Results from last 7 days   Lab Units 07/19/21  0419 07/18/21  0522 07/17/21  0119 07/16/21  0402 07/15/21  0603 07/14/21  1527 07/14/21  0330 07/13/21  1655   SODIUM mmol/L 135* 136 138 134* 136  --  137 136   CHLORIDE mmol/L 96* 96* 95* 92* 93*  --  91* 92*   POTASSIUM mmol/L 4.5 4.3 4.4 4.3 3.7 3.8 3.2* 3.8   CO2 mmol/L 24.0 29.0 29.0 32.0* 33.0*  --  33.0* 33.0*   MAGNESIUM mg/dL 2.2 2.1 2.1 2.1 2.0  --  2.0 2.0   PHOSPHORUS mg/dL 4.4 4.5 4.2 3.4 3.9  --  3.0 2.7     Renal:  Results from last 7 days   Lab Units 07/19/21  0419 07/18/21  0522  07/17/21  0119 07/16/21  0402 07/15/21  0603 07/14/21  0330 07/13/21  1655   CREATININE mg/dL 0.80 0.77 0.92 0.82 0.88 1.00 0.91   BUN mg/dL 21* 24* 22* 20 15 13 10     Estimated Creatinine Clearance: 193.3 mL/min (by C-G formula based on SCr of 0.8 mg/dL).  Hepatic:  Results from last 7 days   Lab Units 07/19/21  0419 07/18/21  0522 07/17/21  0119 07/16/21  0402 07/15/21  0603 07/14/21  0330 07/12/21  1552   ALK PHOS U/L 91 102 117 124* 110 123* 131*   BILIRUBIN mg/dL 0.9 0.9 1.2 1.8* 2.9* 2.9* 1.5*   ALT (SGPT) U/L 64* 77* 83* 57* 28 43* 73*   AST (SGOT) U/L 22 34 54* 44* 15 21 34     Arterial Blood Gases:  Results from last 7 days   Lab Units 07/14/21  0307 07/12/21  1940 07/12/21  1724 07/12/21  1605   PH, ARTERIAL pH units 7.487* 7.340* 7.280* 7.325*   PCO2, ARTERIAL mm Hg 54.9* 69.6* 77.9* 68.9*   PO2 ART mm Hg 62.9* 54.9* 83.7 61.3*   FIO2 % 100 100 90 40             Lab Results   Component Value Date    LACTATE 1.0 07/15/2021       Relevant imaging studies and labs from 07/19/21 were reviewed and interpreted by me    Medications (drips):       allopurinol, 100 mg, Oral, Daily  aspirin, 81 mg, Oral, Daily  bumetanide, 1 mg, Oral, Daily  carvedilol, 12.5 mg, Oral, Q12H  colchicine, 1.2 mg, Oral, BID  doxycycline, 100 mg, Intravenous, Q12H  enoxaparin, 30 mg, Subcutaneous, Q12H  fluticasone, 2 spray, Each Nare, BID  insulin detemir, 10 Units, Subcutaneous, Daily  insulin lispro, 0-7 Units, Subcutaneous, 4x Daily With Meals & Nightly  ipratropium-albuterol, 3 mL, Nebulization, Q4H - RT  lactobacillus acidophilus, 1 capsule, Oral, Daily  losartan, 50 mg, Oral, Q24H  methylPREDNISolone sodium succinate, 80 mg, Intravenous, Q8H  pantoprazole, 40 mg, Oral, Q AM  pharmacy consult - MTM, , Does not apply, Daily  piperacillin-tazobactam, 4.5 g, Intravenous, Q8H  sodium chloride, 10 mL, Intravenous, Q12H        Assessment/Plan   IMPRESSION / PLAN     Inpatient Problem List:  45 y.o.male:  Active Hospital Problems     Diagnosis    • **Acute on chronic respiratory failure with hypoxia and hypercapnia (CMS/HCC)    • Acute on chronic diastolic congestive heart failure (CMS/HCC)      · Echo 7-12-21: Left ventricular wall thickness is consistent with moderate to severe concentric hypertrophy. Left ventricular ejection fraction appears to be 51 - 55%.     • Mixed hyperlipidemia    • Prediabetes    • Acute pulmonary edema (CMS/HCC)    • Hypertensive emergency    • Obstructive sleep apnea    • Obesity hypoventilation syndrome (CMS/HCC) -suspected    • Class 3 severe obesity due to excess calories with serious comorbidity and body mass index (BMI) of 40.0 to 44.9 in adult (CMS/HCC)    • Hypertension, essential         Impression:  45 y.o.male with relevant PMH of BMI 48, likely LEILANI / OHS, HTN, recently ran out of medication admitted 7/12/2021 with shortness of breath, LE edema, and uncontrolled HTN.  Found to have /125 and Severe Acute Hypoxemic & Hypercapnic Respiratory Failure requiring 100% Bipap.  CTA neg for PE, but did show bibasilar atelectasis / pneumonia, thickened LV myocardium, and enlarged PA.  No recent vomiting but has had severe reflux.    Overall appears to be a combination of Acute Diastolic HF and Bibasilar PNA / Atelectasis.  Suspect some degree of chronic Hypercapnic Respiratory Failure as pH nearly normal with PCO2 70 and BE +8.1.  Echo w/ EF 51-55%, mod to severe concentric hypertrophy, mild RV dilation, normal RVSP.  Patient Mother has h/o HOCM.    Despite aggressive treatment patient remains profound hypoxemic, but breathing comfortably.  No shunt on Echo.  No obvious occupational, recreational, or infectious exposures.    Plan:  Severe Acute Hypoxemic Respiratory Failure  AoC Hypercapnic Respiratory Failure  LEILANI  OHS  Acute Diastolic HF  Hypertensive Emergency  Bi-basilar Pneumonia / Atelectasis  Gout Flare  Elevated LFTs    Bipap hs and prn.  Try to wean off HFNC if able.  Started po maintenance bumex.   Empiric pip-tazo for pneumonia.  Added doxy for atypical coverage 7/16.  Duoneb w/ metaneb for pulmonary toilet. Empiric reflux treatment w/ PPI.  D/c'd vanco - MRSA PCR negative.  Tight BP control.  Coreg for DHF, mod to severe concentric LVH.  Family h/o HOCM.  Add ARB - BP still not controlled.  Euvolemic.      If patient ends up having LHC would benefit from having RHC as well to exclude PAH.    Continue colchicine and allopurinol for gout flare.  Started IV solumedrol 7/15.      Inflammatory markers are elevated.  Continue high dose solumedrol - increased dose again today, patient seems to be responding well to this.  Still stalled around 40% HFNC.  Follow up auto-immune labs, fungal serologies, respiratory viral PCR, Tick panel.    Replace electrolytes prn    Appreciate Cardiology help.    DVT / PUD prophylaxis    Nutrition - Diet Regular; Cardiac     PT/OT    To tele    Plan of care and goals reviewed with multidisciplinary team at daily rounds    High Risk secondary to severe hypoxemic / hypercapnic respiratory failure, Acute DHF, Hypertensive Emergency, very high risk for decompensation / mechanical ventilation / etc.           Tato Whitaker MD  Intensive Care Medicine  07/19/21 15:35 EDT

## 2021-07-20 LAB
A FLAVUS AB SER QL ID: NEGATIVE
A FUMIGATUS AB SER QL ID: NEGATIVE
A NIGER AB SER QL ID: NEGATIVE
ALBUMIN SERPL-MCNC: 3.9 G/DL (ref 3.5–5.2)
ALBUMIN/GLOB SERPL: 1.3 G/DL
ALP SERPL-CCNC: 92 U/L (ref 39–117)
ALT SERPL W P-5'-P-CCNC: 56 U/L (ref 1–41)
ANION GAP SERPL CALCULATED.3IONS-SCNC: 12 MMOL/L (ref 5–15)
AST SERPL-CCNC: 22 U/L (ref 1–40)
B DERMAT AB TITR SER: NEGATIVE {TITER}
BASOPHILS # BLD AUTO: 0.01 10*3/MM3 (ref 0–0.2)
BASOPHILS NFR BLD AUTO: 0.1 % (ref 0–1.5)
BILIRUB SERPL-MCNC: 1 MG/DL (ref 0–1.2)
BUN SERPL-MCNC: 21 MG/DL (ref 6–20)
BUN/CREAT SERPL: 25.6 (ref 7–25)
C-ANCA TITR SER IF: ABNORMAL TITER
CALCIUM SPEC-SCNC: 9.5 MG/DL (ref 8.6–10.5)
CCP IGA+IGG SERPL IA-ACNC: 7 UNITS (ref 0–19)
CHLORIDE SERPL-SCNC: 95 MMOL/L (ref 98–107)
CO2 SERPL-SCNC: 28 MMOL/L (ref 22–29)
CREAT SERPL-MCNC: 0.82 MG/DL (ref 0.76–1.27)
CRP SERPL-MCNC: 0.6 MG/DL (ref 0–0.5)
DEPRECATED RDW RBC AUTO: 46.4 FL (ref 37–54)
EOSINOPHIL # BLD AUTO: 0 10*3/MM3 (ref 0–0.4)
EOSINOPHIL NFR BLD AUTO: 0 % (ref 0.3–6.2)
ERYTHROCYTE [DISTWIDTH] IN BLOOD BY AUTOMATED COUNT: 15.2 % (ref 12.3–15.4)
ERYTHROCYTE [SEDIMENTATION RATE] IN BLOOD: 29 MM/HR (ref 0–15)
GFR SERPL CREATININE-BSD FRML MDRD: 102 ML/MIN/1.73
GLOBULIN UR ELPH-MCNC: 3.1 GM/DL
GLUCOSE BLDC GLUCOMTR-MCNC: 159 MG/DL (ref 70–130)
GLUCOSE BLDC GLUCOMTR-MCNC: 162 MG/DL (ref 70–130)
GLUCOSE BLDC GLUCOMTR-MCNC: 196 MG/DL (ref 70–130)
GLUCOSE BLDC GLUCOMTR-MCNC: 231 MG/DL (ref 70–130)
GLUCOSE SERPL-MCNC: 189 MG/DL (ref 65–99)
H CAPSUL AG UR QL IA: <0.5
HCT VFR BLD AUTO: 57.9 % (ref 37.5–51)
HGB BLD-MCNC: 17.5 G/DL (ref 13–17.7)
IMM GRANULOCYTES # BLD AUTO: 0.05 10*3/MM3 (ref 0–0.05)
IMM GRANULOCYTES NFR BLD AUTO: 0.6 % (ref 0–0.5)
LYMPHOCYTES # BLD AUTO: 0.56 10*3/MM3 (ref 0.7–3.1)
LYMPHOCYTES NFR BLD AUTO: 6.8 % (ref 19.6–45.3)
Lab: NORMAL
MAGNESIUM SERPL-MCNC: 2.4 MG/DL (ref 1.6–2.6)
MCH RBC QN AUTO: 26.2 PG (ref 26.6–33)
MCHC RBC AUTO-ENTMCNC: 30.2 G/DL (ref 31.5–35.7)
MCV RBC AUTO: 86.7 FL (ref 79–97)
MONOCYTES # BLD AUTO: 0.39 10*3/MM3 (ref 0.1–0.9)
MONOCYTES NFR BLD AUTO: 4.8 % (ref 5–12)
MYELOPEROXIDASE AB SER IA-ACNC: <9 U/ML (ref 0–9)
NEUTROPHILS NFR BLD AUTO: 7.2 10*3/MM3 (ref 1.7–7)
NEUTROPHILS NFR BLD AUTO: 87.7 % (ref 42.7–76)
NRBC BLD AUTO-RTO: 0 /100 WBC (ref 0–0.2)
P-ANCA ATYPICAL TITR SER IF: ABNORMAL TITER
P-ANCA TITR SER IF: ABNORMAL TITER
PHOSPHATE SERPL-MCNC: 4.4 MG/DL (ref 2.5–4.5)
PLATELET # BLD AUTO: 273 10*3/MM3 (ref 140–450)
PMV BLD AUTO: 10.6 FL (ref 6–12)
POTASSIUM SERPL-SCNC: 4.4 MMOL/L (ref 3.5–5.2)
PROCALCITONIN SERPL-MCNC: <0.02 NG/ML (ref 0–0.25)
PROT SERPL-MCNC: 7 G/DL (ref 6–8.5)
PROTEINASE3 AB SER IA-ACNC: <3.5 U/ML (ref 0–3.5)
R RICKETTSI IGG SER QL IA: NEGATIVE
R RICKETTSI IGM SER-ACNC: 0.45 INDEX (ref 0–0.89)
RBC # BLD AUTO: 6.68 10*6/MM3 (ref 4.14–5.8)
SODIUM SERPL-SCNC: 135 MMOL/L (ref 136–145)
WBC # BLD AUTO: 8.21 10*3/MM3 (ref 3.4–10.8)

## 2021-07-20 PROCEDURE — 25010000002 METHYLPREDNISOLONE PER 40 MG: Performed by: INTERNAL MEDICINE

## 2021-07-20 PROCEDURE — 82962 GLUCOSE BLOOD TEST: CPT

## 2021-07-20 PROCEDURE — 85652 RBC SED RATE AUTOMATED: CPT | Performed by: INTERNAL MEDICINE

## 2021-07-20 PROCEDURE — 84100 ASSAY OF PHOSPHORUS: CPT | Performed by: INTERNAL MEDICINE

## 2021-07-20 PROCEDURE — 25010000002 METHYLPREDNISOLONE PER 125 MG: Performed by: INTERNAL MEDICINE

## 2021-07-20 PROCEDURE — 93010 ELECTROCARDIOGRAM REPORT: CPT | Performed by: INTERNAL MEDICINE

## 2021-07-20 PROCEDURE — 63710000001 INSULIN LISPRO (HUMAN) PER 5 UNITS: Performed by: NURSE PRACTITIONER

## 2021-07-20 PROCEDURE — 94799 UNLISTED PULMONARY SVC/PX: CPT

## 2021-07-20 PROCEDURE — 84145 PROCALCITONIN (PCT): CPT | Performed by: INTERNAL MEDICINE

## 2021-07-20 PROCEDURE — 93005 ELECTROCARDIOGRAM TRACING: CPT | Performed by: NURSE PRACTITIONER

## 2021-07-20 PROCEDURE — 80053 COMPREHEN METABOLIC PANEL: CPT | Performed by: INTERNAL MEDICINE

## 2021-07-20 PROCEDURE — 25010000002 PIPERACILLIN SOD-TAZOBACTAM PER 1 G: Performed by: INTERNAL MEDICINE

## 2021-07-20 PROCEDURE — 25010000002 ENOXAPARIN PER 10 MG: Performed by: INTERNAL MEDICINE

## 2021-07-20 PROCEDURE — 63710000001 INSULIN DETEMIR PER 5 UNITS: Performed by: INTERNAL MEDICINE

## 2021-07-20 PROCEDURE — 86140 C-REACTIVE PROTEIN: CPT | Performed by: INTERNAL MEDICINE

## 2021-07-20 PROCEDURE — 83735 ASSAY OF MAGNESIUM: CPT | Performed by: NURSE PRACTITIONER

## 2021-07-20 PROCEDURE — 85025 COMPLETE CBC W/AUTO DIFF WBC: CPT | Performed by: INTERNAL MEDICINE

## 2021-07-20 RX ORDER — LOSARTAN POTASSIUM 50 MG/1
100 TABLET ORAL
Status: DISCONTINUED | OUTPATIENT
Start: 2021-07-21 | End: 2021-07-22 | Stop reason: HOSPADM

## 2021-07-20 RX ORDER — DOXYCYCLINE 100 MG/1
100 CAPSULE ORAL EVERY 12 HOURS SCHEDULED
Status: DISCONTINUED | OUTPATIENT
Start: 2021-07-20 | End: 2021-07-21

## 2021-07-20 RX ORDER — COLCHICINE 0.6 MG/1
0.6 TABLET ORAL 2 TIMES DAILY
Status: DISCONTINUED | OUTPATIENT
Start: 2021-07-20 | End: 2021-07-22 | Stop reason: HOSPADM

## 2021-07-20 RX ORDER — METHYLPREDNISOLONE SODIUM SUCCINATE 40 MG/ML
40 INJECTION, POWDER, LYOPHILIZED, FOR SOLUTION INTRAMUSCULAR; INTRAVENOUS EVERY 8 HOURS SCHEDULED
Status: DISCONTINUED | OUTPATIENT
Start: 2021-07-20 | End: 2021-07-21

## 2021-07-20 RX ORDER — LOSARTAN POTASSIUM 50 MG/1
50 TABLET ORAL ONCE
Status: COMPLETED | OUTPATIENT
Start: 2021-07-20 | End: 2021-07-20

## 2021-07-20 RX ADMIN — CARVEDILOL 12.5 MG: 12.5 TABLET, FILM COATED ORAL at 21:46

## 2021-07-20 RX ADMIN — HYDROCODONE BITARTRATE AND ACETAMINOPHEN 1 TABLET: 5; 325 TABLET ORAL at 04:14

## 2021-07-20 RX ADMIN — LOSARTAN POTASSIUM 50 MG: 50 TABLET, FILM COATED ORAL at 08:20

## 2021-07-20 RX ADMIN — ENOXAPARIN SODIUM 30 MG: 30 INJECTION SUBCUTANEOUS at 21:45

## 2021-07-20 RX ADMIN — TAZOBACTAM SODIUM AND PIPERACILLIN SODIUM 4.5 G: 500; 4 INJECTION, SOLUTION INTRAVENOUS at 11:35

## 2021-07-20 RX ADMIN — IPRATROPIUM BROMIDE AND ALBUTEROL SULFATE 3 ML: 2.5; .5 SOLUTION RESPIRATORY (INHALATION) at 19:38

## 2021-07-20 RX ADMIN — INSULIN LISPRO 3 UNITS: 100 INJECTION, SOLUTION INTRAVENOUS; SUBCUTANEOUS at 11:35

## 2021-07-20 RX ADMIN — Medication 1 CAPSULE: at 08:19

## 2021-07-20 RX ADMIN — COLCHICINE 0.6 MG: 0.6 TABLET, FILM COATED ORAL at 21:45

## 2021-07-20 RX ADMIN — METHYLPREDNISOLONE SODIUM SUCCINATE 40 MG: 40 INJECTION, POWDER, FOR SOLUTION INTRAMUSCULAR; INTRAVENOUS at 21:45

## 2021-07-20 RX ADMIN — SODIUM CHLORIDE, PRESERVATIVE FREE 10 ML: 5 INJECTION INTRAVENOUS at 21:47

## 2021-07-20 RX ADMIN — FLUTICASONE PROPIONATE 2 SPRAY: 50 SPRAY, METERED NASAL at 21:48

## 2021-07-20 RX ADMIN — DOXYCYCLINE 100 MG: 100 INJECTION, POWDER, LYOPHILIZED, FOR SOLUTION INTRAVENOUS at 08:22

## 2021-07-20 RX ADMIN — CARVEDILOL 12.5 MG: 12.5 TABLET, FILM COATED ORAL at 08:19

## 2021-07-20 RX ADMIN — ENOXAPARIN SODIUM 30 MG: 30 INJECTION SUBCUTANEOUS at 08:19

## 2021-07-20 RX ADMIN — DOXYCYCLINE 100 MG: 100 CAPSULE ORAL at 21:45

## 2021-07-20 RX ADMIN — METHYLPREDNISOLONE SODIUM SUCCINATE 80 MG: 125 INJECTION, POWDER, FOR SOLUTION INTRAMUSCULAR; INTRAVENOUS at 13:59

## 2021-07-20 RX ADMIN — INSULIN LISPRO 2 UNITS: 100 INJECTION, SOLUTION INTRAVENOUS; SUBCUTANEOUS at 17:47

## 2021-07-20 RX ADMIN — COLCHICINE 1.2 MG: 0.6 TABLET, FILM COATED ORAL at 08:20

## 2021-07-20 RX ADMIN — HYDROCODONE BITARTRATE AND ACETAMINOPHEN 1 TABLET: 5; 325 TABLET ORAL at 21:54

## 2021-07-20 RX ADMIN — FLUTICASONE PROPIONATE 2 SPRAY: 50 SPRAY, METERED NASAL at 08:26

## 2021-07-20 RX ADMIN — ASPIRIN 81 MG CHEWABLE TABLET 81 MG: 81 TABLET CHEWABLE at 08:20

## 2021-07-20 RX ADMIN — LOSARTAN POTASSIUM 50 MG: 50 TABLET, FILM COATED ORAL at 17:40

## 2021-07-20 RX ADMIN — PANTOPRAZOLE SODIUM 40 MG: 40 TABLET, DELAYED RELEASE ORAL at 05:12

## 2021-07-20 RX ADMIN — IPRATROPIUM BROMIDE AND ALBUTEROL SULFATE 3 ML: 2.5; .5 SOLUTION RESPIRATORY (INHALATION) at 15:41

## 2021-07-20 RX ADMIN — INSULIN LISPRO 2 UNITS: 100 INJECTION, SOLUTION INTRAVENOUS; SUBCUTANEOUS at 08:19

## 2021-07-20 RX ADMIN — INSULIN DETEMIR 10 UNITS: 100 INJECTION, SOLUTION SUBCUTANEOUS at 08:22

## 2021-07-20 RX ADMIN — HYDROCODONE BITARTRATE AND ACETAMINOPHEN 1 TABLET: 5; 325 TABLET ORAL at 14:02

## 2021-07-20 RX ADMIN — BUMETANIDE 1 MG: 1 TABLET ORAL at 08:20

## 2021-07-20 RX ADMIN — SALINE NASAL SPRAY 1 SPRAY: 1.5 SOLUTION NASAL at 08:26

## 2021-07-20 RX ADMIN — TAZOBACTAM SODIUM AND PIPERACILLIN SODIUM 4.5 G: 500; 4 INJECTION, SOLUTION INTRAVENOUS at 21:46

## 2021-07-20 RX ADMIN — ALLOPURINOL 100 MG: 100 TABLET ORAL at 08:20

## 2021-07-20 RX ADMIN — METHYLPREDNISOLONE SODIUM SUCCINATE 80 MG: 125 INJECTION, POWDER, FOR SOLUTION INTRAMUSCULAR; INTRAVENOUS at 05:12

## 2021-07-20 RX ADMIN — INSULIN LISPRO 2 UNITS: 100 INJECTION, SOLUTION INTRAVENOUS; SUBCUTANEOUS at 21:45

## 2021-07-20 RX ADMIN — SODIUM CHLORIDE, PRESERVATIVE FREE 10 ML: 5 INJECTION INTRAVENOUS at 08:25

## 2021-07-20 NOTE — PLAN OF CARE
Goal Outcome Evaluation:  Plan of Care Reviewed With: patient        Progress: no change  Outcome Summary: Pt maintained 02 SATs >88% via 5L NC while awake, wore BiPap 100% HS. DBP increased with activity, WNL at rest. Norco given PRN for gout pain. 1 Large BM, UOP >2400. Pt awaiting transfer to [facility] bed.

## 2021-07-20 NOTE — PROGRESS NOTES
INTENSIVIST / PULMONARY FOLLOW UP NOTE     Hospital:  LOS: 8 days   Mr. Norberto Keita, 45 y.o. male is followed for:     Acute on chronic respiratory failure with hypoxia and hypercapnia (CMS/HCC)    Hypertension, essential    Class 3 severe obesity due to excess calories with serious comorbidity and body mass index (BMI) of 40.0 to 44.9 in adult (CMS/HCC)    Acute pulmonary edema (CMS/HCC)    Hypertensive emergency    Obstructive sleep apnea    Obesity hypoventilation syndrome (CMS/HCC) -suspected    Acute on chronic diastolic congestive heart failure (CMS/HCC)    Mixed hyperlipidemia    Prediabetes          SUBJECTIVE   Weaned to regular NC 5-6 liters    The patient's relevant past medical, surgical, family, and social history were reviewed    Allergies and medications were reviewed    ROS:  Per subjective, all other systems were reviewed and were negative        OBJECTIVE     Vital Sign Min/Max for last 24 hours:  Temp  Min: 97.7 °F (36.5 °C)  Max: 98.7 °F (37.1 °C)   BP  Min: 125/97  Max: 160/100   Pulse  Min: 65  Max: 93   Resp  Min: 16  Max: 20   SpO2  Min: 84 %  Max: 95 %   No data recorded     Physical Exam:  General Appearance:  Conversant, in no distress  Eyes:  No scleral icterus or pallor, pupils normal  Ears, Nose, Mouth, Throat:  Atraumatic, oropharynx clear  Neck:  Trachea midline, thyroid normal  Respiratory: clear to auscultation bilaterally, normal effort, no tenderness to palpation  Cardiovascular:  Regular rate and rhythm, no murmurs, no edema, no thrill  Gastrointestinal:  Soft, nontender, nondistended, no hepatosplenomegaly  Skin:  Normal temperature, no rash  Psychiatric:  Alert and oriented x 3, normal judgement and insight  Neuro:  No new focal neurologic deficits observed  MSK:  Right elbow warm and tender but improving    Telemetry:              Hemodynamics:   CVP:     PAP:     PAOP:     CO:     CI:     SVI:     SVR:       SpO2: 90 % SpO2  Min: 84 %  Max: 95 %   Device:      Flow  Rate:   No data recorded     Mechanical Ventilator Settings:                                         Intake/Ouptut 24 hrs (7:00AM - 6:59 AM)  Intake & Output (last 3 days)       07/17 0701 - 07/18 0700 07/18 0701 - 07/19 0700 07/19 0701 - 07/20 0700 07/20 0701 - 07/21 0700    P.O. 4270 241 7293 840    I.V. (mL/kg) 308.8 (1.8) 311 (1.8) 28.8 (0.2)     IV Piggyback 218.9 300 328.7     Total Intake(mL/kg) 2207.7 (13) 1331 (7.8) 1817.5 (10.7) 840 (4.9)    Urine (mL/kg/hr) 2850 (0.7) 4850 (1.2) 6550 (1.6) 3500 (2.3)    Stool  0 0     Total Output 2850 4850 6550 3500    Net -642.3 -3519 -4732.5 -2660            Urine Unmeasured Occurrence  1 x 2 x     Stool Unmeasured Occurrence  1 x 2 x           Lines, Drains & Airways    Active LDAs     Name:   Placement date:   Placement time:   Site:   Days:    Peripheral IV 07/12/21 1553 Right Antecubital   07/12/21    1553    Antecubital   less than 1    Peripheral IV 07/12/21 2300 Left Antecubital   07/12/21    2300    Antecubital   less than 1                Hematology:  Results from last 7 days   Lab Units 07/20/21  0450 07/19/21  0419 07/18/21  0522 07/17/21  0119 07/16/21  0402 07/15/21  0602 07/14/21  0330   WBC 10*3/mm3 8.21 8.46 10.66 13.85* 11.71* 9.56 10.60   HEMOGLOBIN g/dL 17.5 17.0 17.2 17.6 17.3 17.2 16.9   HEMATOCRIT % 57.9* 58.2* 57.7* 58.6* 58.9* 56.9* 55.5*   PLATELETS 10*3/mm3 273 255 263 273 250 205 212     Electrolytes, Magnesium and Phosphorus:  Results from last 7 days   Lab Units 07/20/21  0450 07/19/21  0419 07/18/21  0522 07/17/21  0119 07/16/21  0402 07/15/21  0603 07/14/21  1527 07/14/21  0330   SODIUM mmol/L 135* 135* 136 138 134* 136  --  137   CHLORIDE mmol/L 95* 96* 96* 95* 92* 93*  --  91*   POTASSIUM mmol/L 4.4 4.5 4.3 4.4 4.3 3.7 3.8 3.2*   CO2 mmol/L 28.0 24.0 29.0 29.0 32.0* 33.0*  --  33.0*   MAGNESIUM mg/dL 2.4 2.2 2.1 2.1 2.1 2.0  --  2.0   PHOSPHORUS mg/dL 4.4 4.4 4.5 4.2 3.4 3.9  --  3.0     Renal:  Results from last 7 days   Lab Units  07/20/21  0450 07/19/21  0419 07/18/21  0522 07/17/21  0119 07/16/21  0402 07/15/21  0603 07/14/21  0330   CREATININE mg/dL 0.82 0.80 0.77 0.92 0.82 0.88 1.00   BUN mg/dL 21* 21* 24* 22* 20 15 13     Estimated Creatinine Clearance: 188.6 mL/min (by C-G formula based on SCr of 0.82 mg/dL).  Hepatic:  Results from last 7 days   Lab Units 07/20/21  0450 07/19/21  0419 07/18/21  0522 07/17/21  0119 07/16/21  0402 07/15/21  0603 07/14/21  0330   ALK PHOS U/L 92 91 102 117 124* 110 123*   BILIRUBIN mg/dL 1.0 0.9 0.9 1.2 1.8* 2.9* 2.9*   ALT (SGPT) U/L 56* 64* 77* 83* 57* 28 43*   AST (SGOT) U/L 22 22 34 54* 44* 15 21     Arterial Blood Gases:  Results from last 7 days   Lab Units 07/14/21  0307   PH, ARTERIAL pH units 7.487*   PCO2, ARTERIAL mm Hg 54.9*   PO2 ART mm Hg 62.9*   FIO2 % 100             Lab Results   Component Value Date    LACTATE 1.0 07/15/2021       Relevant imaging studies and labs from 07/20/21 were reviewed and interpreted by me    Medications (drips):       allopurinol, 100 mg, Oral, Daily  aspirin, 81 mg, Oral, Daily  bumetanide, 1 mg, Oral, Daily  carvedilol, 12.5 mg, Oral, Q12H  colchicine, 1.2 mg, Oral, BID  doxycycline, 100 mg, Oral, Q12H  enoxaparin, 30 mg, Subcutaneous, Q12H  fluticasone, 2 spray, Each Nare, BID  insulin detemir, 10 Units, Subcutaneous, Daily  insulin lispro, 0-7 Units, Subcutaneous, 4x Daily With Meals & Nightly  ipratropium-albuterol, 3 mL, Nebulization, Q4H - RT  lactobacillus acidophilus, 1 capsule, Oral, Daily  losartan, 50 mg, Oral, Q24H  methylPREDNISolone sodium succinate, 80 mg, Intravenous, Q8H  pantoprazole, 40 mg, Oral, Q AM  pharmacy consult - MTM, , Does not apply, Daily  piperacillin-tazobactam, 4.5 g, Intravenous, Q8H  sodium chloride, 10 mL, Intravenous, Q12H        Assessment/Plan   IMPRESSION / PLAN     Inpatient Problem List:  45 y.o.male:  Active Hospital Problems    Diagnosis    • **Acute on chronic respiratory failure with hypoxia and hypercapnia (CMS/HCC)     • Acute on chronic diastolic congestive heart failure (CMS/HCC)      · Echo 7-12-21: Left ventricular wall thickness is consistent with moderate to severe concentric hypertrophy. Left ventricular ejection fraction appears to be 51 - 55%.     • Mixed hyperlipidemia    • Prediabetes    • Acute pulmonary edema (CMS/HCC)    • Hypertensive emergency    • Obstructive sleep apnea    • Obesity hypoventilation syndrome (CMS/HCC) -suspected    • Class 3 severe obesity due to excess calories with serious comorbidity and body mass index (BMI) of 40.0 to 44.9 in adult (CMS/HCC)    • Hypertension, essential         Impression:  45 y.o.male with relevant PMH of BMI 48, likely LEILANI / OHS, HTN, recently ran out of medication admitted 7/12/2021 with shortness of breath, LE edema, and uncontrolled HTN.  Found to have /125 and Severe Acute Hypoxemic & Hypercapnic Respiratory Failure requiring 100% Bipap.  CTA neg for PE, but did show bibasilar atelectasis / pneumonia, thickened LV myocardium, and enlarged PA.  No recent vomiting but has had severe reflux.    Overall appears to be a combination of Acute Diastolic HF and Bibasilar PNA / Atelectasis.  Suspect some degree of chronic Hypercapnic Respiratory Failure as pH nearly normal with PCO2 70 and BE +8.1.  Echo w/ EF 51-55%, mod to severe concentric hypertrophy, mild RV dilation, normal RVSP.  Patient Mother has h/o HOCM.    Despite aggressive treatment patient remained profoundly hypoxemic, but breathing comfortably.  No shunt on Echo.  No obvious occupational, recreational, or infectious exposures.    Has slowly improved with aggressive diuresis, steroids, and abx.  Inflammatory markers unusually high.    Plan:  Severe Acute Hypoxemic Respiratory Failure  AoC Hypercapnic Respiratory Failure  LEILANI  OHS  Acute Diastolic HF  Hypertensive Emergency  Bi-basilar Pneumonia / Atelectasis  Gout Flare  Elevated LFTs    Bipap hs and prn.  Off HFNC.  Continue po maintenance bumex - looks  euvolemic.  Empiric pip-tazo for pneumonia to complete 10 days.  Added doxy for atypical coverage 7/16 - would plan on at least 7 days.  Duoneb w/ metaneb for pulmonary toilet. Empiric reflux treatment w/ PPI as patient does recall having severe reflux prior to admission.  D/c'd vanco - MRSA PCR negative.  Tight BP control.  Coreg for DHF, mod to severe concentric LVH.  Family h/o HOCM.  Added ARB - BP still not controlled.  Euvolemic.      If patient ends up having LHC would benefit from having RHC as well to exclude PAH.    Continue colchicine and allopurinol for gout flare.  Started IV solumedrol 7/15.      Inflammatory markers are elevated.  Continue high dose solumedrol - increased dose 7/19 and patient seems to have responded well to this.  Auto-immune labs and other serologies so far negative.  Based on how he has behaved clinically plan to taper steroids slowly.    Will need Trilogy NIPPV or Bipap on discharge and close pulmonary / cardiology followup.    Replace electrolytes prn    Appreciate Cardiology help.    DVT / PUD prophylaxis    Nutrition - Diet Regular; Cardiac     PT/OT    To tele    Plan of care and goals reviewed with multidisciplinary team at daily rounds    High Risk secondary to severe hypoxemic / hypercapnic respiratory failure, Acute DHF, Hypertensive Emergency, very high risk for decompensation / mechanical ventilation / etc.           Tato Whitaker MD  Intensive Care Medicine  07/20/21 16:03 EDT

## 2021-07-20 NOTE — CASE MANAGEMENT/SOCIAL WORK
Continued Stay Note  [facility]     Patient Name: Norberto Keita  MRN: [id number]  Today's Date: 7/20/2021    Admit Date: 7/12/2021    Discharge Plan     Row Name 07/20/21 1559       Plan    Plan Comments  Consult received for trilogy unit.  Spoke with Dr. Whitaker and updated that patient had Zoom appointment with Dr. Hardin on 7/15/21 and that Dr. Hardin's office is setting up home BiPAP.  Dr. Whitaker ok with that but wants to make sure it is in place when patient is discharged.  Spoke with patient at bedside.  Patient states he had just spoken with Dr. Hardin's office and theya re sending all the information to [employer].  Spoke with Sarahi with [employer] at [phone] and she states as of this time they have not received any referral or orders but will keep loking and let CM know.  Updated Sarahi that patient had been transferre to telemetry and to speak with CM covering that unit after today.  Call placed to Dr. Hardin's office at [phone] and spoke with Daysi.  Daysi states everything was faxed this am around 1000.  Return call to Sarahi and message left to this affect.  CM will follow up with Sarahi with [facility] tomorrow.    Row Name 07/20/21 1427       Plan    Plan  Home    Plan Comments  Consult received for trilogy unit.  Spoke with Dr. Whitaker and updated that patient had Zoom appointment with Dr. Hardin on 7/15/21 and that Dr. Calderon office is setting up home BiPAP.  Dr. Whitaker ok with that but wants to make sure it is in place when patient is discharged.  Spoke with patient at bedside.  Patient states he had just spoken with  office and they are sending all the information to [facility].  Spoke with Sarahi with [employer] at [phone] and she states as of this time they have not received any referral or orders but will keep looking and let CM know.  Updated Sarahi that patient has been transferred to telemetry and to speak with CM covering that unit after today.  Call placed to  Dr. Hardin's office and had to leave message to call CM with information.  CM will continue to follow.        Discharge Codes    No documentation.       Expected Discharge Date and Time     Expected Discharge Date Expected Discharge Time    Jul 24, 2021             Yasmin Georges RN

## 2021-07-21 PROBLEM — J96.01 ACUTE RESPIRATORY FAILURE WITH HYPOXIA: Status: ACTIVE | Noted: 2021-07-12

## 2021-07-21 LAB
1,3 BETA GLUCAN SER-MCNC: <31 PG/ML
ALBUMIN SERPL-MCNC: 3.8 G/DL (ref 3.5–5.2)
ALBUMIN/GLOB SERPL: 1.3 G/DL
ALP SERPL-CCNC: 86 U/L (ref 39–117)
ALT SERPL W P-5'-P-CCNC: 47 U/L (ref 1–41)
ANION GAP SERPL CALCULATED.3IONS-SCNC: 12 MMOL/L (ref 5–15)
AST SERPL-CCNC: 17 U/L (ref 1–40)
BASOPHILS # BLD AUTO: 0.01 10*3/MM3 (ref 0–0.2)
BASOPHILS NFR BLD AUTO: 0.1 % (ref 0–1.5)
BILIRUB SERPL-MCNC: 0.8 MG/DL (ref 0–1.2)
BUN SERPL-MCNC: 21 MG/DL (ref 6–20)
BUN/CREAT SERPL: 26.9 (ref 7–25)
CALCIUM SPEC-SCNC: 9.3 MG/DL (ref 8.6–10.5)
CHLORIDE SERPL-SCNC: 99 MMOL/L (ref 98–107)
CO2 SERPL-SCNC: 25 MMOL/L (ref 22–29)
CREAT SERPL-MCNC: 0.78 MG/DL (ref 0.76–1.27)
CRP SERPL-MCNC: 0.36 MG/DL (ref 0–0.5)
DEPRECATED RDW RBC AUTO: 47 FL (ref 37–54)
EOSINOPHIL # BLD AUTO: 0 10*3/MM3 (ref 0–0.4)
EOSINOPHIL NFR BLD AUTO: 0 % (ref 0.3–6.2)
ERYTHROCYTE [DISTWIDTH] IN BLOOD BY AUTOMATED COUNT: 15.1 % (ref 12.3–15.4)
ERYTHROCYTE [SEDIMENTATION RATE] IN BLOOD: 21 MM/HR (ref 0–15)
GFR SERPL CREATININE-BSD FRML MDRD: 108 ML/MIN/1.73
GLOBULIN UR ELPH-MCNC: 2.9 GM/DL
GLUCOSE BLDC GLUCOMTR-MCNC: 129 MG/DL (ref 70–130)
GLUCOSE BLDC GLUCOMTR-MCNC: 147 MG/DL (ref 70–130)
GLUCOSE BLDC GLUCOMTR-MCNC: 158 MG/DL (ref 70–130)
GLUCOSE BLDC GLUCOMTR-MCNC: 186 MG/DL (ref 70–130)
GLUCOSE SERPL-MCNC: 165 MG/DL (ref 65–99)
HCT VFR BLD AUTO: 60.3 % (ref 37.5–51)
HGB BLD-MCNC: 17.9 G/DL (ref 13–17.7)
IGA SERPL-MCNC: 232 MG/DL (ref 90–386)
IGE SERPL-ACNC: 238 IU/ML (ref 6–495)
IGG SERPL-MCNC: 1159 MG/DL (ref 603–1613)
IGM SERPL-MCNC: 106 MG/DL (ref 20–172)
IMM GRANULOCYTES # BLD AUTO: 0.06 10*3/MM3 (ref 0–0.05)
IMM GRANULOCYTES NFR BLD AUTO: 0.6 % (ref 0–0.5)
LYMPHOCYTES # BLD AUTO: 0.96 10*3/MM3 (ref 0.7–3.1)
LYMPHOCYTES NFR BLD AUTO: 9.4 % (ref 19.6–45.3)
MAGNESIUM SERPL-MCNC: 2.2 MG/DL (ref 1.6–2.6)
MCH RBC QN AUTO: 26.6 PG (ref 26.6–33)
MCHC RBC AUTO-ENTMCNC: 29.7 G/DL (ref 31.5–35.7)
MCV RBC AUTO: 89.6 FL (ref 79–97)
MONOCYTES # BLD AUTO: 0.81 10*3/MM3 (ref 0.1–0.9)
MONOCYTES NFR BLD AUTO: 7.9 % (ref 5–12)
MVISTA(R) BLASTOMYCES AG: NORMAL NG/ML
NEUTROPHILS NFR BLD AUTO: 8.39 10*3/MM3 (ref 1.7–7)
NEUTROPHILS NFR BLD AUTO: 82 % (ref 42.7–76)
NRBC BLD AUTO-RTO: 0 /100 WBC (ref 0–0.2)
PHOSPHATE SERPL-MCNC: 4.5 MG/DL (ref 2.5–4.5)
PLATELET # BLD AUTO: 261 10*3/MM3 (ref 140–450)
PMV BLD AUTO: 10.3 FL (ref 6–12)
POTASSIUM SERPL-SCNC: 4.3 MMOL/L (ref 3.5–5.2)
PROT SERPL-MCNC: 6.7 G/DL (ref 6–8.5)
QT INTERVAL: 384 MS
QTC INTERVAL: 453 MS
RBC # BLD AUTO: 6.73 10*6/MM3 (ref 4.14–5.8)
SODIUM SERPL-SCNC: 136 MMOL/L (ref 136–145)
SPECIMEN SOURCE: NORMAL
WBC # BLD AUTO: 10.23 10*3/MM3 (ref 3.4–10.8)

## 2021-07-21 PROCEDURE — 63710000001 INSULIN LISPRO (HUMAN) PER 5 UNITS: Performed by: NURSE PRACTITIONER

## 2021-07-21 PROCEDURE — 80053 COMPREHEN METABOLIC PANEL: CPT | Performed by: INTERNAL MEDICINE

## 2021-07-21 PROCEDURE — 97535 SELF CARE MNGMENT TRAINING: CPT

## 2021-07-21 PROCEDURE — 25010000002 METHYLPREDNISOLONE PER 40 MG: Performed by: INTERNAL MEDICINE

## 2021-07-21 PROCEDURE — 94799 UNLISTED PULMONARY SVC/PX: CPT

## 2021-07-21 PROCEDURE — 63710000001 INSULIN DETEMIR PER 5 UNITS: Performed by: INTERNAL MEDICINE

## 2021-07-21 PROCEDURE — 25010000002 PIPERACILLIN SOD-TAZOBACTAM PER 1 G: Performed by: INTERNAL MEDICINE

## 2021-07-21 PROCEDURE — 86140 C-REACTIVE PROTEIN: CPT | Performed by: INTERNAL MEDICINE

## 2021-07-21 PROCEDURE — 83735 ASSAY OF MAGNESIUM: CPT | Performed by: NURSE PRACTITIONER

## 2021-07-21 PROCEDURE — 82962 GLUCOSE BLOOD TEST: CPT

## 2021-07-21 PROCEDURE — 99233 SBSQ HOSP IP/OBS HIGH 50: CPT | Performed by: INTERNAL MEDICINE

## 2021-07-21 PROCEDURE — 97110 THERAPEUTIC EXERCISES: CPT

## 2021-07-21 PROCEDURE — 85652 RBC SED RATE AUTOMATED: CPT | Performed by: INTERNAL MEDICINE

## 2021-07-21 PROCEDURE — 94660 CPAP INITIATION&MGMT: CPT

## 2021-07-21 PROCEDURE — 84100 ASSAY OF PHOSPHORUS: CPT | Performed by: INTERNAL MEDICINE

## 2021-07-21 PROCEDURE — 25010000002 ENOXAPARIN PER 10 MG: Performed by: INTERNAL MEDICINE

## 2021-07-21 PROCEDURE — 97116 GAIT TRAINING THERAPY: CPT

## 2021-07-21 PROCEDURE — 85025 COMPLETE CBC W/AUTO DIFF WBC: CPT | Performed by: INTERNAL MEDICINE

## 2021-07-21 RX ORDER — IPRATROPIUM BROMIDE AND ALBUTEROL SULFATE 2.5; .5 MG/3ML; MG/3ML
3 SOLUTION RESPIRATORY (INHALATION)
Status: DISCONTINUED | OUTPATIENT
Start: 2021-07-21 | End: 2021-07-22 | Stop reason: HOSPADM

## 2021-07-21 RX ORDER — PREDNISONE 20 MG/1
40 TABLET ORAL
Status: DISCONTINUED | OUTPATIENT
Start: 2021-07-22 | End: 2021-07-22 | Stop reason: HOSPADM

## 2021-07-21 RX ADMIN — METHYLPREDNISOLONE SODIUM SUCCINATE 40 MG: 40 INJECTION, POWDER, FOR SOLUTION INTRAMUSCULAR; INTRAVENOUS at 05:35

## 2021-07-21 RX ADMIN — ASPIRIN 81 MG CHEWABLE TABLET 81 MG: 81 TABLET CHEWABLE at 09:10

## 2021-07-21 RX ADMIN — FLUTICASONE PROPIONATE 2 SPRAY: 50 SPRAY, METERED NASAL at 09:11

## 2021-07-21 RX ADMIN — BUMETANIDE 1 MG: 1 TABLET ORAL at 09:10

## 2021-07-21 RX ADMIN — LOSARTAN POTASSIUM 100 MG: 50 TABLET, FILM COATED ORAL at 09:10

## 2021-07-21 RX ADMIN — ENOXAPARIN SODIUM 30 MG: 30 INJECTION SUBCUTANEOUS at 20:38

## 2021-07-21 RX ADMIN — CARVEDILOL 12.5 MG: 12.5 TABLET, FILM COATED ORAL at 09:10

## 2021-07-21 RX ADMIN — INSULIN LISPRO 2 UNITS: 100 INJECTION, SOLUTION INTRAVENOUS; SUBCUTANEOUS at 12:18

## 2021-07-21 RX ADMIN — COLCHICINE 0.6 MG: 0.6 TABLET, FILM COATED ORAL at 09:10

## 2021-07-21 RX ADMIN — COLCHICINE 0.6 MG: 0.6 TABLET, FILM COATED ORAL at 20:38

## 2021-07-21 RX ADMIN — HYDROCODONE BITARTRATE AND ACETAMINOPHEN 1 TABLET: 5; 325 TABLET ORAL at 20:48

## 2021-07-21 RX ADMIN — TAZOBACTAM SODIUM AND PIPERACILLIN SODIUM 4.5 G: 500; 4 INJECTION, SOLUTION INTRAVENOUS at 22:34

## 2021-07-21 RX ADMIN — DOXYCYCLINE 100 MG: 100 CAPSULE ORAL at 09:10

## 2021-07-21 RX ADMIN — PANTOPRAZOLE SODIUM 40 MG: 40 TABLET, DELAYED RELEASE ORAL at 05:35

## 2021-07-21 RX ADMIN — ENOXAPARIN SODIUM 30 MG: 30 INJECTION SUBCUTANEOUS at 09:10

## 2021-07-21 RX ADMIN — TAZOBACTAM SODIUM AND PIPERACILLIN SODIUM 4.5 G: 500; 4 INJECTION, SOLUTION INTRAVENOUS at 05:46

## 2021-07-21 RX ADMIN — IPRATROPIUM BROMIDE AND ALBUTEROL SULFATE 3 ML: 2.5; .5 SOLUTION RESPIRATORY (INHALATION) at 19:15

## 2021-07-21 RX ADMIN — FLUTICASONE PROPIONATE 2 SPRAY: 50 SPRAY, METERED NASAL at 20:48

## 2021-07-21 RX ADMIN — CARVEDILOL 12.5 MG: 12.5 TABLET, FILM COATED ORAL at 20:38

## 2021-07-21 RX ADMIN — TAZOBACTAM SODIUM AND PIPERACILLIN SODIUM 4.5 G: 500; 4 INJECTION, SOLUTION INTRAVENOUS at 14:18

## 2021-07-21 RX ADMIN — INSULIN LISPRO 2 UNITS: 100 INJECTION, SOLUTION INTRAVENOUS; SUBCUTANEOUS at 20:38

## 2021-07-21 RX ADMIN — SODIUM CHLORIDE, PRESERVATIVE FREE 10 ML: 5 INJECTION INTRAVENOUS at 20:39

## 2021-07-21 RX ADMIN — INSULIN DETEMIR 10 UNITS: 100 INJECTION, SOLUTION SUBCUTANEOUS at 09:10

## 2021-07-21 RX ADMIN — Medication 1 CAPSULE: at 09:10

## 2021-07-21 RX ADMIN — ALLOPURINOL 100 MG: 100 TABLET ORAL at 09:10

## 2021-07-21 NOTE — PLAN OF CARE
Problem: Adult Inpatient Plan of Care  Goal: Plan of Care Review  Outcome: Ongoing, Progressing  Flowsheets (Taken 7/21/2021 0355)  Outcome Summary: No acute changes. Patient remained in NSR with some st elevation. VS stable and patient on 4LNC but wore bipap at night.   Goal Outcome Evaluation:              Outcome Summary: No acute changes. Patient remained in NSR with some st elevation. VS stable and patient on 4LNC but wore bipap at night.

## 2021-07-21 NOTE — THERAPY TREATMENT NOTE
Patient Name: Norberto Keita  : 1975    MRN: [id number]                              Today's Date: 2021       Admit Date: 2021    Visit Dx:     ICD-10-CM ICD-9-CM   1. Acute respiratory failure with hypoxia and hypercarbia (CMS/HCC)  J96.01 518.81    J96.02      Patient Active Problem List   Diagnosis   • Gout of big toe   • Generalized anxiety disorder   • Hypertension, essential   • Reduced libido   • Hypogonadism in male   • Class 3 severe obesity due to excess calories with serious comorbidity and body mass index (BMI) of 40.0 to 44.9 in adult (CMS/HCC)   • Acute on chronic respiratory failure with hypoxia and hypercapnia (CMS/HCC)   • Acute pulmonary edema (CMS/HCC)   • Hypertensive emergency   • Obstructive sleep apnea   • Obesity hypoventilation syndrome (CMS/[facility]) -suspected   • Acute on chronic diastolic congestive heart failure (CMS/HCC)   • Mixed hyperlipidemia   • Prediabetes     Past Medical History:   Diagnosis Date   • Sacroiliitis (CMS/HCC)      Past Surgical History:   Procedure Laterality Date   • HYDROCELE EXCISION / REPAIR       Surgery [place] Vaginalis Excision of Hydrocele   • KNEE SURGERY Left    • VASECTOMY       General Information     Row Name 21 1300          OT Time and Intention    Document Type  therapy note (daily note)  -MEE     Mode of Treatment  individual therapy;occupational therapy  -MEE     Row Name 21 1300          General Information    Patient Profile Reviewed  yes  -MEE     Existing Precautions/Restrictions  oxygen therapy device and L/min  -MEE     Barriers to Rehab  medically complex  -MEE     Row Name 21 1300          Cognition    Orientation Status (Cognition)  oriented to;person  -MEE     Row Name 21 1300          Safety Issues, Functional Mobility    Impairments Affecting Function (Mobility)  endurance/activity tolerance  -MEE       User Key  (r) = Recorded By, (t) = Taken By, (c) = Cosigned By    Initials Name Provider Type    MEE  Stephie Webb, OT Occupational Therapist          Mobility/ADL's     Row Name 07/21/21 1301          Bed Mobility    Comment (Bed Mobility)  UIC on arrival  -     Row Name 07/21/21 1301          Transfers    Sit-Stand [place] (Transfers)  independent  -     Row Name 07/21/21 1301          Functional Mobility    Functional Mobility- Ind. Level  independent  -     Functional Mobility- Device  other (see comments) none  -MEE     Functional Mobility-Distance (Feet)  40  -MEE     Functional Mobility- Safety Issues  supplemental O2  -MEE     Functional Mobility- Comment  02 managed line only  -     Row Name 07/21/21 1301          Activities of Daily Living    BADL Assessment/Intervention  grooming  -     Row Name 07/21/21 1301          Grooming Assessment/Training    [place] Level (Grooming)  hair care, combing/brushing;wash face, hands;oral care regimen;independent  -MEE     Position (Grooming)  sink side  -       User Key  (r) = Recorded By, (t) = Taken By, (c) = Cosigned By    Initials Name Provider Type     Stephie Webb, OT Occupational Therapist        Obj/Interventions     Row Name 07/21/21 1302          Shoulder (Therapeutic Exercise)    Shoulder AROM (Therapeutic Exercise)  bilateral;flexion;extension;horizontal aBduction/aDduction;10 repetitions up to 20 reps, diaphragmatic breathing also 10 reps, educated on shoulder abd and shoulder retraction with PLB also  -     Row Name 07/21/21 1302          Motor Skills    Functional Endurance  02 sats in 90's throughout session  -     Row Name 07/21/21 1302          Balance    Static Sitting Balance  WFL;unsupported;sitting in chair  -MEE     Dynamic Sitting Balance  WFL;unsupported;sitting in chair  -MEE     Dynamic Standing Balance  WFL;unsupported;standing  -MEE     Comment, Balance  up on toes, hip abd, mini squats and high step with one hand just guarding on counter with good 02 sats and no unsteadiness 10 reps each  -     Row Name  07/21/21 1302          Therapeutic Exercise    Therapeutic Exercise  shoulder focused on PLB  -MEE       User Key  (r) = Recorded By, (t) = Taken By, (c) = Cosigned By    Initials Name Provider Type    Stephie Randhawa, LUIS Occupational Therapist        Goals/Plan     Row Name 07/21/21 1308          Transfer Goal 1 (OT)    Progress/Outcome (Transfer Goal 1, OT)  goal met pt. up from surface same height as toilet, goal adequately met  -MEE     Row Name 07/21/21 1308          Dressing Goal 1 (OT)    Progress/Outcome (Dressing Goal 1, OT)  goal met  -MEE     Row Name 07/21/21 1308          Strength Goal 1 (OT)    Progress/Outcome (Strength Goal 1, OT)  good progress toward goal  -MEE       User Key  (r) = Recorded By, (t) = Taken By, (c) = Cosigned By    Initials Name Provider Type    Stephie Randhawa, LUIS Occupational Therapist        Clinical Impression     Row Name 07/21/21 1304          Pain Assessment    Additional Documentation  Pain Scale: Numbers Pre/Post-Treatment (Group)  -MEE     Row Name 07/21/21 1304          Pain Scale: Numbers Pre/Post-Treatment    Pretreatment Pain Rating  0/10 - no pain  -MEE     Posttreatment Pain Rating  0/10 - no pain  -[facility] Name 07/21/21 1304          Plan of Care Review    Plan of Care Reviewed With  patient  -MEE     Progress  improving  -MEE     Outcome Summary  Pt. was able to complete UE TE with focus on PLB, balance TE and grooming standing sink side on 4L with 02 sats 89% or greater.  02 saturation monitor on toe changed half way through session due to loose so question full accuracy. Pt. met 2/3 goals.  Will follow one more session pending discharge.  -MEE     Row Name 07/21/21 1304          Therapy Assessment/Plan (OT)    Therapy Frequency (OT)  3 times/wk  -MEE     Row Name 07/21/21 1304          Therapy Plan Review/Discharge Plan (OT)    Anticipated Discharge Disposition (OT)  home;home with outpatient therapy services possible pulmonary TE pending progress with 02 use   -MEE     Row Name 07/21/21 1304          Vital Signs    Pre Systolic BP Rehab  146  -MEE     Pre Treatment Diastolic BP  92  -MEE     Post Systolic BP Rehab  156  -MEE     Post Treatment Diastolic BP  106  -MEE     Pretreatment Heart Rate (beats/min)  81  -MEE     Posttreatment Heart Rate (beats/min)  85  -MEE     Pre SpO2 (%)  89  -MEE     O2 Delivery Pre Treatment  nasal cannula  -MEE     Intra SpO2 (%)  89 lowest noted  -MEE     O2 Delivery Intra Treatment  nasal cannula  -MEE     Post SpO2 (%)  93  -MEE     O2 Delivery Post Treatment  nasal cannula  -MEE     Pre Patient Position  Supine  -MEE     Intra Patient Position  Standing  -MEE     Post Patient Position  Sitting  -MEE     Row Name 07/21/21 1304          Positioning and Restraints    Pre-Treatment Position  sitting in chair/recliner  -MEE     Post Treatment Position  chair  -MEE     In Chair  sitting;call light within reach;encouraged to call for assist  -MEE       User Key  (r) = Recorded By, (t) = Taken By, (c) = Cosigned By    Initials Name Provider Type    Stepihe Randhawa, OT Occupational Therapist        Outcome Measures     Row Name 07/21/21 1308          How much help from another is currently needed...    Putting on and taking off regular lower body clothing?  4  -MEE     Bathing (including washing, rinsing, and drying)  4  -MEE     Toileting (which includes using toilet bed pan or urinal)  4  -MEE     Putting on and taking off regular upper body clothing  4  -MEE     Taking care of personal grooming (such as brushing teeth)  4  -MEE     Eating meals  4  -MEE     AM-PAC 6 Clicks Score (OT)  24  -MEE     Row Name 07/21/21 0842          How much help from another person do you currently need...    Turning from your back to your side while in flat bed without using bedrails?  4  -NS     Moving from lying on back to sitting on the side of a flat bed without bedrails?  4  -NS     Moving to and from a bed to a chair (including a wheelchair)?  4  -NS     Standing up from a  chair using your arms (e.g., wheelchair, bedside chair)?  4  -NS     Climbing 3-5 steps with a railing?  3  -NS     To walk in hospital room?  3  -NS     AM-PAC 6 Clicks Score (PT)  22  -NS     Row Name 07/21/21 1308 07/21/21 0842       Functional Assessment    Outcome Measure Options  AM-PAC 6 Clicks Daily Activity (OT)  -MEE  AM-PAC 6 Clicks Basic Mobility (PT)  -NS      User Key  (r) = Recorded By, (t) = Taken By, (c) = Cosigned By    Initials Name Provider Type    Stephie Randhawa, OT Occupational Therapist    Brooke Benoit, PT Physical Therapist          Occupational Therapy Education                 Title: PT OT SLP Therapies (Done)     Topic: Occupational Therapy (Done)     Point: ADL training (Done)     Description:   Instruct learner(s) on proper safety adaptation and remediation techniques during self care or transfers.   Instruct in proper use of assistive devices.              Learning Progress Summary           Patient Acceptance, E,D, VU,NR by  at 7/21/2021 1309    Comment: pulmonary TE with PLB, progress to goals    Acceptance, E,TB, VU by  at 7/19/2021 1711    Acceptance, E,D, VU,DU by  at 7/18/2021 1010    Comment: EC and activity pacing, BUE AROM w/ PLB    Acceptance, E, NR by CL at 7/16/2021 1306                   Point: Home exercise program (Done)     Description:   Instruct learner(s) on appropriate technique for monitoring, assisting and/or progressing therapeutic exercises/activities.              Learning Progress Summary           Patient Acceptance, E,D, VU,NR by MEE at 7/21/2021 1309    Comment: pulmonary TE with PLB, progress to goals    Acceptance, E,TB, VU by SS at 7/19/2021 1711    Acceptance, E,D, VU,DU by CS at 7/18/2021 1010    Comment: EC and activity pacing, BUE AROM w/ PLB    Acceptance, E, NR by CL at 7/16/2021 1306                   Point: Precautions (Done)     Description:   Instruct learner(s) on prescribed precautions during self-care and functional transfers.               Learning Progress Summary           Patient Acceptance, E,TB, VU by SS at 7/19/2021 1711    Acceptance, E,D, VU,DU by CS at 7/18/2021 1010    Comment: EC and activity pacing, BUE AROM w/ PLB    Acceptance, E, NR by CL at 7/16/2021 1306                   Point: Body mechanics (Done)     Description:   Instruct learner(s) on proper positioning and spine alignment during self-care, functional mobility activities and/or exercises.              Learning Progress Summary           Patient Acceptance, E,D, VU,NR by MEE at 7/21/2021 1309    Comment: pulmonary TE with PLB, progress to goals    Acceptance, E,TB, VU by SS at 7/19/2021 1711    Acceptance, E,D, VU,DU by CS at 7/18/2021 1010    Comment: EC and activity pacing, BUE AROM w/ PLB    Acceptance, E, NR by CL at 7/16/2021 1306                               User Key     Initials Effective Dates Name Provider Type Discipline    MEE 06/16/21 -  Stephie Webb, OT Occupational Therapist OT    SS 06/16/21 -  Patricia Godinez, RN Registered Nurse Nurse    CL 04/03/18 -  Lakeisha Arceo OT Occupational Therapist OT     06/16/21 -  Gabriel Bills OT Occupational Therapist OT              OT Recommendation and Plan  Therapy Frequency (OT): 3 times/wk  Plan of Care Review  Plan of Care Reviewed With: patient  Progress: improving  Outcome Summary: Pt. was able to complete UE TE with focus on PLB, balance TE and grooming standing sink side on 4L with 02 sats 89% or greater.  02 saturation monitor on toe changed half way through session due to loose so question full accuracy. Pt. met 2/3 goals.  Will follow one more session pending discharge.     Time Calculation:   Time Calculation- OT     Row Name 07/21/21 1309 07/21/21 0842          Time Calculation- OT    OT Start Time  1131  -MEE  --     OT Received On  07/21/21 -JB  --     OT Goal Re-Cert Due Date  07/26/21 -JB  --        Timed Charges    68760 - OT Therapeutic Exercise Minutes  15  -JB  --     86338 - Gait Training  Minutes   --  25  -NS     56072 - OT Therapeutic Activity Minutes  6  -MEE  --     26993 - OT Self Care/Mgmt Minutes  6  -MEE  --        Total Minutes    Timed Charges Total Minutes  27  -MEE  25  -NS      Total Minutes  27  -MEE  25  -NS       User Key  (r) = Recorded By, (t) = Taken By, (c) = Cosigned By    Initials Name Provider Type    Stephie Randhawa, OT Occupational Therapist    Brooke Benoit, PT Physical Therapist        Therapy Charges for Today     Code Description Service Date Service Provider Modifiers Qty    46369071691  OT THER PROC EA 15 MIN 7/21/2021 Stephie Webb, OT GO 1    [id number]  OT SELF CARE/MGMT/TRAIN EA 15 MIN 7/21/2021 Stephie Webb OT GO 1               Stephie Webb OT  7/21/2021

## 2021-07-21 NOTE — THERAPY TREATMENT NOTE
Patient Name: Norberto Keita  : 1975    MRN: [id number]                              Today's Date: 2021       Admit Date: 2021    Visit Dx:     ICD-10-CM ICD-9-CM   1. Acute respiratory failure with hypoxia and hypercarbia (CMS/HCC)  J96.01 518.81    J96.02      Patient Active Problem List   Diagnosis   • Gout of big toe   • Generalized anxiety disorder   • Hypertension, essential   • Reduced libido   • Hypogonadism in male   • Class 3 severe obesity due to excess calories with serious comorbidity and body mass index (BMI) of 40.0 to 44.9 in adult (CMS/HCC)   • Acute on chronic respiratory failure with hypoxia and hypercapnia (CMS/HCC)   • Acute pulmonary edema (CMS/HCC)   • Hypertensive emergency   • Obstructive sleep apnea   • Obesity hypoventilation syndrome (CMS/HCC) -suspected   • Acute on chronic diastolic congestive heart failure (CMS/HCC)   • Mixed hyperlipidemia   • Prediabetes     Past Medical History:   Diagnosis Date   • Sacroiliitis (CMS/HCC)      Past Surgical History:   Procedure Laterality Date   • HYDROCELE EXCISION / REPAIR       Surgery [place] Vaginalis Excision of Hydrocele   • KNEE SURGERY Left    • VASECTOMY       General Information     Row Name 21 0842          Physical Therapy Time and Intention    Document Type  therapy note (daily note)  -NS     Mode of Treatment  physical therapy  -NS     Row Name 21 0842          General Information    Patient Profile Reviewed  yes  -NS     Existing Precautions/Restrictions  oxygen therapy device and L/min  -NS     Row Name 21 0842          Cognition    Orientation Status (Cognition)  oriented x 4  -NS     Row Name 21 0842          Safety Issues, Functional Mobility    Impairments Affecting Function (Mobility)  endurance/activity tolerance;shortness of breath  -NS       User Key  (r) = Recorded By, (t) = Taken By, (c) = Cosigned By    Initials Name Provider Type    Brooke Benoit PT Physical Therapist         Mobility     Row Name 07/21/21 0842          Bed Mobility    Comment (Bed Mobility)  Not assessed; pt UIC  -NS     Row Name 07/21/21 0842          Transfers    Comment (Transfers)  No difficulty or LOB  -NS     [facility] Name 07/21/21 0842          Sit-Stand Transfer    Sit-Stand [place] (Transfers)  independent  -NS     [facility] Name 07/21/21 0842          Gait/Stairs (Locomotion)    [place] Level (Gait)  standby assist  -NS     Distance in Feet (Gait)  840  -NS     Deviations/Abnormal Patterns (Gait)  base of support, wide  -NS     Bilateral Gait Deviations  heel strike decreased  -NS     Comment (Gait/Stairs)  Pt ambulated around unit x2, demonstrating wide ESTRELLA. Noted O2 desat- lowest 90% on 4L O2, requiring cues for PLB. No LOB with marching or backwards walking.  -NS       User Key  (r) = Recorded By, (t) = Taken By, (c) = Cosigned By    Initials Name Provider Type    Brooke Benoit PT Physical Therapist        Obj/Interventions     [facility] Name 07/21/21 0842          Motor Skills    Motor Skills  therapeutic exercise  -NS     Therapeutic Exercise  hip;knee;ankle  -NS     [facility] Name 07/21/21 0842          Hip (Therapeutic Exercise)    Hip (Therapeutic Exercise)  strengthening exercise  -NS     Hip Strengthening (Therapeutic Exercise)  bilateral;marching while seated;marching while standing;10 repetitions  -NS     [facility] Name 07/21/21 0842          Knee (Therapeutic Exercise)    Knee (Therapeutic Exercise)  strengthening exercise  -NS     Knee Strengthening (Therapeutic Exercise)  bilateral;LAQ (long arc quad);10 repetitions  -NS     Row Name 07/21/21 0842          Ankle (Therapeutic Exercise)    Ankle (Therapeutic Exercise)  AROM (active range of motion)  -NS     Ankle AROM (Therapeutic Exercise)  bilateral;dorsiflexion;plantarflexion;10 repetitions  -[facility] Name 07/21/21 0842          Balance    Balance Assessment  sitting static balance;sitting dynamic balance;standing static balance;standing dynamic balance   -NS     Static Sitting Balance  WFL;unsupported;sitting in chair  -NS     Dynamic Sitting Balance  WFL;unsupported;sitting in chair  -NS     Static Standing Balance  WFL;unsupported;standing  -NS     Dynamic Standing Balance  WFL;unsupported;standing  -NS       User Key  (r) = Recorded By, (t) = Taken By, (c) = Cosigned By    Initials Name Provider Type    Brooke Benoit PT Physical Therapist        Goals/Plan    No documentation.       Clinical Impression     Row Name 07/21/21 0842          Pain    Additional Documentation  Pain Scale: Numbers Pre/Post-Treatment (Group)  -NS     Row Name 07/21/21 0842          Pain Scale: Numbers Pre/Post-Treatment    Pretreatment Pain Rating  0/10 - no pain  -NS     Posttreatment Pain Rating  0/10 - no pain  -NS     Row Name 07/21/21 0842          Plan of Care Review    Plan of Care Reviewed With  patient  -NS     Progress  improving  -NS     Outcome Summary  Patient demonstrating significant improvement in activity tolerance this session, increasing ambulation distance to 2 laps around the unit without AD and SBA on 4L O2, with lowest noted O2 sat at 90%, requiring cues for PLB to raise. Will plan to see pt 1-2 more sessions for stair training then plan to transition to nursing-led mobility. Will continue per PT POC.  -NS     Row Name 07/21/21 0842          Vital Signs    Pre SpO2 (%)  95  -NS     O2 Delivery Pre Treatment  nasal cannula  -NS     Intra SpO2 (%)  90  -NS     O2 Delivery Intra Treatment  nasal cannula  -NS     Post SpO2 (%)  90  -NS     O2 Delivery Post Treatment  nasal cannula  -NS     Pre Patient Position  Sitting  -NS     Intra Patient Position  Standing  -NS     Post Patient Position  Sitting  -NS     Row Name 07/21/21 0842          Positioning and Restraints    Pre-Treatment Position  sitting in chair/recliner  -NS     Post Treatment Position  chair  -NS     In Chair  notified nsg;sitting;call light within reach;encouraged to call for assist;with nsg  -NS        User Key  (r) = Recorded By, (t) = Taken By, (c) = Cosigned By    Initials Name Provider Type    Brooke Benoit PT Physical Therapist        Outcome Measures     Row Name 07/21/21 0842          How much help from another person do you currently need...    Turning from your back to your side while in flat bed without using bedrails?  4  -NS     Moving from lying on back to sitting on the side of a flat bed without bedrails?  4  -NS     Moving to and from a bed to a chair (including a wheelchair)?  4  -NS     Standing up from a chair using your arms (e.g., wheelchair, bedside chair)?  4  -NS     Climbing 3-5 steps with a railing?  3  -NS     To walk in hospital room?  3  -NS     AM-PAC 6 Clicks Score (PT)  22  -NS     Row Name 07/21/21 0842          Functional Assessment    Outcome Measure Options  AM-PAC 6 Clicks Basic Mobility (PT)  -NS       User Key  (r) = Recorded By, (t) = Taken By, (c) = Cosigned By    Initials Name Provider Type    Brooke Benoit PT Physical Therapist                       Physical Therapy Education                 Title: PT OT SLP Therapies (Done)     Topic: Physical Therapy (Done)     Point: Mobility training (Done)     Learning Progress Summary           Patient Acceptance, E, VU,DU by NS at 7/21/2021 0925    Acceptance, E,TB, VU by LUCIE at 7/19/2021 1711    Acceptance, E, VU,NR by MEE at 7/17/2021 1315    Acceptance, E,TB, VU,NR by EMEKA at 7/16/2021 1045                   Point: Home exercise program (Done)     Learning Progress Summary           Patient Acceptance, E, VU,DU by NS at 7/21/2021 0925    Acceptance, E,TB, VU by LUCIE at 7/19/2021 1711                   Point: Body mechanics (Done)     Learning Progress Summary           Patient Acceptance, E, VU,DU by NS at 7/21/2021 0925    Acceptance, E,TB, VU by SS at 7/19/2021 1711    Acceptance, E, VU,NR by MEE at 7/17/2021 1315    Acceptance, E,TB, VU,NR by EMEKA at 7/16/2021 1045                   Point: Precautions (Done)     Learning  Progress Summary           Patient Acceptance, E, VU,DU by NS at 7/21/2021 0925    Acceptance, E,TB, VU by SS at 7/19/2021 1711    Acceptance, E, VU,NR by MEE at 7/17/2021 1315    Acceptance, E,TB, VU,NR by AY at 7/16/2021 1045                               User Key     Initials Effective Dates Name Provider Type Discipline     06/16/21 -  Patricia Godinez, RN Registered Nurse Nurse    MEE 06/16/21 -  Lashaun Mckeon, PT Physical Therapist PT    NS 06/16/21 -  Brooke Hazel, MAXIMO Physical Therapist PT    EMEKA 11/10/20 -  Zulema Mills, PT Physical Therapist PT              PT Recommendation and Plan     Plan of Care Reviewed With: patient  Progress: improving  Outcome Summary: Patient demonstrating significant improvement in activity tolerance this session, increasing ambulation distance to 2 laps around the unit without AD and SBA on 4L O2, with lowest noted O2 sat at 90%, requiring cues for PLB to raise. Will plan to see pt 1-2 more sessions for stair training then plan to transition to nursing-led mobility. Will continue per PT POC.     Time Calculation:   PT Charges     Row Name 07/21/21 0842             Time Calculation    Start Time  0842  -NS      PT Received On  07/21/21  -NS      PT Goal Re-Cert Due Date  07/26/21  -NS         Timed Charges    24509 - Gait Training Minutes   25  -NS         Total Minutes    Timed Charges Total Minutes  25  -NS       Total Minutes  25  -NS        User Key  (r) = Recorded By, (t) = Taken By, (c) = Cosigned By    Initials Name Provider Type    Brooke Benoit, MAXIMO Physical Therapist        Therapy Charges for Today     Code Description Service Date Service Provider Modifiers Qty    22767387124 HC GAIT TRAINING EA 15 MIN 7/21/2021 Brooke Hazel, PT GP 2          PT G-Codes  Outcome Measure Options: AM-PAC 6 Clicks Basic Mobility (PT)  AM-PAC 6 Clicks Score (PT): 22  AM-PAC 6 Clicks Score (OT): 22    Brooke Hazel PT  7/21/2021

## 2021-07-21 NOTE — CASE MANAGEMENT/SOCIAL WORK
Continued Stay Note  [facility]     Patient Name: Norberto Keita  MRN: [id number]  Today's Date: 7/21/2021    Admit Date: 7/12/2021    Discharge Plan     Row Name 07/21/21 1541       Plan    Plan  update    Patient/Family in Agreement with Plan  yes    Plan Comments  Per previous CM notes, patient must have a BIPAP arranged prior to discharge.  Spoke to Daysi with [employer] who indicates that a sleep study will be required to approve patient for a BIPAP but did qualifying labs for Bilevel noninvasive vent, Trilogy.  Order provided to CM to have signed by physician.  Spoke with patient at bedside regarding discharge plan and advised that CM is working with [employer] to get approval for him and will advise him as soon as this happens.  Patient verbalizes understading and agreement with plan.  No new discharge needs verbalized.  CM following.  Patient plan is to discharge home via car with family to transprot when Trilogy approved.    Final Discharge Disposition Code  01 - home or self-care        Discharge Codes    No documentation.       Expected Discharge Date and Time     Expected Discharge Date Expected Discharge Time    Jul 24, 2021             Sammie Singletary, RN

## 2021-07-21 NOTE — PLAN OF CARE
Goal Outcome Evaluation:  Plan of Care Reviewed With: patient        Progress: improving  Outcome Summary: Pt. was able to complete UE TE with focus on PLB, balance TE and grooming standing sink side on 4L with 02 sats 89% or greater.  02 saturation monitor on toe changed half way through session due to loose so question full accuracy. Pt. met 2/3 goals.  Will follow one more session pending discharge.

## 2021-07-21 NOTE — PROGRESS NOTES
"[facility] Cardiology   IP Progress Note   LOS: 9 days   Patient Care Team:  Cam Cheung MD as PCP - General    Chief Complaint: Follow up for Diastolic CHF/HHD    Subjective    Much better on less O2, no CP, breathing is better.        Active Hospital Problems    Diagnosis    • **Acute on chronic respiratory failure with hypoxia and hypercapnia (CMS/HCC)    • Acute on chronic diastolic congestive heart failure (CMS/HCC)      · Echo 7-12-21: Left ventricular wall thickness is consistent with moderate to severe concentric hypertrophy. Left ventricular ejection fraction appears to be 51 - 55%.     • Hypertensive emergency    • Mixed hyperlipidemia    • Hypertension, essential    • Prediabetes    • Class 3 severe obesity due to excess calories with serious comorbidity and body mass index (BMI) of 40.0 to 44.9 in adult (CMS/HCC)    • Acute pulmonary edema (CMS/HCC)    • Obstructive sleep apnea    • Obesity hypoventilation syndrome (CMS/HCC) -suspected      Tele: Sinus Rythym    Vitals:  /92 (BP Location: Left arm, Patient Position: Sitting)   Pulse 80   Temp 98.4 °F (36.9 °C) (Oral)   Resp 21   Ht 185.4 cm (72.99\")   Wt (!) 170 kg (374 lb 12.5 oz)   SpO2 95%   BMI 49.46 kg/m²    Body mass index is 49.46 kg/m².    Intake/Output Summary (Last 24 hours) at 7/21/2021 1140  Last data filed at 7/21/2021 0758  Gross per 24 hour   Intake 2045 ml   Output 4500 ml   Net -2455 ml       Physical Exam:  General: No apparent distress.  Neck: no JVD.  Chest:No respiratory distress, breath sounds are normal. No wheezes,  rhonchi or rales.  Cardiovascular: Normal S1 and S2, no murmur, gallop or rub.    Extremities: No significant edema.    Results Review:         Labs:  Results from last 7 days   Lab Units 07/21/21  0624   WBC 10*3/mm3 10.23   HEMOGLOBIN g/dL 17.9*   HEMATOCRIT % 60.3*   PLATELETS 10*3/mm3 261     Results from last 7 days   Lab Units 07/21/21  0624 07/20/21  0450 07/20/21  0450 " 07/19/21 0419 07/19/21 0419   SODIUM mmol/L 136   < > 135*   < > 135*   POTASSIUM mmol/L 4.3   < > 4.4   < > 4.5   CHLORIDE mmol/L 99   < > 95*   < > 96*   CO2 mmol/L 25.0   < > 28.0   < > 24.0   BUN mg/dL 21*   < > 21*   < > 21*   CREATININE mg/dL 0.78   < > 0.82   < > 0.80   GLUCOSE mg/dL 165*   < > 189*   < > 161*   CALCIUM mg/dL 9.3   < > 9.5   < > 9.3   ALT (SGPT) U/L 47*   < > 56*   < > 64*   AST (SGOT) U/L 17  --  22  --  22    < > = values in this interval not displayed.     Estimated Creatinine Clearance: 198.3 mL/min (by C-G formula based on SCr of 0.78 mg/dL).  Lab Results   Component Value Date    HGBA1C 6.1 06/02/2021         Results from last 7 days   Lab Units 07/19/21 0419 07/16/21 0402   PROBNP pg/mL 72.0 55.8     Lab Results   Component Value Date    CHLPL 130 06/02/2021    TRIG 114 06/02/2021    HDL 36 (L) 06/02/2021    LDL 73 06/02/2021         COVID19   Date Value Ref Range Status   07/16/2021 Not Detected Not Detected - Ref. Range Final         Scheduled Meds:  allopurinol, 100 mg, Oral, Daily  aspirin, 81 mg, Oral, Daily  bumetanide, 1 mg, Oral, Daily  carvedilol, 12.5 mg, Oral, Q12H  colchicine, 0.6 mg, Oral, BID  doxycycline, 100 mg, Oral, Q12H  enoxaparin, 30 mg, Subcutaneous, Q12H  fluticasone, 2 spray, Each Nare, BID  insulin detemir, 10 Units, Subcutaneous, Daily  insulin lispro, 0-7 Units, Subcutaneous, 4x Daily With Meals & Nightly  ipratropium-albuterol, 3 mL, Nebulization, Q4H - RT  lactobacillus acidophilus, 1 capsule, Oral, Daily  losartan, 100 mg, Oral, Q24H  pantoprazole, 40 mg, Oral, Q AM  pharmacy consult - MTM, , Does not apply, Daily  piperacillin-tazobactam, 4.5 g, Intravenous, Q8H  [START ON 7/22/2021] predniSONE, 40 mg, Oral, Daily With Breakfast  sodium chloride, 10 mL, Intravenous, Q12H      Assessment:  1. Acute diastolic congestive heart failure presenting with hypoxemic respiratory failure.  Normal EF by echocardiogram. Negative bubble study for intracardiac  shunt.  2. The degree of hypoxemia is out of proportion to fluid overload, pulmonary service following.  3. Hypertension which has been previously labile, currently controlled.  4. Obstructive sleep apnea.  5. Moderate obesity.  6. Abnormal liver function tests with elevated bilirubin.     Plan:  1. Continue current management including current dose of aspirin, Coreg and diuretics.  2. Appears euvolemic, closely monitor renal function and volume status. Hospitalist managing medical conditions and respiratory failure.      Electronically signed by MIREILLE Aiken, 07/21/21, 11:46 AM EDT.    I have seen and examined the patient, case was discussed with the physician extender, reviewed the above note, necessary changes were made and I agree with the final note.   Myra Montano MD, FACC, [facility]

## 2021-07-21 NOTE — PLAN OF CARE
Goal Outcome Evaluation:  Plan of Care Reviewed With: patient        Progress: improving  Outcome Summary: Patient demonstrates significant improvement in activity tolerance this session, increasing ambulation distance to 2 laps around the unit without AD and SBA on 4L O2, with lowest noted O2 sat at 90%, requiring cues for PLB to raise. Will plan to see pt 1-2 more sessions for stair training then plan to transition to nursing-led mobility. Will continue per PT POC.

## 2021-07-21 NOTE — PLAN OF CARE
Goal Outcome Evaluation:  Plan of Care Reviewed With: patient, spouse        Progress: improving  Outcome Summary: Patient had a good day. VSS, weaned down to room air satting at 90-93%. No complaints of pain or shortness of breath. Ambulating well, walked with spouse in bell. Waiting for home bipap to be arranged. Will continue to monitor.

## 2021-07-21 NOTE — PROGRESS NOTES
[facility] Medicine Services  PROGRESS NOTE    Patient Name: Norberto Keita  : 1975  MRN: [id number]    Date of Admission: 2021  Primary Care Physician: Cam Cheung MD    Subjective   Subjective     CC:  Follow-up respiratory failure    HPI:  Patient had telemetry orders however only PCU bed available.  Hospitalist service asked to assume care on behalf of ICU this morning.  Mr. Keita reports that his breathing is much better, he is hopeful to discharge soon.  He is not having any edema or other acute complaints.     I&O's report -26 L with a 9 kg weight increase; I suspect there is an inaccuracy    ROS:  Gen- No fevers, chills  CV- No chest pain, palpitations  Resp- No cough, yes dyspnea  GI- No N/V/D, abd pain    Objective   Objective     Vital Signs:   Temp:  [97.5 °F (36.4 °C)-98 °F (36.7 °C)] 97.5 °F (36.4 °C)  Heart Rate:  [76-93] 81  Resp:  [16-22] 22  BP: (146-157)/(91-98) 157/96  Flow (L/min):  [3.5-4] 4     Physical Exam:  Constitutional: Awake, alert, no acute distress, sitting up in bedside chair preparing to work with PT  HENT: NCAT, mucous membranes moist  Respiratory: Clear to auscultation bilaterally, respiratory effort normal   Cardiovascular: RRR, no murmurs, rubs, or gallops, palpable radial pulses  Gastrointestinal: Positive bowel sounds, soft, nontender, nondistended  Musculoskeletal: No bilateral ankle edema  Psychiatric: Appropriate affect, cooperative  Neurologic: Beach clear, moving all extremity symmetrically    Results Reviewed:  LAB RESULTS:      Lab 21  0624 21  0450 21  0419 21  0522 21  0119 21  0402 21  0402 07/15/21  0603 07/15/21  0602 07/15/21  0602   WBC 10.23 8.21 8.46 10.66 13.85*   < > 11.71*  --    < > 9.56   HEMOGLOBIN 17.9* 17.5 17.0 17.2 17.6   < > 17.3  --    < > 17.2   HEMATOCRIT 60.3* 57.9* 58.2* 57.7* 58.6*   < > 58.9*  --    < > 56.9*   PLATELETS 261 273 255 263 273   < > 250   --    < > 205   NEUTROS ABS 8.39* 7.20* 7.23* 9.25* 12.54*   < > 10.42*  --    < > 7.12*   IMMATURE GRANS (ABS) 0.06* 0.05 0.06* 0.06* 0.09*   < > 0.07*  --    < > 0.06*   LYMPHS ABS 0.96 0.56* 0.66* 0.67* 0.57*   < > 0.45*  --    < > 1.21   MONOS ABS 0.81 0.39 0.50 0.67 0.63   < > 0.75  --    < > 1.03*   EOS ABS 0.00 0.00 0.00 0.00 0.00   < > 0.00  --    < > 0.09   MCV 89.6 86.7 89.4 89.9 87.9   < > 88.4  --    < > 86.9   SED RATE  --  29* 27* 39* 54*  --  68*  --    < > 47*   CRP 0.36 0.60* 0.99* 1.89* 6.53*   < > 13.84* 10.33*   < >  --    PROCALCITONIN  --  <0.02  --  0.05 0.08  --  0.11 0.11  --   --    LACTATE  --   --   --   --   --   --   --   --   --  1.0    < > = values in this interval not displayed.         Lab 07/21/21  0624 07/20/21  0450 07/19/21  0419 07/18/21  0522 07/17/21  0119   SODIUM 136 135* 135* 136 138   POTASSIUM 4.3 4.4 4.5 4.3 4.4   CHLORIDE 99 95* 96* 96* 95*   CO2 25.0 28.0 24.0 29.0 29.0   ANION GAP 12.0 12.0 15.0 11.0 14.0   BUN 21* 21* 21* 24* 22*   CREATININE 0.78 0.82 0.80 0.77 0.92   GLUCOSE 165* 189* 161* 200* 174*   CALCIUM 9.3 9.5 9.3 9.3 9.5   MAGNESIUM 2.2 2.4 2.2 2.1 2.1   PHOSPHORUS 4.5 4.4 4.4 4.5 4.2         Lab 07/21/21  0624 07/20/21  0450 07/19/21  0419 07/18/21  0522 07/17/21  0119   TOTAL PROTEIN 6.7 7.0 6.7 7.1 7.7   ALBUMIN 3.80 3.90 3.60 3.60 4.00   GLOBULIN 2.9 3.1 3.1 3.5 3.7   ALT (SGPT) 47* 56* 64* 77* 83*   AST (SGOT) 17 22 22 34 54*   BILIRUBIN 0.8 1.0 0.9 0.9 1.2   ALK PHOS 86 92 91 102 117         Lab 07/19/21  0419 07/16/21  0402   PROBNP 72.0 55.8                 Brief Urine Lab Results     None          Microbiology Results Abnormal     Procedure Component Value - Date/Time    Blood Culture - Blood, Hand, Left [[id number]] Collected: 07/12/21 1755    Lab Status: Final result Specimen: Blood from Hand, Left Updated: 07/17/21 1831     Blood Culture No growth at 5 days    Blood Culture - Blood, Arm, Left [[id number]] Collected: 07/12/21 1750    Lab Status:  Final result Specimen: Blood from Arm, Left Updated: 07/17/21 1831     Blood Culture No growth at 5 days    Legionella Antigen, Urine - Urine, Urine, Clean Catch [[id number]]  (Normal) Collected: 07/16/21 1041    Lab Status: Final result Specimen: Urine, Clean Catch Updated: 07/17/21 0708     LEGIONELLA ANTIGEN, URINE Negative    S. Pneumo Ag Urine or CSF - Urine, Urine, Clean Catch [[id number]]  (Normal) Collected: 07/16/21 1041    Lab Status: Final result Specimen: Urine, Clean Catch Updated: 07/17/21 0708     Strep Pneumo Ag Negative    COVID PRE-OP / PRE-PROCEDURE SCREENING ORDER (NO ISOLATION) - Swab, Nasopharynx [[id number]]  (Normal) Collected: 07/16/21 1344    Lab Status: Final result Specimen: Swab from Nasopharynx Updated: 07/16/21 1547    Narrative:      The following orders were created for panel order COVID PRE-OP / PRE-PROCEDURE SCREENING ORDER (NO ISOLATION) - Swab, Nasopharynx.  Procedure                               Abnormality         Status                     ---------                               -----------         ------                     Respiratory Panel PCR w/...[[id number]]  Normal              Final result                 Please view results for these tests on the individual orders.    Respiratory Panel PCR w/COVID-19(SARS-CoV-2) RAVEN/CHEVY/FELICIANO/PAD/COR/MAD/ZAHRAA In-House, NP Swab in UTM/VTM, 3-4 HR TAT - Swab, Nasopharynx [[id number]]  (Normal) Collected: 07/16/21 1344    Lab Status: Final result Specimen: Swab from Nasopharynx Updated: 07/16/21 1547     ADENOVIRUS, PCR Not Detected     Coronavirus 229E Not Detected     Coronavirus HKU1 Not Detected     Coronavirus NL63 Not Detected     Coronavirus OC43 Not Detected     COVID19 Not Detected     Human Metapneumovirus Not Detected     Human Rhinovirus/Enterovirus Not Detected     Influenza A PCR Not Detected     Influenza B PCR Not Detected     Parainfluenza Virus 1 Not Detected     Parainfluenza Virus 2 Not Detected     Parainfluenza Virus 3 Not  Detected     Parainfluenza Virus 4 Not Detected     RSV, PCR Not Detected     Bordetella pertussis pcr Not Detected     Bordetella parapertussis PCR Not Detected     Chlamydophila pneumoniae PCR Not Detected     Mycoplasma pneumo by PCR Not Detected    Narrative:      In the setting of a positive respiratory panel with a viral infection PLUS a negative procalcitonin without other underlying concern for bacterial infection, consider observing off antibiotics or discontinuation of antibiotics and continue supportive care. If the respiratory panel is positive for atypical bacterial infection (Bordetella pertussis, Chlamydophila pneumoniae, or Mycoplasma pneumoniae), consider antibiotic de-escalation to target atypical bacterial infection.    Respiratory Culture - Sputum, Cough [[id number]] Collected: 07/13/21 1638    Lab Status: Final result Specimen: Sputum from Cough Updated: 07/15/21 1205     Respiratory Culture Light growth (2+) Normal Respiratory Chantel: NO S.aureus/MRSA or Pseudomonas aeruginosa     Gram Stain Moderate (3+) WBCs per low power field      Less than 20 Epithelial cells per low power field      Few (2+) Mixed bacterial morphotypes seen on Gram Stain    MRSA Screen, PCR (Inpatient) - Swab, Nares [[id number]]  (Normal) Collected: 07/13/21 1135    Lab Status: Final result Specimen: Swab from Nares Updated: 07/13/21 1318     MRSA PCR Negative    Narrative:      MRSA Negative    COVID PRE-OP / PRE-PROCEDURE SCREENING ORDER (NO ISOLATION) - Swab, Nasopharynx [[id number]]  (Normal) Collected: 07/12/21 1631    Lab Status: Final result Specimen: Swab from Nasopharynx Updated: 07/12/21 2809    Narrative:      The following orders were created for panel order COVID PRE-OP / PRE-PROCEDURE SCREENING ORDER (NO ISOLATION) - Swab, Nasopharynx.  Procedure                               Abnormality         Status                     ---------                               -----------         ------                      COVID-19,CEPHEID,CHEVY IN-...[[id number]]  Normal              Final result                 Please view results for these tests on the individual orders.    COVID-19,CEPHEID,CHEVY IN-HOUSE(OR EMERGENT/ADD-ON),NP SWAB IN TRANSPORT MEDIA 3-4 HR TAT - Swab, Nasopharynx [[id number]]  (Normal) Collected: 07/12/21 1631    Lab Status: Final result Specimen: Swab from Nasopharynx Updated: 07/12/21 1734     COVID19 Not Detected    Narrative:      Fact sheet for providers: https://www.fda.gov/media/711219/download     Fact sheet for patients: https://www.fda.gov/media/737234/download  Fact sheet for providers: https://www.fda.gov/media/371024/download     Fact sheet for patients: https://www.fda.gov/media/912441/download          No radiology results from the last 24 hrs    Results for orders placed during the hospital encounter of 07/12/21    Adult Transthoracic Echo Limited W/ Cont if Necessary Per Protocol    Interpretation Summary  · Limited echocardiogram.  · Saline bubble study is negative for intracardiac shunt.      I have reviewed the medications:  Scheduled Meds:allopurinol, 100 mg, Oral, Daily  aspirin, 81 mg, Oral, Daily  bumetanide, 1 mg, Oral, Daily  carvedilol, 12.5 mg, Oral, Q12H  colchicine, 0.6 mg, Oral, BID  doxycycline, 100 mg, Oral, Q12H  enoxaparin, 30 mg, Subcutaneous, Q12H  fluticasone, 2 spray, Each Nare, BID  insulin detemir, 10 Units, Subcutaneous, Daily  insulin lispro, 0-7 Units, Subcutaneous, 4x Daily With Meals & Nightly  ipratropium-albuterol, 3 mL, Nebulization, Q4H - RT  lactobacillus acidophilus, 1 capsule, Oral, Daily  losartan, 100 mg, Oral, Q24H  methylPREDNISolone sodium succinate, 40 mg, Intravenous, Q8H  pantoprazole, 40 mg, Oral, Q AM  pharmacy consult - MTM, , Does not apply, Daily  piperacillin-tazobactam, 4.5 g, Intravenous, Q8H  sodium chloride, 10 mL, Intravenous, Q12H      Continuous Infusions:   PRN Meds:.•  acetaminophen  •  calcium gluconate IVPB **AND** calcium gluconate IVPB  **AND** Calcium, Ionized  •  dextrose  •  dextrose  •  glucagon (human recombinant)  •  HYDROcodone-acetaminophen  •  ipratropium-albuterol  •  magnesium sulfate **OR** magnesium sulfate **OR** magnesium sulfate  •  melatonin  •  ondansetron **OR** ondansetron  •  potassium chloride  •  potassium phosphate infusion greater than 15 mMoles **OR** potassium phosphate infusion greater than 15 mMoles **OR** potassium phosphate **OR** sodium phosphate IVPB **OR** sodium phosphate IVPB **OR** sodium phosphate IVPB  •  senna-docusate sodium  •  sodium chloride  •  sodium chloride  •  temazepam    Assessment/Plan   Assessment & Plan     Active Hospital Problems    Diagnosis  POA   • **Acute on chronic respiratory failure with hypoxia and hypercapnia (CMS/HCC) [J96.21, J96.22]  Yes   • Acute on chronic diastolic congestive heart failure (CMS/[facility]) [I50.33]  Yes   • Mixed hyperlipidemia [E78.2]  Yes   • Prediabetes [R73.03]  Yes   • Acute pulmonary edema (CMS/[facility]) [J81.0]  Yes   • Hypertensive emergency [I16.1]  Yes   • Obstructive sleep apnea [G47.33]  Yes   • Obesity hypoventilation syndrome (CMS/[facility]) -suspected [E66.2]  Yes   • Class 3 severe obesity due to excess calories with serious comorbidity and body mass index (BMI) of 40.0 to 44.9 in adult (CMS/[facility]) [E66.01, Z68.41]  Not Applicable   • Hypertension, essential [I10]  Yes      Resolved Hospital Problems   No resolved problems to display.        Brief Hospital Course to date:  Norberto Keita is a 45 y.o. male with hypertension, obesity, gout, undergoing outpatient evaluation for LEILANI who has been followed outpatient for chronic cough with recent change from lisinopril to losartan, started on HCTZ with concurrent decrease in amlodipine for lower extremity swelling who had run out of his diuretic for approximately 1 week before developing elevated systolic blood pressure and acute shortness of breath admitted to the ICU where he has been treated with a little bit of  "everything including diuretics, steroids, antibiotics, undergoing extensive work-up including autoimmune/rheumatologic/cardiac which is returned largely unremarkable except for evidence of pulmonary edema and modestly elevated proBNP now improving and with orders to stepdown to telemetry on 7/20/2021    The following problems new to me today    Assessment/plan    Acute hypoxic/hypercapnic respiratory failure  Acute exacerbation of diastolic CHF  -S/p BiPAP therapy  -CTA without PE  -EF 51-55% with moderate-severe concentric hypertrophy (FHx HOCM)  -S/p x5 days doxycycline, discontinue further doses  -Completes x10 days high-dose Zosyn tomorrow, would not hold up discharge for a late p.m. dose  -Has been on high-dose steroids for \"unusually high\" inflammatory markers with only a slightly abnormal atypical ANCA, otherwise autoimmune/inflammatory work-up unremarkable; de-escalate high-dose IV Solu-Medrol to prednisone for tomorrow a.m., may need short taper at discharge due to high doses in the ICU for close to a week  -Reported -26 L fluid status with a 9 kg weight gain  -Cardiology recommending continue oral diuretics, Coreg, aspirin  -O2 support as required, wean as tolerated  -Empiric PPI for documented concern for GERD with possible aspiration    Hypertensive crisis  -BP still not optimally controlled; de-escalating steroids  -Continue Bumex, carvedilol, losartan  -Cardiology following    Gout flare  -Continue allopurinol, colchicine started in the ED    Morbid obesity, BMI 49.46 kg/M2  Severe LEILANI  Prediabetes, A1c 6.1%  -Seen via telehealth by Dr. Regan Hardin for LEILANI, reports previous sleep study with severe LEILANI but did not initiate therapy, with subsequent weight gain; was not controlled with CPAP while hospitalized and required bilevel  -Continue NIPPV hs while hospitalized, pulmonology requesting Trilogy at discharge  -Needs lifestyle modification and weight loss    *Per last pulmonology note they stated he " would require Trilogy NIPPV on discharge and placed orders to that effect, this would imply that the patient requires volume ventilation and that all other alternative therapies, including bilevel, have been considered and ruled out due to the severity of the disease state, weak breathing muscles, and potential life-threatening condition including CO2 retention and probability of acute exacerbation.  That the patient requires ventilation to be used during the day as needed in addition to every night with facemask.      DVT prophylaxis:  Medical DVT prophylaxis orders are present.       Disposition: Patient is doing well today and hopeful for discharge in 1-2 days, need to stop IV steroids and ensure no decline in pulmonary function after the same, additionally needs finalized NIPPV set up for home    CODE STATUS:   Code Status and Medical Interventions:   Ordered at: 07/12/21 6017     Level Of Support Discussed With:    Patient     Code Status:    CPR     Medical Interventions (Level of Support Prior to Arrest):    Full       Jd Valente, DO  07/21/21

## 2021-07-22 ENCOUNTER — READMISSION MANAGEMENT (OUTPATIENT)
Dept: CALL CENTER | Facility: HOSPITAL | Age: 46
End: 2021-07-22

## 2021-07-22 ENCOUNTER — TELEPHONE (OUTPATIENT)
Dept: FAMILY MEDICINE CLINIC | Facility: CLINIC | Age: 46
End: 2021-07-22

## 2021-07-22 VITALS
SYSTOLIC BLOOD PRESSURE: 125 MMHG | DIASTOLIC BLOOD PRESSURE: 92 MMHG | HEIGHT: 73 IN | RESPIRATION RATE: 20 BRPM | HEART RATE: 84 BPM | BODY MASS INDEX: 41.75 KG/M2 | TEMPERATURE: 97.8 F | WEIGHT: 315 LBS | OXYGEN SATURATION: 93 %

## 2021-07-22 PROBLEM — J96.01 ACUTE RESPIRATORY FAILURE WITH HYPOXIA: Status: RESOLVED | Noted: 2021-07-12 | Resolved: 2021-07-22

## 2021-07-22 PROBLEM — J81.0 ACUTE PULMONARY EDEMA: Status: RESOLVED | Noted: 2021-07-12 | Resolved: 2021-07-22

## 2021-07-22 PROBLEM — I16.1 HYPERTENSIVE EMERGENCY: Status: RESOLVED | Noted: 2021-07-12 | Resolved: 2021-07-22

## 2021-07-22 LAB
ANION GAP SERPL CALCULATED.3IONS-SCNC: 14 MMOL/L (ref 5–15)
B MICROTI IGG TITR SER: NORMAL {TITER}
B MICROTI IGM TITR SER: NORMAL {TITER}
BUN SERPL-MCNC: 24 MG/DL (ref 6–20)
BUN/CREAT SERPL: 27.3 (ref 7–25)
CALCIUM SPEC-SCNC: 9 MG/DL (ref 8.6–10.5)
CHLORIDE SERPL-SCNC: 96 MMOL/L (ref 98–107)
CO2 SERPL-SCNC: 28 MMOL/L (ref 22–29)
CREAT SERPL-MCNC: 0.88 MG/DL (ref 0.76–1.27)
DEPRECATED RDW RBC AUTO: 47.6 FL (ref 37–54)
ERYTHROCYTE [DISTWIDTH] IN BLOOD BY AUTOMATED COUNT: 16.5 % (ref 12.3–15.4)
GFR SERPL CREATININE-BSD FRML MDRD: 94 ML/MIN/1.73
GLUCOSE BLDC GLUCOMTR-MCNC: 103 MG/DL (ref 70–130)
GLUCOSE BLDC GLUCOMTR-MCNC: 113 MG/DL (ref 70–130)
GLUCOSE SERPL-MCNC: 126 MG/DL (ref 65–99)
HCT VFR BLD AUTO: 59.3 % (ref 37.5–51)
HGB BLD-MCNC: 17.6 G/DL (ref 13–17.7)
MCH RBC QN AUTO: 26.2 PG (ref 26.6–33)
MCHC RBC AUTO-ENTMCNC: 29.7 G/DL (ref 31.5–35.7)
MCV RBC AUTO: 88.2 FL (ref 79–97)
PLATELET # BLD AUTO: 254 10*3/MM3 (ref 140–450)
PMV BLD AUTO: 10.4 FL (ref 6–12)
POTASSIUM SERPL-SCNC: 3.7 MMOL/L (ref 3.5–5.2)
RBC # BLD AUTO: 6.72 10*6/MM3 (ref 4.14–5.8)
SODIUM SERPL-SCNC: 138 MMOL/L (ref 136–145)
WBC # BLD AUTO: 8.68 10*3/MM3 (ref 3.4–10.8)

## 2021-07-22 PROCEDURE — 99239 HOSP IP/OBS DSCHRG MGMT >30: CPT | Performed by: FAMILY MEDICINE

## 2021-07-22 PROCEDURE — 94660 CPAP INITIATION&MGMT: CPT

## 2021-07-22 PROCEDURE — 85027 COMPLETE CBC AUTOMATED: CPT | Performed by: INTERNAL MEDICINE

## 2021-07-22 PROCEDURE — 82962 GLUCOSE BLOOD TEST: CPT

## 2021-07-22 PROCEDURE — 80048 BASIC METABOLIC PNL TOTAL CA: CPT | Performed by: INTERNAL MEDICINE

## 2021-07-22 PROCEDURE — 63710000001 INSULIN DETEMIR PER 5 UNITS: Performed by: INTERNAL MEDICINE

## 2021-07-22 PROCEDURE — 94799 UNLISTED PULMONARY SVC/PX: CPT

## 2021-07-22 PROCEDURE — 25010000002 PIPERACILLIN SOD-TAZOBACTAM PER 1 G: Performed by: INTERNAL MEDICINE

## 2021-07-22 PROCEDURE — 63710000001 PREDNISONE PER 1 MG: Performed by: INTERNAL MEDICINE

## 2021-07-22 PROCEDURE — 25010000002 ENOXAPARIN PER 10 MG: Performed by: INTERNAL MEDICINE

## 2021-07-22 RX ORDER — ASPIRIN 81 MG/1
81 TABLET, CHEWABLE ORAL DAILY
Qty: 30 TABLET | Refills: 0 | Status: SHIPPED | OUTPATIENT
Start: 2021-07-23 | End: 2021-12-07

## 2021-07-22 RX ORDER — BUMETANIDE 1 MG/1
1 TABLET ORAL DAILY
Qty: 30 TABLET | Refills: 0 | Status: SHIPPED | OUTPATIENT
Start: 2021-07-23 | End: 2021-07-28 | Stop reason: SDUPTHER

## 2021-07-22 RX ORDER — CARVEDILOL 12.5 MG/1
12.5 TABLET ORAL EVERY 12 HOURS SCHEDULED
Qty: 60 TABLET | Refills: 0 | Status: SHIPPED | OUTPATIENT
Start: 2021-07-22 | End: 2021-07-28 | Stop reason: SDUPTHER

## 2021-07-22 RX ORDER — PANTOPRAZOLE SODIUM 40 MG/1
40 TABLET, DELAYED RELEASE ORAL DAILY
Qty: 30 TABLET | Refills: 0 | Status: SHIPPED | OUTPATIENT
Start: 2021-07-22 | End: 2021-07-28 | Stop reason: SDUPTHER

## 2021-07-22 RX ORDER — PREDNISONE 20 MG/1
TABLET ORAL
Qty: 15 TABLET | Refills: 0 | Status: SHIPPED | OUTPATIENT
Start: 2021-07-22 | End: 2021-10-28

## 2021-07-22 RX ORDER — ALLOPURINOL 100 MG/1
100 TABLET ORAL DAILY
Qty: 30 TABLET | Refills: 0 | Status: SHIPPED | OUTPATIENT
Start: 2021-07-23 | End: 2021-07-28 | Stop reason: SDUPTHER

## 2021-07-22 RX ADMIN — COLCHICINE 0.6 MG: 0.6 TABLET, FILM COATED ORAL at 09:14

## 2021-07-22 RX ADMIN — TAZOBACTAM SODIUM AND PIPERACILLIN SODIUM 4.5 G: 500; 4 INJECTION, SOLUTION INTRAVENOUS at 05:08

## 2021-07-22 RX ADMIN — PANTOPRAZOLE SODIUM 40 MG: 40 TABLET, DELAYED RELEASE ORAL at 05:08

## 2021-07-22 RX ADMIN — ALLOPURINOL 100 MG: 100 TABLET ORAL at 09:14

## 2021-07-22 RX ADMIN — ASPIRIN 81 MG CHEWABLE TABLET 81 MG: 81 TABLET CHEWABLE at 09:14

## 2021-07-22 RX ADMIN — LOSARTAN POTASSIUM 100 MG: 50 TABLET, FILM COATED ORAL at 09:13

## 2021-07-22 RX ADMIN — BUMETANIDE 1 MG: 1 TABLET ORAL at 09:14

## 2021-07-22 RX ADMIN — PREDNISONE 40 MG: 20 TABLET ORAL at 09:14

## 2021-07-22 RX ADMIN — INSULIN DETEMIR 10 UNITS: 100 INJECTION, SOLUTION SUBCUTANEOUS at 09:15

## 2021-07-22 RX ADMIN — Medication 1 CAPSULE: at 09:14

## 2021-07-22 RX ADMIN — CARVEDILOL 12.5 MG: 12.5 TABLET, FILM COATED ORAL at 09:14

## 2021-07-22 RX ADMIN — ENOXAPARIN SODIUM 30 MG: 30 INJECTION SUBCUTANEOUS at 09:13

## 2021-07-22 NOTE — PLAN OF CARE
Goal Outcome Evaluation:           Progress: improving  Outcome Summary: VSS this shift. On room air while awake and pt tolerating well. Pt wore BIPAP while asleep. Pt A/Ox4. c/o gout pain addressed with PRN medications with relief. Potential discharge today. No acute events this shift, continue POC.

## 2021-07-22 NOTE — PROGRESS NOTES
Pulmonary update:    The chart has been reviewed.  The patient is currently on room air.  The inflammatory work-up has been essentially negative with the exception of an atypical p-ANCA which unfortunately is nonspecific.  He did seem to respond well to steroids although it is unclear to me if this was just coincident with better volume management.  Nonetheless, upon discharge, I would recommend that he be tapered over 2 weeks with prednisone down to nothing.  He was started on 40 mg of p.o. prednisone today down from IV Solu-Medrol.  Please give me a call if you have any questions or concerns.      I would like him to follow-up with us in the next several weeks to a month in the pulmonary clinic and at that time we can perform a chest x-ray to make sure that his lungs cleared up.  He may also require repeat CT scan of the chest to make sure that his infiltrates have resolved completely that we can make this decision at follow-up.    Jared Beasley MD

## 2021-07-22 NOTE — CASE MANAGEMENT/SOCIAL WORK
Case Management Discharge Note      Final Note: Received a call from Daysi with [employer] who advises that patient PA was approved however, patient will have a copay of $400.  They have a program where that patient can get the copay covered and she reports that she will bring the packet to patient today so he can fill it out and return it.  They will set paitent up tonight with his Trilogy.  Spoke with patient at bedside regarding discharge plan who indicates he has already spoken with the [employer] rep and verbalizes understanding and agreement with plan.  MD notified who will discharge patient today.  Patient plan is to discharge home today via car with family to transport.         Selected Continued Care - Admitted Since 7/12/2021     Destination    No services have been selected for the patient.              Durable Medical Equipment Coordination complete.    Service Provider Selected Services Address Phone Fax Patient Preferred    [facility] - [place]  Durable Medical Equipment [address] [facility] [address] [address] [phone] --          Dialysis/Infusion    No services have been selected for the patient.              Home Medical Care    No services have been selected for the patient.              Therapy    No services have been selected for the patient.              Community Resources    No services have been selected for the patient.              Community & DME    No services have been selected for the patient.                       Final Discharge Disposition Code: 01 - home or self-care

## 2021-07-22 NOTE — OUTREACH NOTE
Prep Survey      Responses   [facility] facility patient discharged from?  [place]   Is LACE score < 7 ?  No   Emergency Room discharge w/ pulse ox?  No   Eligibility  [facility]   Date of Admission  07/12/21   Date of Discharge  07/22/21   Discharge Disposition  Home or Self Care   Discharge diagnosis  Acute respiratory failure with hypoxia Acute pulmonary edema    Does the patient have one of the following disease processes/diagnoses(primary or secondary)?  CHF   Does the patient have Home health ordered?  No   Is there a DME ordered?  Yes   What DME was ordered?  [employer]. [employer]   Prep survey completed?  Yes          Josephine Cardona RN

## 2021-07-22 NOTE — PROGRESS NOTES
"[facility] Cardiology   IP Progress Note   LOS: 10 days   Patient Care Team:  Cam Cheung MD as PCP - General    Chief Complaint: Follow up for Diastolic CHF    Subjective {.subhosp2:26139}           Active Hospital Problems    Diagnosis    • **Acute respiratory failure with hypoxia (CMS/HCC)    • Acute on chronic diastolic congestive heart failure (CMS/HCC)      · Echo 7-12-21: Left ventricular wall thickness is consistent with moderate to severe concentric hypertrophy. Left ventricular ejection fraction appears to be 51 - 55%.     • Hypertensive emergency    • Mixed hyperlipidemia    • Hypertension, essential    • Prediabetes    • Class 3 severe obesity due to excess calories with serious comorbidity and body mass index (BMI) of 40.0 to 44.9 in adult (CMS/HCC)    • Acute pulmonary edema (CMS/HCC)    • Obstructive sleep apnea    • Obesity hypoventilation syndrome (CMS/HCC) -suspected              Tele: {.tele:90292}    Vitals:  /85 (BP Location: Right arm, Patient Position: Sitting)   Pulse 84   Temp 97.8 °F (36.6 °C) (Oral)   Resp 20   Ht 185.4 cm (72.99\")   Wt (!) 170 kg (375 lb)   SpO2 91%   BMI 49.49 kg/m²    Body mass index is 49.49 kg/m².    Intake/Output Summary (Last 24 hours) at 7/22/2021 1157  Last data filed at 7/22/2021 0614  Gross per 24 hour   Intake 875 ml   Output 2275 ml   Net -1400 ml       Physical Exam:      General: {CARDIO GEN APPEARANCE, BRIEF:90181}   Chest: {Mis exam chest:76607}   CV: {CARDIAC EXAM:85906}   Extremities: {Exam; extremity:81599}    Results Review:         Labs:  Results from last 7 days   Lab Units 07/22/21  0552   WBC 10*3/mm3 8.68   HEMOGLOBIN g/dL 17.6   HEMATOCRIT % 59.3*   PLATELETS 10*3/mm3 254     Results from last 7 days   Lab Units 07/22/21  0731 07/21/21  0624 07/21/21  0624 07/20/21  0450 07/20/21  0450 07/19/21  0419 07/19/21  0419   SODIUM mmol/L 138   < > 136   < > 135*   < > 135*   POTASSIUM mmol/L 3.7   < > 4.3   < > 4.4   < > " 4.5   CHLORIDE mmol/L 96*   < > 99   < > 95*   < > 96*   CO2 mmol/L 28.0   < > 25.0   < > 28.0   < > 24.0   BUN mg/dL 24*   < > 21*   < > 21*   < > 21*   CREATININE mg/dL 0.88   < > 0.78   < > 0.82   < > 0.80   GLUCOSE mg/dL 126*   < > 165*   < > 189*   < > 161*   CALCIUM mg/dL 9.0   < > 9.3   < > 9.5   < > 9.3   ALT (SGPT) U/L  --   --  47*   < > 56*   < > 64*   AST (SGOT) U/L  --   --  17  --  22  --  22    < > = values in this interval not displayed.     Estimated Creatinine Clearance: 175.8 mL/min (by C-G formula based on SCr of 0.88 mg/dL).  Lab Results   Component Value Date    HGBA1C 6.1 06/02/2021         Results from last 7 days   Lab Units 07/19/21  0419 07/16/21  0402   PROBNP pg/mL 72.0 55.8     Lab Results   Component Value Date    CHLPL 130 06/02/2021    TRIG 114 06/02/2021    HDL 36 (L) 06/02/2021    LDL 73 06/02/2021             COVID19   Date Value Ref Range Status   07/16/2021 Not Detected Not Detected - Ref. Range Final         Scheduled Meds:  allopurinol, 100 mg, Oral, Daily  aspirin, 81 mg, Oral, Daily  bumetanide, 1 mg, Oral, Daily  carvedilol, 12.5 mg, Oral, Q12H  colchicine, 0.6 mg, Oral, BID  enoxaparin, 30 mg, Subcutaneous, Q12H  fluticasone, 2 spray, Each Nare, BID  insulin detemir, 10 Units, Subcutaneous, Daily  insulin lispro, 0-7 Units, Subcutaneous, 4x Daily With Meals & Nightly  ipratropium-albuterol, 3 mL, Nebulization, 4x Daily - RT  lactobacillus acidophilus, 1 capsule, Oral, Daily  losartan, 100 mg, Oral, Q24H  pantoprazole, 40 mg, Oral, Q AM  pharmacy consult - MTM, , Does not apply, Daily  piperacillin-tazobactam, 4.5 g, Intravenous, Q8H  predniSONE, 40 mg, Oral, Daily With Breakfast  sodium chloride, 10 mL, Intravenous, Q12H      Assessment:  1. Acute diastolic congestive heart failure presenting with hypoxemic respiratory failure.  Normal EF by echocardiogram. Negative bubble study for intracardiac shunt.  2. The degree of hypoxemia is out of proportion to fluid overload,  pulmonary service following.  3. Hypertension which has been previously labile, currently controlled.  4. Obstructive sleep apnea.  5. Moderate obesity.  6. Abnormal liver function tests with elevated bilirubin.     Plan:  1. Continue current management including current dose of aspirin, Coreg and diuretics.  2. Remains euvolemic, continue toclosely monitor renal function and volume status.   3. Hospitalist managing medical conditions and respiratory failure.        Electronically signed by MIREILLE Aiken, 07/22/21, 11:59 AM EDT.

## 2021-07-22 NOTE — TELEPHONE ENCOUNTER
[facility] CALLED TO SET UP HOSPITAL FOLLOW UP APPOINTMENT NO AVAILABLE APPOINTMENTS WITH DOCTOR TOM WITH IN 7 DAYS SCHEDULED PATIENT WITH GETACHEW CARVALHO ON 07/28/2021

## 2021-07-22 NOTE — PROGRESS NOTES
Clinical Nutrition     Reason for Visit: Follow-up protocol    Patient Name: Norberto Keita  YOB: 1975  MRN: [id number]  Date of Encounter: 07/22/21 08:12 EDT  Admission date: 7/12/2021    Nutrition Assessment   Admission Problem List:  A/C CHF  A/CRF  Acute Pulmonary Edema  Suspected LEILANI  r/o PNA  Gout flare    PMH:   He  has a past medical history of Sacroiliitis (CMS/HCC).Obesity, Gout, HTN, HLP, Pre-Diabetes    PSxH:  He  has a past surgical history that includes Knee surgery (Left); Hydrocele excision / repair; and Vasectomy.    Reported/Observed/Food/Nutrition Related History:     Good PO Intake     Anthropometrics   Height: 73in  Weight: 367lb standing scale  BMI: 48  BMI classification: Obese Class III extreme obesity: > or equal to 40kg/m2     Labs reviewed     Yes     Current Nutrition Prescription     PO: Diet Regular; Cardiac    Intake: 100% x 8 meals     Nutrition Diagnosis     Problem No nutrition diagnosis at this time   Etiology    Signs/Symptoms      Nutrition Intervention   1.  Follow treatment progress, Care plan reviewed      Goal:   General: Maintain nutrition  PO: Maintain intake    Monitoring/Evaluation:   Per protocol    Aarti Thomas RD  Time Spent: 10min

## 2021-07-23 ENCOUNTER — TRANSITIONAL CARE MANAGEMENT TELEPHONE ENCOUNTER (OUTPATIENT)
Dept: CALL CENTER | Facility: HOSPITAL | Age: 46
End: 2021-07-23

## 2021-07-23 DIAGNOSIS — J96.01 ACUTE RESPIRATORY FAILURE WITH HYPOXIA (HCC): ICD-10-CM

## 2021-07-23 DIAGNOSIS — G47.33 OBSTRUCTIVE SLEEP APNEA: Primary | ICD-10-CM

## 2021-07-23 DIAGNOSIS — M1A.0710 IDIOPATHIC CHRONIC GOUT OF RIGHT FOOT WITHOUT TOPHUS: ICD-10-CM

## 2021-07-23 LAB
H CAPSUL AG SER QL IA: <0.5
Lab: NORMAL

## 2021-07-23 RX ORDER — COLCHICINE 0.6 MG/1
0.6 TABLET ORAL DAILY
Qty: 30 TABLET | Refills: 1 | Status: SHIPPED | OUTPATIENT
Start: 2021-07-23 | End: 2021-07-28 | Stop reason: SDUPTHER

## 2021-07-23 NOTE — TELEPHONE ENCOUNTER
Caller: Norberto Keita    Relationship: Self    Best call back number: [phone]    Medication needed:   Requested Prescriptions     Pending Prescriptions Disp Refills   • colchicine 0.6 MG tablet 30 tablet 1     Sig: Take 1 tablet by mouth Daily.       When do you need the refill by: 07/23/2021    What additional details did the patient provide when requesting the medication: PATIENT STATES THAT HE WAS CHANGED TO TAKEN THIS DAILY IN THE HOSPITAL     PATIENT IS OUT OF MEDICATION     Does the patient have less than a 3 day supply:  [x] Yes  [] No    What is the patient's preferred pharmacy: [facility], [place] - [facility] [address] - [phone]  - [phone] FX

## 2021-07-23 NOTE — PROGRESS NOTES
The patient's DME company says that insurance is requiring a new study.  We will order a PSG.  He was just hospitalized with his respiratory failure.  Regan Hardin MD [facility]  Sleep Medicine  Pulmonary and Critical Care Medicine

## 2021-07-23 NOTE — OUTREACH NOTE
Call Center TCM Note      Responses   [facility] patient discharged from?  [place]   Does the patient have one of the following disease processes/diagnoses(primary or secondary)?  CHF   TCM attempt successful?  Yes   Call start time  1112   Call end time  1113   Discharge diagnosis  Acute respiratory failure with hypoxia Acute pulmonary edema    Does the patient have all medications ordered at discharge?  Yes   Is the patient taking all medications as directed (includes completed medication regime)?  Yes   Does the patient have a primary care provider?   Yes   Does the patient have an appointment with their PCP within 7 days of discharge?  Yes   Comments regarding PCP  f/u with MIREILLE Buck on 7/28   Has the patient kept scheduled appointments due by today?  N/A   What DME was ordered?  [employer]. [employer]   Psychosocial issues?  No   Did the patient receive a copy of their discharge instructions?  Yes   Nursing interventions  Reviewed instructions with patient   What is the patient's perception of their health status since discharge?  Improving   Nursing interventions  Nurse provided patient education   Is the patient able to teach back signs and symptoms of worsening condition? (i.e. weight gain, shortness of air, etc.)  Yes   Is the patient/caregiver able to teach back the hierarchy of who to call/visit for symptoms/problems? PCP, Specialist, Home health nurse, Urgent Care, ED, 911  Yes   TCM call completed?  Yes   Wrap up additional comments  Says he is doing well, no questions or concerns at this time, confirmed hospital f/u appt with PCP office for 7/28.           Dana Austin RN    7/23/2021, 11:13 EDT

## 2021-07-25 LAB
A PHAGOCYTOPH IGG TITR SER IF: NEGATIVE {TITER}
A PHAGOCYTOPH IGM TITR SER IF: NEGATIVE {TITER}
E CHAFFEENSIS IGG TITR SER IF: NEGATIVE {TITER}
E CHAFFEENSIS IGM TITR SER IF: NEGATIVE {TITER}

## 2021-07-25 NOTE — DISCHARGE SUMMARY
[facility] Medicine Services  DISCHARGE SUMMARY    Patient Name: Norberto Keita  : 1975  MRN: [id number]    Date of Admission: 2021  3:37 PM  Date of Discharge:  21  Primary Care Physician: Cam Cheung MD    Consults     Date and Time Order Name Status Description    2021  1:08 PM Inpatient Cardiology Consult Completed           Hospital Course     Presenting Problem:   Acute respiratory failure with hypoxia and hypercarbia (CMS/HCC) [J96.01, J96.02]    Active Hospital Problems    Diagnosis  POA   • Acute on chronic diastolic congestive heart failure (CMS/HCC) [I50.33]  Yes   • Mixed hyperlipidemia [E78.2]  Yes   • Prediabetes [R73.03]  Yes   • Obstructive sleep apnea [G47.33]  Yes   • Obesity hypoventilation syndrome (CMS/HCC) -suspected [E66.2]  Yes   • Class 3 severe obesity due to excess calories with serious comorbidity and body mass index (BMI) of 40.0 to 44.9 in adult (CMS/HCC) [E66.01, Z68.41]  Not Applicable   • Hypertension, essential [I10]  Yes      Resolved Hospital Problems    Diagnosis Date Resolved POA   • **Acute respiratory failure with hypoxia (CMS/HCC) [J96.01] 2021 Yes   • Acute pulmonary edema (CMS/HCC) [J81.0] 2021 Yes   • Hypertensive emergency [I16.1] 2021 Yes          Hospital Course:  Norberto Keita is a 45 y.o. male with hypertension, obesity, gout, undergoing outpatient evaluation for LEILANI who has been followed outpatient for chronic cough with recent change from lisinopril to losartan, started on HCTZ with concurrent decrease in amlodipine for lower extremity swelling who had run out of his diuretic for approximately 1 week before developing elevated systolic blood pressure and acute shortness of breath admitted to the ICU. He was placed on BiPAP and then weaned to nasal cannula.   The following was addressed during this admission    Acute hypoxic/hypercapnic respiratory failure  Acute exacerbation of diastolic  CHF  -S/p BiPAP therapy  -CTA without PE  -EF 51-55% with moderate-severe concentric hypertrophy (FHx HOCM)  -S/p x5 days doxycycline  -Completed x10 days high-dose Zosyn   -Has been on hhigh-dose IV Solu-Medrol and changed  to prednisone. Discharged with two week taper   -Cardiology recommending continue oral diuretics, Coreg, aspirin  -Empiric PPI for documented concern for GERD with possible aspiration  -pt discharged home with trilogy      Hypertensive crisis  -BP still not optimally controlled; de-escalating steroids  -Continue Bumex, carvedilol, losartan  -Cardiology following     Gout flare  -Continue allopurinol, colchicine started in the ED     Morbid obesity, BMI 49.46 kg/M2  Severe LEILANI  Prediabetes, A1c 6.1%  -Seen via telehealth by Dr. Regan Hardin for LEILANI, reports previous sleep study with severe LEILANI but did not initiate therapy, with subsequent weight gain; was not controlled with CPAP while hospitalized and required bilevel  -needs weight loss  - home with trilogy         Discharge Follow Up Recommendations for outpatient labs/diagnostics:   PCP 1-2 weeks   Pulmonology  Cardiology     Day of Discharge     HPI:   Patient states he feels much better. No chest pain or shortness of breath. He is happy to be going home.     Review of Systems  Gen- No fevers, chills  CV- No chest pain, palpitations  Resp- No cough, dyspnea  GI- No N/V/D, abd pain        Vital Signs:         Physical Exam:  Constitutional: No acute distress, awake, alert  HENT: NCAT, mucous membranes moist  Respiratory: Clear to auscultation bilaterally, respiratory effort normal   Cardiovascular: RRR, no murmurs, rubs, or gallops  Gastrointestinal: Positive bowel sounds, soft, nontender, nondistended  Musculoskeletal: No bilateral ankle edema  Psychiatric: Appropriate affect, cooperative  Neurologic: Oriented x 3, strength symmetric in all extremities,speech clear  Skin: No rashes      Pertinent  and/or Most Recent Results     LAB RESULTS:       Lab 07/22/21  0552 07/21/21  1056 07/21/21  0624 07/20/21  0450 07/19/21  0419   WBC 8.68  --  10.23 8.21 8.46   HEMOGLOBIN 17.6  --  17.9* 17.5 17.0   HEMATOCRIT 59.3*  --  60.3* 57.9* 58.2*   PLATELETS 254  --  261 273 255   NEUTROS ABS  --   --  8.39* 7.20* 7.23*   IMMATURE GRANS (ABS)  --   --  0.06* 0.05 0.06*   LYMPHS ABS  --   --  0.96 0.56* 0.66*   MONOS ABS  --   --  0.81 0.39 0.50   EOS ABS  --   --  0.00 0.00 0.00   MCV 88.2  --  89.6 86.7 89.4   SED RATE  --  21*  --  29* 27*   CRP  --   --  0.36 0.60* 0.99*   PROCALCITONIN  --   --   --  <0.02  --          Lab 07/22/21  0731 07/21/21  0624 07/20/21  0450 07/19/21 0419   SODIUM 138 136 135* 135*   POTASSIUM 3.7 4.3 4.4 4.5   CHLORIDE 96* 99 95* 96*   CO2 28.0 25.0 28.0 24.0   ANION GAP 14.0 12.0 12.0 15.0   BUN 24* 21* 21* 21*   CREATININE 0.88 0.78 0.82 0.80   GLUCOSE 126* 165* 189* 161*   CALCIUM 9.0 9.3 9.5 9.3   MAGNESIUM  --  2.2 2.4 2.2   PHOSPHORUS  --  4.5 4.4 4.4         Lab 07/21/21  0624 07/20/21  0450 07/19/21  0419   TOTAL PROTEIN 6.7 7.0 6.7   ALBUMIN 3.80 3.90 3.60   GLOBULIN 2.9 3.1 3.1   ALT (SGPT) 47* 56* 64*   AST (SGOT) 17 22 22   BILIRUBIN 0.8 1.0 0.9   ALK PHOS 86 92 91         Lab 07/19/21  0419   PROBNP 72.0                 Brief Urine Lab Results     None        Microbiology Results (last 10 days)     Procedure Component Value - Date/Time    COVID PRE-OP / PRE-PROCEDURE SCREENING ORDER (NO ISOLATION) - Swab, Nasopharynx [[id number]]  (Normal) Collected: 07/16/21 1344    Lab Status: Final result Specimen: Swab from Nasopharynx Updated: 07/16/21 3482    Narrative:      The following orders were created for panel order COVID PRE-OP / PRE-PROCEDURE SCREENING ORDER (NO ISOLATION) - Swab, Nasopharynx.  Procedure                               Abnormality         Status                     ---------                               -----------         ------                     Respiratory Panel PCR w/...[[id number]]  Normal               Final result                 Please view results for these tests on the individual orders.    Respiratory Panel PCR w/COVID-19(SARS-CoV-2) [facility]/FELICIANO/PAD/[facility]/MAD/ZAHRAA In-House, NP Swab in UTM/VTM, 3-4 HR TAT - Swab, Nasopharynx [[id number]]  (Normal) Collected: 07/16/21 1344    Lab Status: Final result Specimen: Swab from Nasopharynx Updated: 07/16/21 1547     ADENOVIRUS, PCR Not Detected     Coronavirus 229E Not Detected     Coronavirus HKU1 Not Detected     Coronavirus NL63 Not Detected     Coronavirus OC43 Not Detected     COVID19 Not Detected     Human Metapneumovirus Not Detected     Human Rhinovirus/Enterovirus Not Detected     Influenza A PCR Not Detected     Influenza B PCR Not Detected     Parainfluenza Virus 1 Not Detected     Parainfluenza Virus 2 Not Detected     Parainfluenza Virus 3 Not Detected     Parainfluenza Virus 4 Not Detected     RSV, PCR Not Detected     Bordetella pertussis pcr Not Detected     Bordetella parapertussis PCR Not Detected     Chlamydophila pneumoniae PCR Not Detected     Mycoplasma pneumo by PCR Not Detected    Narrative:      In the setting of a positive respiratory panel with a viral infection PLUS a negative procalcitonin without other underlying concern for bacterial infection, consider observing off antibiotics or discontinuation of antibiotics and continue supportive care. If the respiratory panel is positive for atypical bacterial infection (Bordetella pertussis, Chlamydophila pneumoniae, or Mycoplasma pneumoniae), consider antibiotic de-escalation to target atypical bacterial infection.    Fungus Culture, Blood - Blood, Arm, Left [[id number]]  (Normal) Collected: 07/16/21 1100    Lab Status: Preliminary result Specimen: Blood from Arm, Left Updated: 07/23/21 1131     Fungus Culture No fungus isolated at 1 week    Blastomyces Antigen - Urine, Urine, Clean Catch [[id number]] Collected: 07/16/21 1041    Lab Status: Final result Specimen: Urine, Clean Catch Updated: 07/21/21  0915     MVista(R) Blastomyces Ag None Detected ng/mL      Comment: Results reported as ng/mL in 0.2 - 14.7 ng/mL range  Results above the limit of detection but below 0.2 ng/mL  are reported as 'Positive, Below the Limit of  Quantification'  Results above 14.7 ng/mL are reported as 'Positive,  Above the Limit of Quantification'        Specimen Type URINE    Narrative:      Performed at:   - [facility] [employer]  [address] [place] [address] [place]  [id number]  : Félix Yanes MD, Phone:  [phone]    S. Pneumo Ag Urine or CSF - Urine, Urine, Clean Catch [[id number]]  (Normal) Collected: 07/16/21 1041    Lab Status: Final result Specimen: Urine, Clean Catch Updated: 07/17/21 0708     Strep Pneumo Ag Negative    Legionella Antigen, Urine - Urine, Urine, Clean Catch [[id number]]  (Normal) Collected: 07/16/21 1041    Lab Status: Final result Specimen: Urine, Clean Catch Updated: 07/17/21 0708     LEGIONELLA ANTIGEN, URINE Negative          US Liver    Result Date: 7/15/2021  EXAMINATION: US LIVER- 07/14/2021  INDICATION: elevated LFTs; J96.01-Acute respiratory failure with hypoxia; J96.02-Acute respiratory failure with hypercapnia  TECHNIQUE: Ultrasound right upper quadrant abdomen and liver  COMPARISON: NONE  FINDINGS:  Visualized portions of the pancreas within normal limits.  Liver demonstrates increased echogenicity throughout the hepatic parenchyma without focal liver lesion.  Gallbladder is present without cholelithiasis, gallbladder wall thickening or pericholecystic fluid. Potential mild sludge versus artifact.  Common bile duct measures 2 mm in diameter at the level of the nicolle hepatis within normal limits.  Right kidney measures 12.4 cm in length without evidence of hydronephrosis or obvious calculi with a 4.9 cm simple renal cortical cyst with exophytic margination.      Diffusely echogenic liver parenchyma consistent with parenchymal disease process such as hepatic steatosis. No focal liver  lesion or adjacent ascites.  D:  07/15/2021 E:  07/15/2021    This report was finalized on 7/15/2021 6:48 PM by Dr. Casper Vu.      XR Chest 1 View    Result Date: 7/17/2021  EXAMINATION: XR CHEST 1 VW-  INDICATION: f/u; J96.01-Acute respiratory failure with hypoxia; J96.02-Acute respiratory failure with hypercapnia  COMPARISON: One day prior  FINDINGS: Lung volumes remain diminished with basilar atelectasis. No focal opacity otherwise. No significant effusion or distinct pneumothorax. Unchanged degree of cardiac enlargement.      Lung volumes remain diminished with basilar atelectasis. No focal opacity otherwise. No significant effusion or distinct pneumothorax. Unchanged degree of cardiac enlargement.  This report was finalized on 7/17/2021 7:51 AM by Neymar Sandoval.      XR Chest 1 View    Result Date: 7/16/2021  EXAMINATION: XR CHEST 1 VW-  INDICATION: Followup; J96.01-Acute respiratory failure with hypoxia; J96.02-Acute respiratory failure with hypercapnia.  COMPARISON: 07/14/2021.  FINDINGS: Portable chest reveals heart to be borderline enlarged with mild elevation seen of the right hemidiaphragm. Minimal increased markings seen at the lung bases. Degenerative change is seen within the spine. Pulmonary vascularity is stable.         Stable chest as above.  D:  07/16/2021 E:  07/16/2021  This report was finalized on 7/16/2021 5:28 PM by Dr. Lyla Acevedo MD.      Adult Transthoracic Echo Limited W/ Cont if Necessary Per Protocol    Result Date: 7/15/2021  · Limited echocardiogram for bubble study. · This is a suboptimal/inconclusive study, saline bubbles were not visualized on either side of the interatrial septum.      Adult Transthoracic Echo Limited W/ Cont if Necessary Per Protocol    Result Date: 7/15/2021  · Limited echocardiogram. · Saline bubble study is negative for intracardiac shunt.                Results for orders placed during the hospital encounter of 07/12/21    Adult Transthoracic  Echo Limited W/ Cont if Necessary Per Protocol    Interpretation Summary  · Limited echocardiogram.  · Saline bubble study is negative for intracardiac shunt.      Plan for Follow-up of Pending Labs/Results:   Pending Labs     Order Current Status    Fungus Culture, Blood - Blood, Arm, Left Preliminary result        Discharge Details        Discharge Medications      New Medications      Instructions Start Date   allopurinol 100 MG tablet  Commonly known as: ZYLOPRIM   100 mg, Oral, Daily      aspirin 81 MG chewable tablet   81 mg, Oral, Daily      bumetanide 1 MG tablet  Commonly known as: BUMEX   1 mg, Oral, Daily      carvedilol 12.5 MG tablet  Commonly known as: COREG   12.5 mg, Oral, Every 12 Hours Scheduled      pantoprazole 40 MG EC tablet  Commonly known as: PROTONIX   40 mg, Oral, Daily      predniSONE 20 MG tablet  Commonly known as: DELTASONE   Take one by mouth bid x4 days, take on by mouth daily for 4 days, take 1/2 daily for 6 days         Continue These Medications      Instructions Start Date   atorvastatin 40 MG tablet  Commonly known as: Lipitor   40 mg, Oral, Nightly      losartan 100 MG tablet  Commonly known as: Cozaar   100 mg, Oral, Daily      tadalafil 20 MG tablet  Commonly known as: Cialis   1/2-1 PO as needed prior to activity         Stop These Medications    amLODIPine 5 MG tablet  Commonly known as: NORVASC     atenolol 100 MG tablet  Commonly known as: Tenormin     hydroCHLOROthiazide 25 MG tablet  Commonly known as: HYDRODIURIL     indomethacin 50 MG capsule  Commonly known as: INDOCIN     omeprazole 40 MG capsule  Commonly known as: priLOSEC            Allergies   Allergen Reactions   • Lisinopril Cough         Discharge Disposition:  Home or Self Care    Diet:  Hospital:No active diet order      Activity:  Activity Instructions     Gradually Increase Activity Until at Pre-Hospitalization Level            Restrictions or Other Recommendations:         CODE STATUS:    Code Status and  Medical Interventions:   Ordered at: 07/12/21 2218     Level Of Support Discussed With:    Patient     Code Status:    CPR     Medical Interventions (Level of Support Prior to Arrest):    Full       Future Appointments   Date Time Provider Department Center   7/27/2021  9:30 AM Hunter Shannon APRN [facility]   7/28/2021 11:00 AM Naresh Joyner PA [facility]   7/30/2021 10:30 AM Sapna Benton APRN [facility] None   8/30/2021 10:00 AM Neymar Bhakta PA [facility]   9/15/2021  8:30 AM Nikos Hoffmann MD [facility]   9/22/2021  9:45 AM Madison Clay APRN [facility]       Additional Instructions for the Follow-ups that You Need to Schedule     Discharge Follow-up with PCP   As directed       Currently Documented PCP:    Cam Cheung MD    PCP Phone Number:    [phone]     Follow Up Details: 1-2 weeks         Discharge Follow-up with Specified Provider: cardiology; 1 Month   As directed      To: cardiology    Follow Up: 1 Month         Discharge Follow-up with Specified Provider: pulmonology 4-6 weeks; 6 Weeks   As directed      To: pulmonology 4-6 weeks    Follow Up: 6 Weeks                     Patricia Lomax DO  07/25/21      Time Spent on Discharge:  I spent  35 minutes on this discharge activity which included: face-to-face encounter with the patient, reviewing the data in the system, coordination of the care with the nursing staff as well as consultants, documentation, and entering orders.

## 2021-07-26 LAB
BH CV ECHO MEAS - BSA(HAYCOCK): 3 M^2
BH CV ECHO MEAS - BSA: 2.8 M^2
BH CV ECHO MEAS - BZI_BMI: 49.9 KILOGRAMS/M^2
BH CV ECHO MEAS - BZI_METRIC_HEIGHT: 182.9 CM
BH CV ECHO MEAS - BZI_METRIC_WEIGHT: 166.9 KG
BH CV VAS BP LEFT ARM: NORMAL MMHG
MAXIMAL PREDICTED HEART RATE: 176 BPM
STRESS TARGET HR: 150 BPM

## 2021-07-27 ENCOUNTER — OFFICE VISIT (OUTPATIENT)
Dept: CARDIOLOGY | Facility: HOSPITAL | Age: 46
End: 2021-07-27

## 2021-07-27 VITALS
OXYGEN SATURATION: 94 % | WEIGHT: 315 LBS | HEART RATE: 92 BPM | RESPIRATION RATE: 23 BRPM | SYSTOLIC BLOOD PRESSURE: 139 MMHG | DIASTOLIC BLOOD PRESSURE: 91 MMHG | TEMPERATURE: 96.9 F | BODY MASS INDEX: 41.75 KG/M2 | HEIGHT: 73 IN

## 2021-07-27 DIAGNOSIS — I50.33 ACUTE ON CHRONIC DIASTOLIC CONGESTIVE HEART FAILURE (HCC): Primary | ICD-10-CM

## 2021-07-27 DIAGNOSIS — G47.33 OBSTRUCTIVE SLEEP APNEA: ICD-10-CM

## 2021-07-27 DIAGNOSIS — I10 HYPERTENSION, ESSENTIAL: ICD-10-CM

## 2021-07-27 PROCEDURE — 99214 OFFICE O/P EST MOD 30 MIN: CPT | Performed by: NURSE PRACTITIONER

## 2021-07-27 NOTE — PROGRESS NOTES
"Chief Complaint  Congestive Heart Failure and Establish Care    Subjective    History of Present Illness {CC  Problem List  Visit  Diagnosis   Encounters  Notes  Medications  Labs  Result Review Imaging  Media :23}     Norberto Keita, 45 y.o. male with hypertension, obesity, gout, undergoing outpatient evaluation for LEILANI presents to [facility] for Congestive Heart Failure and Establish Care  Patient recently hospitalized at [facility] for complaints of elevated blood pressure and acute shortness of breath.  He was admitted to the ICU for acute hypoxic/hypercapnic respiratory failure/diastolic HF exacerbation, and placed on BiPAP.  Patient had ran out of diuretic for approximately 1 week at home before developing symptoms.  Patient has outpatient cardiology appointment scheduled 8/30/2021, as well as sleep medicine on 9/22/2021; also has PCP appointment tomorrow 7/28/2021.    Weights at home have been decreasing, edema is improved and breathing has also improved since discharge.  He feels much improved since discharge overall.  He is compliant with home Bipap.  He is taking his medications as prescribed.  He denies chest pain, racing heart, and syncope.  He has follow-up with PCP scheduled tomorrow, cardiology follow-up scheduled in approximately 1 month.      Objective     Vital Signs:   Vitals:    07/27/21 0945 07/27/21 0948 07/27/21 0949   BP: 133/91 128/88 139/91   BP Location: Right arm Left arm Left arm   Patient Position: Sitting Standing Sitting   Cuff Size: Large Adult Large Adult Adult   Pulse: 94 96 92   Resp:   23   Temp:   96.9 °F (36.1 °C)   TempSrc:   Temporal   SpO2: 91% 91% 94%   Weight:   (!) 155 kg (342 lb)   Height:   185.4 cm (72.99\")     Body mass index is 45.13 kg/m².  Physical Exam  Vitals and nursing note reviewed.   Constitutional:       Appearance: Normal appearance.   HENT:      Head: Normocephalic.   Eyes:      Extraocular Movements: " Extraocular movements intact.   Neck:      Vascular: No carotid bruit.   Cardiovascular:      Rate and Rhythm: Normal rate and regular rhythm.      Pulses: Normal pulses.      Heart sounds: Normal heart sounds, S1 normal and S2 normal. No murmur heard.     Pulmonary:      Effort: Pulmonary effort is normal. No respiratory distress.      Breath sounds: Normal breath sounds.   Musculoskeletal:      Cervical back: Neck supple.      Right lower leg: No edema.      Left lower leg: No edema.   Skin:     General: Skin is warm and dry.   Neurological:      General: No focal deficit present.      Mental Status: He is alert.   Psychiatric:         Mood and Affect: Mood normal.         Behavior: Behavior normal.         Thought Content: Thought content normal.        Data Reviewed:{ Labs  Result Review  Imaging  Med Tab  Media :23}     Adult Transthoracic Echo Complete W/ Cont if Necessary Per Protocol (07/12/2021 20:19)  Adult Transthoracic Echo Limited W/ Cont if Necessary Per Protocol (07/15/2021 13:11)      Assessment and Plan {CC Problem List  Visit Diagnosis  ROS  Review (Popup)  Health Maintenance  Quality  BestPractice  Medications  SmartSets  SnapShot Encounters  Media :23}     1. Acute on chronic diastolic congestive heart failure (CMS/HCC)  - Dyspnea, edema, weights improved since discharge  - Near euvolemic on exam  - Diuretic regimen: Bumex 1 mg daily  - BP control with losartan, carvedilol  - Will defer BMP today since he has PCP follow-up tomorrow    2. Hypertension, essential  - Controlled at time of visit  - Continue carvedilol, losartan as ordered    3. Obstructive sleep apnea  - Compliant with home Bipap  - Continue Bipap use    Follow Up {Instructions Charge Capture  Follow-up Communications :23}     Return if symptoms worsen or fail to improve.    Patient was given instructions and counseling regarding his condition or for health maintenance advice. Please see specific information pulled  into the AVS if appropriate.  Patient was instructed to call the [facility] with any questions, concerns, or worsening symptoms.    *Please note that portions of this note were completed with a voice recognition program. Efforts were made to edit the dictations, but occasionally words are mistranscribed.

## 2021-07-28 ENCOUNTER — OFFICE VISIT (OUTPATIENT)
Dept: FAMILY MEDICINE CLINIC | Facility: CLINIC | Age: 46
End: 2021-07-28

## 2021-07-28 VITALS
RESPIRATION RATE: 24 BRPM | BODY MASS INDEX: 41.75 KG/M2 | OXYGEN SATURATION: 95 % | WEIGHT: 315 LBS | HEART RATE: 80 BPM | TEMPERATURE: 97.5 F | DIASTOLIC BLOOD PRESSURE: 90 MMHG | SYSTOLIC BLOOD PRESSURE: 130 MMHG | HEIGHT: 73 IN

## 2021-07-28 DIAGNOSIS — E66.01 MORBID (SEVERE) OBESITY DUE TO EXCESS CALORIES (HCC): ICD-10-CM

## 2021-07-28 DIAGNOSIS — E78.00 HYPERCHOLESTEROLEMIA: ICD-10-CM

## 2021-07-28 DIAGNOSIS — I10 HYPERTENSION, ESSENTIAL: ICD-10-CM

## 2021-07-28 DIAGNOSIS — K21.9 GASTROESOPHAGEAL REFLUX DISEASE, UNSPECIFIED WHETHER ESOPHAGITIS PRESENT: ICD-10-CM

## 2021-07-28 DIAGNOSIS — Z09 HOSPITAL DISCHARGE FOLLOW-UP: Primary | ICD-10-CM

## 2021-07-28 DIAGNOSIS — M1A.0710 IDIOPATHIC CHRONIC GOUT OF RIGHT FOOT WITHOUT TOPHUS: ICD-10-CM

## 2021-07-28 DIAGNOSIS — Z51.81 MEDICATION MONITORING ENCOUNTER: ICD-10-CM

## 2021-07-28 DIAGNOSIS — I50.33 ACUTE ON CHRONIC DIASTOLIC CONGESTIVE HEART FAILURE (HCC): ICD-10-CM

## 2021-07-28 PROCEDURE — 99495 TRANSJ CARE MGMT MOD F2F 14D: CPT | Performed by: PHYSICIAN ASSISTANT

## 2021-07-28 RX ORDER — ATORVASTATIN CALCIUM 40 MG/1
40 TABLET, FILM COATED ORAL NIGHTLY
Qty: 90 TABLET | Refills: 0 | Status: SHIPPED | OUTPATIENT
Start: 2021-07-28 | End: 2021-12-13 | Stop reason: SDUPTHER

## 2021-07-28 RX ORDER — PANTOPRAZOLE SODIUM 40 MG/1
40 TABLET, DELAYED RELEASE ORAL DAILY
Qty: 90 TABLET | Refills: 0 | Status: SHIPPED | OUTPATIENT
Start: 2021-07-28 | End: 2021-11-15

## 2021-07-28 RX ORDER — BUMETANIDE 1 MG/1
1 TABLET ORAL DAILY
Qty: 90 TABLET | Refills: 0 | Status: SHIPPED | OUTPATIENT
Start: 2021-07-28 | End: 2021-11-15

## 2021-07-28 RX ORDER — LOSARTAN POTASSIUM 100 MG/1
100 TABLET ORAL DAILY
Qty: 90 TABLET | Refills: 1 | Status: SHIPPED | OUTPATIENT
Start: 2021-07-28 | End: 2022-02-21 | Stop reason: SDUPTHER

## 2021-07-28 RX ORDER — COLCHICINE 0.6 MG/1
0.6 TABLET ORAL DAILY
Qty: 90 TABLET | Refills: 0 | Status: SHIPPED | OUTPATIENT
Start: 2021-07-28 | End: 2021-11-02 | Stop reason: SDUPTHER

## 2021-07-28 RX ORDER — ALLOPURINOL 100 MG/1
100 TABLET ORAL DAILY
Qty: 90 TABLET | Refills: 0 | Status: SHIPPED | OUTPATIENT
Start: 2021-07-28 | End: 2021-11-15

## 2021-07-28 RX ORDER — CARVEDILOL 12.5 MG/1
12.5 TABLET ORAL EVERY 12 HOURS SCHEDULED
Qty: 180 TABLET | Refills: 0 | Status: SHIPPED | OUTPATIENT
Start: 2021-07-28 | End: 2021-11-15

## 2021-07-28 NOTE — PROGRESS NOTES
"Transitional Care Follow Up Visit  Subjective     Norberto Keita is a 45 y.o. male who presents for a transitional care management visit.    Within 48 business hours after discharge our office contacted him via telephone to coordinate his care and needs.      I reviewed and discussed the details of that call along with the discharge summary, hospital problems, inpatient lab results, inpatient diagnostic studies, and consultation reports with Norberto.     Current outpatient and discharge medications have been reconciled for the patient.  Reviewed by: MIREILLE Buck      Date of TCM Phone Call 7/22/2021   [facility]   Date of Admission 7/12/2021   Date of Discharge 7/22/2021   Discharge Disposition Home or Self Care     Risk for Readmission (LACE) No data recorded      History of Present Illness   Course During Hospital Stay:    Per MD discharge summary:    \"Norberto Keita is a 45 y.o. male with hypertension, obesity, gout, undergoing outpatient evaluation for LEILANI who has been followed outpatient for chronic cough with recent change from lisinopril to losartan, started on HCTZ with concurrent decrease in amlodipine for lower extremity swelling who had run out of his diuretic for approximately 1 week before developing elevated systolic blood pressure and acute shortness of breath admitted to the ICU. He was placed on BiPAP and then weaned to nasal cannula.   The following was addressed during this admission     Acute hypoxic/hypercapnic respiratory failure  Acute exacerbation of diastolic CHF  -S/p BiPAP therapy  -CTA without PE  -EF 51-55% with moderate-severe concentric hypertrophy (FHx HOCM)  -S/p x5 days doxycycline  -Completed x10 days high-dose Zosyn   -Has been on hhigh-dose IV Solu-Medrol and changed  to prednisone. Discharged with two week taper   -Cardiology recommending continue oral diuretics, Coreg, aspirin  -Empiric PPI for documented concern for GERD with possible aspiration  -pt " "discharged home with trilogy      Hypertensive crisis  -BP still not optimally controlled; de-escalating steroids  -Continue Bumex, carvedilol, losartan  -Cardiology following     Gout flare  -Continue allopurinol, colchicine started in the ED     Morbid obesity, BMI 49.46 kg/M2  Severe LEILANI  Prediabetes, A1c 6.1%  -Seen via telehealth by Dr. Regan Hardin for LEILANI, reports previous sleep study with severe LEILANI but did not initiate therapy, with subsequent weight gain; was not controlled with CPAP while hospitalized and required bilevel  -needs weight loss  - home with trilogy \"    Since hospitalization, patient has followed up with cardiology   Doing well   Will need refill on medications   On bipap  Energy doing much better   Appetite normal   No cough.   No longer feeling SOB or winded   Down 35 lbs from last visit secondary to hospitalization   Still on steroid. Has borderline diabetes. Will recheck in 3 months   Pt already has appointments with pulmonology and sleep medicine scheduled.   Needs Short term disability paperwork completed   Has physically demanding job and does not want to jump back in yet, would like more time to recover     Pt is concerned about his health and knows that lifestyle modification will be necessary to prevent worsening health concerns     The following portions of the patient's history were reviewed and updated as appropriate: allergies, current medications, past family history, past medical history, past social history, past surgical history and problem list.    Review of Systems   Constitutional: Positive for fatigue. Negative for chills, diaphoresis and fever.   HENT: Negative.  Negative for congestion, ear discharge, ear pain, hearing loss, nosebleeds, postnasal drip, sinus pressure, sneezing and sore throat.    Eyes: Negative.    Respiratory: Negative.  Negative for cough, chest tightness, shortness of breath and wheezing.    Cardiovascular: Positive for leg swelling. Negative for " "chest pain and palpitations.   Gastrointestinal: Negative for abdominal distention, abdominal pain, blood in stool, constipation, diarrhea, nausea and vomiting.   Genitourinary: Negative.  Negative for difficulty urinating, dysuria, flank pain, frequency, hematuria and urgency.   Skin: Negative.  Negative for color change, pallor, rash and wound.   Neurological: Negative for dizziness, syncope, weakness, light-headedness, numbness and headaches.       Objective    Blood pressure 130/90, pulse 80, temperature 97.5 °F (36.4 °C), resp. rate 24, height 185.4 cm (72.99\"), weight (!) 154 kg (340 lb 8 oz), SpO2 95 %.   Body mass index is 44.93 kg/m².     Physical Exam  Vitals and nursing note reviewed.   Constitutional:       Appearance: Normal appearance. He is obese.   HENT:      Head: Normocephalic.      Right Ear: External ear normal.      Left Ear: External ear normal.      Nose: Nose normal.   Eyes:      Conjunctiva/sclera: Conjunctivae normal.   Cardiovascular:      Rate and Rhythm: Normal rate and regular rhythm.      Heart sounds: Normal heart sounds.   Pulmonary:      Effort: Pulmonary effort is normal. No respiratory distress.      Breath sounds: Normal breath sounds. No wheezing or rhonchi.   Skin:     General: Skin is warm and dry.   Neurological:      Mental Status: He is alert and oriented to person, place, and time.   Psychiatric:         Mood and Affect: Mood normal.         Behavior: Behavior normal.         Thought Content: Thought content normal.         Judgment: Judgment normal.         Assessment/Plan   Diagnoses and all orders for this visit:    1. Hospital discharge follow-up (Primary)  -     Basic metabolic panel  -     Magnesium  -     CBC w AUTO Differential    2. Hypercholesterolemia  -     atorvastatin (Lipitor) 40 MG tablet; Take 1 tablet by mouth Every Night.  Dispense: 90 tablet; Refill: 0  -     Basic metabolic panel    3. Hypertension, essential  -     carvedilol (COREG) 12.5 MG tablet; " Take 1 tablet by mouth Every 12 (Twelve) Hours.  Dispense: 180 tablet; Refill: 0  -     losartan (Cozaar) 100 MG tablet; Take 1 tablet by mouth Daily.  Dispense: 90 tablet; Refill: 1  -     Basic metabolic panel    4. Idiopathic chronic gout of right foot without tophus  -     allopurinol (ZYLOPRIM) 100 MG tablet; Take 1 tablet by mouth Daily.  Dispense: 90 tablet; Refill: 0  -     colchicine 0.6 MG tablet; Take 1 tablet by mouth Daily.  Dispense: 90 tablet; Refill: 0  -     Uric acid    5. Acute on chronic diastolic congestive heart failure (CMS/HCC)  -     bumetanide (BUMEX) 1 MG tablet; Take 1 tablet by mouth Daily.  Dispense: 90 tablet; Refill: 0    6. Gastroesophageal reflux disease, unspecified whether esophagitis present  -     pantoprazole (PROTONIX) 40 MG EC tablet; Take 1 tablet by mouth Daily.  Dispense: 90 tablet; Refill: 0    7. Medication monitoring encounter  -     Magnesium  -     CBC w AUTO Differential    8. Morbid (severe) obesity due to excess calories (HCC)    Recheck labs as noted.  Continue with medications as prescribed by hospital.  Refills provided today.  Keep follow-up with specialist as scheduled.  We will see patient back in 3 months to check A1c. Continue with healthy nutrition, light exercise as tolerated pending cardiology recommendations.     Will complete work documents as requested.  We will do estimated return for 1 September.

## 2021-07-29 DIAGNOSIS — R71.8 ELEVATED RED BLOOD CELL COUNT: ICD-10-CM

## 2021-07-29 DIAGNOSIS — D72.829 LEUKOCYTOSIS, UNSPECIFIED TYPE: Primary | ICD-10-CM

## 2021-07-29 PROBLEM — R60.9 DEPENDENT EDEMA: Status: ACTIVE | Noted: 2021-07-29

## 2021-07-29 PROBLEM — R35.89 POLYURIA: Status: ACTIVE | Noted: 2021-07-29

## 2021-07-29 LAB
BASOPHILS # BLD AUTO: 0.15 10*3/MM3 (ref 0–0.2)
BASOPHILS NFR BLD AUTO: 1.2 % (ref 0–1.5)
BUN SERPL-MCNC: 16 MG/DL (ref 6–20)
BUN/CREAT SERPL: 16.2 (ref 7–25)
CALCIUM SERPL-MCNC: 10.2 MG/DL (ref 8.6–10.5)
CHLORIDE SERPL-SCNC: 99 MMOL/L (ref 98–107)
CO2 SERPL-SCNC: 23.2 MMOL/L (ref 22–29)
CREAT SERPL-MCNC: 0.99 MG/DL (ref 0.76–1.27)
EOSINOPHIL # BLD AUTO: 0 10*3/MM3 (ref 0–0.4)
EOSINOPHIL NFR BLD AUTO: 0 % (ref 0.3–6.2)
ERYTHROCYTE [DISTWIDTH] IN BLOOD BY AUTOMATED COUNT: 16.7 % (ref 12.3–15.4)
GLUCOSE SERPL-MCNC: 101 MG/DL (ref 65–99)
HCT VFR BLD AUTO: 58.9 % (ref 37.5–51)
HGB BLD-MCNC: 18.9 G/DL (ref 13–17.7)
IMM GRANULOCYTES # BLD AUTO: 0.29 10*3/MM3 (ref 0–0.05)
IMM GRANULOCYTES NFR BLD AUTO: 2.4 % (ref 0–0.5)
LYMPHOCYTES # BLD AUTO: 1.87 10*3/MM3 (ref 0.7–3.1)
LYMPHOCYTES NFR BLD AUTO: 15.3 % (ref 19.6–45.3)
MAGNESIUM SERPL-MCNC: 2.2 MG/DL (ref 1.6–2.6)
MCH RBC QN AUTO: 26.7 PG (ref 26.6–33)
MCHC RBC AUTO-ENTMCNC: 32.1 G/DL (ref 31.5–35.7)
MCV RBC AUTO: 83.3 FL (ref 79–97)
MONOCYTES # BLD AUTO: 0.71 10*3/MM3 (ref 0.1–0.9)
MONOCYTES NFR BLD AUTO: 5.8 % (ref 5–12)
NEUTROPHILS # BLD AUTO: 9.2 10*3/MM3 (ref 1.7–7)
NEUTROPHILS NFR BLD AUTO: 75.3 % (ref 42.7–76)
NRBC BLD AUTO-RTO: 0.1 /100 WBC (ref 0–0.2)
PLATELET # BLD AUTO: 307 10*3/MM3 (ref 140–450)
POTASSIUM SERPL-SCNC: 4.5 MMOL/L (ref 3.5–5.2)
RBC # BLD AUTO: 7.07 10*6/MM3 (ref 4.14–5.8)
SODIUM SERPL-SCNC: 140 MMOL/L (ref 136–145)
URATE SERPL-MCNC: 6.5 MG/DL (ref 3.4–7)
WBC # BLD AUTO: 12.22 10*3/MM3 (ref 3.4–10.8)

## 2021-07-30 ENCOUNTER — CONSULT (OUTPATIENT)
Dept: BARIATRICS/WEIGHT MGMT | Facility: CLINIC | Age: 46
End: 2021-07-30

## 2021-07-30 VITALS
HEIGHT: 73 IN | BODY MASS INDEX: 41.75 KG/M2 | TEMPERATURE: 98.4 F | HEART RATE: 87 BPM | WEIGHT: 315 LBS | DIASTOLIC BLOOD PRESSURE: 84 MMHG | SYSTOLIC BLOOD PRESSURE: 127 MMHG | RESPIRATION RATE: 14 BRPM

## 2021-07-30 DIAGNOSIS — G47.33 OBSTRUCTIVE SLEEP APNEA: ICD-10-CM

## 2021-07-30 DIAGNOSIS — E66.01 CLASS 3 SEVERE OBESITY WITH SERIOUS COMORBIDITY AND BODY MASS INDEX (BMI) OF 45.0 TO 49.9 IN ADULT, UNSPECIFIED OBESITY TYPE (HCC): Primary | ICD-10-CM

## 2021-07-30 DIAGNOSIS — R73.03 PREDIABETES: ICD-10-CM

## 2021-07-30 DIAGNOSIS — I50.33 ACUTE ON CHRONIC DIASTOLIC CONGESTIVE HEART FAILURE (HCC): ICD-10-CM

## 2021-07-30 DIAGNOSIS — I10 HYPERTENSION, ESSENTIAL: ICD-10-CM

## 2021-07-30 DIAGNOSIS — N52.9 ERECTILE DYSFUNCTION, UNSPECIFIED ERECTILE DYSFUNCTION TYPE: ICD-10-CM

## 2021-07-30 DIAGNOSIS — F41.1 GENERALIZED ANXIETY DISORDER: ICD-10-CM

## 2021-07-30 PROBLEM — K21.9 GERD (GASTROESOPHAGEAL REFLUX DISEASE): Status: ACTIVE | Noted: 2021-07-30

## 2021-07-30 PROBLEM — J69.0 ASPIRATION PNEUMONIA: Status: ACTIVE | Noted: 2021-07-30

## 2021-07-30 PROBLEM — E66.2 OBESITY HYPOVENTILATION SYNDROME (HCC): Status: RESOLVED | Noted: 2021-07-12 | Resolved: 2021-07-30

## 2021-07-30 PROCEDURE — 99215 OFFICE O/P EST HI 40 MIN: CPT | Performed by: NURSE PRACTITIONER

## 2021-07-30 NOTE — PROGRESS NOTES
[facility]  [address] [facility] [address]  [facility] [address]  [phone]  [address] [id number] [phone]  Dept: [phone]  7/30/2021      Norberto Keita.  [id number]  [id number]  1975  male      Chief Complaint of weight gain; unable to maintain weight loss    History of Present Illness:   Norberto is a 45 y.o. male who presents today for evaluation, education and consultation regarding weight loss surgery. He was recently diagnosed with LEILANI, congestive heart failure, and aspiration pneumonia. He is being treated with bipap, diuretics, and steroids and is feeling much better. The patient is interested in the sleeve gastrectomy.      Diet History:Norberto has been overweight for at least 10 years, has been 35 pounds or more overweight for at least 10 years, has been 100 pounds or more overweight for 5 or more years and started dieting at age 31.  The most weight Norberto lost was 40 pounds on fasting and maintained the weight loss for 1-2 years. Norberto describes his eating habits as snacking without portion control, drinking coffee and soda, overeating at mealtimes, eating to cope with anxiety. Norberto Keita has tried Atkins, Stephie Yonatan, Weight Watchers, Fasting, reduced calorie and exercising among others with success of losing up to 40 pounds, but in each instance regained the weight.     See dietician documentation for complete history.    Bariatric Surgery Evaluation: The patient is being seen for an initial visit for bariatric surgery evaluation and education.     Bariatric Co-morbidities:  sleep apnea, hypertension, dyslipidemia, cardiovascular disease, GERD, edema, mental health disease and prediabetes    Patient Active Problem List   Diagnosis   • Gout of big toe   • Generalized anxiety disorder   • Hypertension, essential   • Reduced libido   • Hypogonadism in male   • Class 3 severe obesity with body mass  index (BMI) of 45.0 to 49.9 in adult (CMS/[facility])   • Obstructive sleep apnea (bipap)   • Acute on chronic diastolic congestive heart failure (CMS/HCC)   • Mixed hyperlipidemia   • Prediabetes   • Dependent edema   • Polyuria   • GERD (gastroesophageal reflux disease)   • Aspiration pneumonia (CMS/HCC)       Past Medical History:   Diagnosis Date   • CHF (congestive heart failure), NYHA class I, chronic, diastolic (CMS/[facility])    • Heart failure (CMS/HCC)    • HTN (hypertension)    • Hyperlipidemia    • Sacroiliitis (CMS/HCC)        Past Surgical History:   Procedure Laterality Date   • HYDROCELE EXCISION / REPAIR  2009     Surgery [facility] Vaginalis Excision of Hydrocele   • KNEE SURGERY Left 1992   • VASECTOMY  2009   • VASECTOMY REVERSAL  2018    failed       Allergies   Allergen Reactions   • Lisinopril Cough         Current Outpatient Medications:   •  allopurinol (ZYLOPRIM) 100 MG tablet, Take 1 tablet by mouth Daily., Disp: 90 tablet, Rfl: 0  •  aspirin 81 MG chewable tablet, Chew 1 tablet Daily., Disp: 30 tablet, Rfl: 0  •  atorvastatin (Lipitor) 40 MG tablet, Take 1 tablet by mouth Every Night., Disp: 90 tablet, Rfl: 0  •  bumetanide (BUMEX) 1 MG tablet, Take 1 tablet by mouth Daily., Disp: 90 tablet, Rfl: 0  •  carvedilol (COREG) 12.5 MG tablet, Take 1 tablet by mouth Every 12 (Twelve) Hours., Disp: 180 tablet, Rfl: 0  •  colchicine 0.6 MG tablet, Take 1 tablet by mouth Daily., Disp: 90 tablet, Rfl: 0  •  losartan (Cozaar) 100 MG tablet, Take 1 tablet by mouth Daily., Disp: 90 tablet, Rfl: 1  •  pantoprazole (PROTONIX) 40 MG EC tablet, Take 1 tablet by mouth Daily., Disp: 90 tablet, Rfl: 0  •  predniSONE (DELTASONE) 20 MG tablet, Take one by mouth bid x4 days, take on by mouth daily for 4 days, take 1/2 daily for 6 days, Disp: 15 tablet, Rfl: 0  •  tadalafil (Cialis) 20 MG tablet, 1/2-1 PO as needed prior to activity, Disp: 30 tablet, Rfl: 5    Social History     Socioeconomic History   • Marital status:       Spouse name: Not on file   • Number of children: Not on file   • Years of education: Not on file   • Highest education level: Not on file   Tobacco Use   • Smoking status: Never Smoker   • Smokeless tobacco: Never Used   Vaping Use   • Vaping Use: Never used   Substance and Sexual Activity   • Alcohol use: Yes     Alcohol/week: 1.0 - 2.0 standard drinks     Types: 1 - 2 Standard drinks or equivalent per week     Comment: 1-2   • Drug use: No   • Sexual activity: Defer       Family History   Problem Relation Age of Onset   • Diabetes Mother    • Hyperlipidemia Mother    • Hypertension Mother    • Coronary artery disease Father    • Hyperlipidemia Father    • Hypertension Father    • Heart disease Father    • Heart failure Father    • Deep vein thrombosis Father          Review of Systems:  Review of Systems   Constitutional: Positive for fatigue. Negative for unexpected weight change.   HENT: Negative.    Respiratory: Positive for cough, chest tightness and shortness of breath.         Snoring   Cardiovascular: Positive for leg swelling.        Leg cramping with walking   Gastrointestinal: Negative for constipation.   Endocrine: Negative.    Genitourinary: Negative.    Musculoskeletal: Negative for back pain.   Neurological: Negative.    Hematological: Negative.    Psychiatric/Behavioral: Positive for sleep disturbance.       Physical Exam:  Vital Signs:  Weight: (!) 156 kg (343 lb 12.8 oz)   Body mass index is 45.44 kg/m².  Temp: 98.4 °F (36.9 °C)   Heart Rate: 87   BP: 127/84     Physical Exam  Vitals and nursing note reviewed.   Constitutional:       Appearance: He is well-developed. He is obese.   HENT:      Head: Normocephalic and atraumatic.   Eyes:      Pupils: Pupils are equal, round, and reactive to light.   Cardiovascular:      Rate and Rhythm: Regular rhythm. Tachycardia present.   Pulmonary:      Effort: Pulmonary effort is normal. No respiratory distress.      Breath sounds: Decreased air movement  present. Examination of the right-middle field reveals decreased breath sounds. Examination of the right-lower field reveals decreased breath sounds. Examination of the left-lower field reveals decreased breath sounds. Decreased breath sounds present. No wheezing.   Abdominal:      General: Bowel sounds are normal. There is no distension.      Palpations: Abdomen is soft.      Tenderness: There is no abdominal tenderness.   Musculoskeletal:         General: Normal range of motion.      Cervical back: Normal range of motion.   Skin:     General: Skin is warm and dry.   Neurological:      Mental Status: He is alert and oriented to person, place, and time.   Psychiatric:         Behavior: Behavior normal.            Assessment:         Norberto Keita is a 45 y.o. year old male with medically complicated severe obesity. Weight: (!) 156 kg (343 lb 12.8 oz), Body mass index is 45.44 kg/m². and weight related problems including sleep apnea, diabetes, hypertension, dyslipidemia, cardiovascular disease, GERD, edema, mental health disease and prediabetes.    I explained in detail, potential surgical options of interest to the patient including the RNY gastric bypass, sleeve gastrectomy, and gastric band while considering the patient's medical history. At this time, the patient expressed interest in the Laparoscopic Sleeve  All of those procedures can be performed laparoscopically but there is a chance to convert to open if any technical challenges or complications do occur.  Bariatric surgery is not cosmetic surgery but rather a tool to help a patient make a life-long commitment lifestyle changes including diet, exercise, behavior changes, and taking supplemental vitamins and minerals.    Due to the patient's BMI, history, and co-morbidities related to potential surgical complications were evaluated. Due to Norberto Keita's risk factors male will obtain the following prior to being scheduled for surgical  intervention:    A letter of medical support as well as a history and physical must be obtained from his primary care provider. The patient was given a copy of a sample form, that will suffice as their letter to take to their primary are provider.    A referral for pre-operative psychological evaluation was ordered for the patient to evaluate candidacy as well as provide mental health support, should it be warranted before or after surgery.    Radiologic studies CXR dated 7/20/21 showed evidence of basilar atelectasis, Cardiology studies most recent EKG dated 7/20/21 showed ST elevation. The patient was recently diagnosed with congestive heart failure and has started bipap therapy for LEILANI. and most recent CBC did result in elevated white count due to steroid use. CBC, CMP, Hba1c  dated 4/30/21 are likely a better baseline for him. His last A1c was 6.5 indicative of prediabetes and  CXR, EGD with biopsy and psychology clearancewere ordered at this time. These will be drawn and patient will be notified with results. Patient will complete new, pre-operative radiology prior to being scheduled for surgery. Norberto Keita will obtain clearance from a cardiologist prior to surgery.     A pre-operative diagnostic esophagogastroduodenoscopy with biopsy for evaluation will be ordered and scheduled for this patient. The risks and benefits of the procedure were discussed with the patient in detail and all questions were answered.  Possibility of perforation, bleeding, aspiration, anoxic brain injury, respiratory and/or cardiac arrest and death were discussed.   He received handouts regarding, all questions were answered.     Norberto Keita verbalized understanding related to COVID-19 pre-procedure testing policies and has consented to a preoperative test 48-72 hours before He's scheduled EGD and bariatric surgical procedure.     The risks, benefits, alternatives, and potential complications of all of the sleeve  gastrectomy explained in detail including, but not limited to death, anesthesia and medication adverse effect/DVT, pulmonary embolism, trocar site/incisional hernia, wound infection, abdominal infection, bleeding, failure to lose weight or gain weight and change in body image, metabolic complications with calcium, thiamine, vitamin B12, folate, iron, and anemia.    The patient was advised to start a high protein, low fat and low carbohydrate diet  start routine exercise including but not limited to 150 minutes per week. The patient received a resistance band along with a handout of exercises. The patient was given individualized information by our dietician along with handouts.     The patient was given information regarding the KASANDRA educational video. KASANDRA is an internet based educational video which explains the sourgical procedure and answers basic questions regarding the procedure. The patient was provided with instructions and a password to watch the video.    The patient understands the surgical procedures and the different surgical options that are available.  He understands the lifestyle changes that would be required after surgery and has agreed to participate in a pre-operative and postoperative weight management program.  He also expressed understanding of possible risks, had several questions answered and desires to proceed.    I think he is a good candidate for this surgery, and is interested in a sleeve gastrectomy.    Encounter Diagnoses   Name Primary?   • Class 3 severe obesity with serious comorbidity and body mass index (BMI) of 45.0 to 49.9 in adult, unspecified obesity type (CMS/HCC) Yes   • Acute on chronic diastolic congestive heart failure (CMS/HCC)    • Hypertension, essential    • Prediabetes    • Obstructive sleep apnea    • Generalized anxiety disorder    • Erectile dysfunction, unspecified erectile dysfunction type        Plan:    Patient will be evaluated by a bariatric dietician  psychologist before undergoing a multidisciplinary review of his candidacy. We discussed weight loss requirements prior to surgery and rationale, as well as other program requirements to ensure the safest approach to surgery. We spent time discussing different surgical procedures and plan of care throughout their lifespan to ensure long term success in achieving and maintaining a healthier weight. Patient will proceed with preoperative lab work, radiology, and endoscopy after obtaining agreed upon specialty, clearances, sleep study, and letter of support from his primary care provider.    Total time spent during this encounter today was 70 minutes and includes preparing for the visit, reviewing tests, performing a medically appropriate examination and/or evaluations, counseling and educating the patient/family/caregiver, ordering medications, tests, or procedures, documenting information in the medical record and independently interpreting results and communicating that information with the patient/family/caregiver  Sapna Benton, MICHELLE  7/30/2021

## 2021-08-02 ENCOUNTER — READMISSION MANAGEMENT (OUTPATIENT)
Dept: CALL CENTER | Facility: HOSPITAL | Age: 46
End: 2021-08-02

## 2021-08-02 NOTE — OUTREACH NOTE
CHF Week 2 Survey      Responses   [facility] patient discharged from?  [place]   Does the patient have one of the following disease processes/diagnoses(primary or secondary)?  CHF   Week 2 attempt successful?  Yes   Call start time  1708   Call end time  1710   Discharge diagnosis  Acute respiratory failure with hypoxia Acute pulmonary edema    Meds reviewed with patient/caregiver?  Yes   Is the patient having any side effects they believe may be caused by any medication additions or changes?  No   Does the patient have all medications ordered at discharge?  Yes   Is the patient taking all medications as directed (includes completed medication regime)?  Yes   Does the patient have a primary care provider?   Yes   Does the patient have an appointment with their PCP within 7 days of discharge?  Yes   Has the patient kept scheduled appointments due by today?  Yes   Has home health visited the patient within 72 hours of discharge?  N/A   Psychosocial issues?  No   Did the patient receive a copy of their discharge instructions?  Yes   Nursing interventions  Reviewed instructions with patient   What is the patient's perception of their health status since discharge?  Improving   Nursing interventions  Nurse provided patient education   Is the patient weighing daily?  Yes   Does the patient have scales?  Yes   Is the patient able to teach back Heart Failure diet management?  Yes   Is the patient able to teach back Heart Failure Zones?  Yes   Is the patient/caregiver able to teach back the hierarchy of who to call/visit for symptoms/problems? PCP, Specialist, Home health nurse, Urgent Care, ED, 911  Yes   Additional teach back comments  states sleeps well with Bipap machine, states losing weight with new diet   CHF Week 2 call completed?  Yes          Rahel Haynes RN

## 2021-08-05 ENCOUNTER — TELEPHONE (OUTPATIENT)
Dept: BARIATRICS/WEIGHT MGMT | Facility: CLINIC | Age: 46
End: 2021-08-05

## 2021-08-05 ENCOUNTER — APPOINTMENT (OUTPATIENT)
Dept: BARIATRICS/WEIGHT MGMT | Facility: CLINIC | Age: 46
End: 2021-08-05

## 2021-08-12 ENCOUNTER — READMISSION MANAGEMENT (OUTPATIENT)
Dept: CALL CENTER | Facility: HOSPITAL | Age: 46
End: 2021-08-12

## 2021-08-12 NOTE — OUTREACH NOTE
CHF Week 3 Survey      Responses   [facility] patient discharged from?  [place]   Does the patient have one of the following disease processes/diagnoses(primary or secondary)?  CHF   Week 3 attempt successful?  Yes   Call start time  1726   Call end time  1729   Discharge diagnosis  Acute respiratory failure with hypoxia Acute pulmonary edema    Meds reviewed with patient/caregiver?  Yes   Is the patient taking all medications as directed (includes completed medication regime)?  Yes   Comments regarding appointments  Cardio appt upcoming.    Has the patient kept scheduled appointments due by today?  Yes   What DME was ordered?  [facility]. [employer]   Pulse Ox monitoring  Intermittent   O2 Sat comments  93-94% RA   Psychosocial issues?  No   Did the patient receive a copy of their discharge instructions?  Yes   Nursing interventions  Reviewed instructions with patient   What is the patient's perception of their health status since discharge?  Improving   Nursing interventions  Nurse provided patient education   Is the patient weighing daily?  Yes [No wt gain]   Does the patient have scales?  Yes   Daily weight interventions  Education provided on importance of daily weight   Is the patient able to teach back Heart Failure Zones?  Yes   Is the patient able to teach back signs and symptoms of worsening condition? (i.e. weight gain, shortness of air, etc.)  Yes   If the patient is a current smoker, are they able to teach back resources for cessation?  Not a smoker   Is the patient/caregiver able to teach back the hierarchy of who to call/visit for symptoms/problems? PCP, Specialist, Home health nurse, Urgent Care, ED, 911  Yes   Additional teach back comments  States he is taking his meds, watching his diet and using his BiPap. No needs at this time.    CHF Week 3 call completed?  Yes          Claudia Paul RN

## 2021-08-27 LAB — FUNGUS WND CULT: NORMAL

## 2021-08-31 ENCOUNTER — OFFICE VISIT (OUTPATIENT)
Dept: CARDIOLOGY | Facility: CLINIC | Age: 46
End: 2021-08-31

## 2021-08-31 VITALS
BODY MASS INDEX: 41.75 KG/M2 | HEIGHT: 73 IN | SYSTOLIC BLOOD PRESSURE: 118 MMHG | HEART RATE: 85 BPM | OXYGEN SATURATION: 95 % | DIASTOLIC BLOOD PRESSURE: 86 MMHG | WEIGHT: 315 LBS

## 2021-08-31 DIAGNOSIS — Z91.89 CHRONIC CHEST PAIN WITH LOW TO MODERATE RISK FOR CAD: Primary | ICD-10-CM

## 2021-08-31 DIAGNOSIS — R07.9 CHRONIC CHEST PAIN WITH LOW TO MODERATE RISK FOR CAD: Primary | ICD-10-CM

## 2021-08-31 DIAGNOSIS — G89.29 CHRONIC CHEST PAIN WITH LOW TO MODERATE RISK FOR CAD: Primary | ICD-10-CM

## 2021-08-31 PROCEDURE — 99214 OFFICE O/P EST MOD 30 MIN: CPT | Performed by: PHYSICIAN ASSISTANT

## 2021-08-31 NOTE — PROGRESS NOTES
[facility] at [facility]   OFFICE NOTE      Norberto Keita  1975  PCP: Cam Cheung MD    SUBJECTIVE:   Norberto Keita is a 45 y.o. male seen for a follow up visit regarding the following:     CC:Acute SHF    HPI:   45 year old male presents for follow up regarding CHF, Hypertensive heart disease and need for pre op evaluation prior to gastric sleeve. The patient was admitted to [facility] on 7/12/20201 because he ran out of his diuretics and he had progressive CHF symptoms. He was diuresed, placed on CPAP/bipap and his bp medications were adjusted. He states he has made significant progress in keeping bp controlled and with lifestyle changes. He would like to consider gastric sleeve surgery to loose weight.      Cardiac PMH: (Old records have been reviewed and summarized below)  1. Hypertensive heart disease  a. Acute CHF  b. Echocardiogram severe LVH, Normal EF 55%, RVSP<35mmHg . RVE.   2. LEILANI, on CPAP  3. Super Morbid Obesity  4. HTN: Controlled.   5. HLD: Lipitor.     Past Medical History, Past Surgical History, Family history, Social History, and Medications were all reviewed with the patient today and updated as necessary.       Current Outpatient Medications:   •  allopurinol (ZYLOPRIM) 100 MG tablet, Take 1 tablet by mouth Daily., Disp: 90 tablet, Rfl: 0  •  aspirin 81 MG chewable tablet, Chew 1 tablet Daily., Disp: 30 tablet, Rfl: 0  •  atorvastatin (Lipitor) 40 MG tablet, Take 1 tablet by mouth Every Night., Disp: 90 tablet, Rfl: 0  •  bumetanide (BUMEX) 1 MG tablet, Take 1 tablet by mouth Daily., Disp: 90 tablet, Rfl: 0  •  carvedilol (COREG) 12.5 MG tablet, Take 1 tablet by mouth Every 12 (Twelve) Hours., Disp: 180 tablet, Rfl: 0  •  colchicine 0.6 MG tablet, Take 1 tablet by mouth Daily., Disp: 90 tablet, Rfl: 0  •  losartan (Cozaar) 100 MG tablet, Take 1 tablet by mouth Daily., Disp: 90 tablet, Rfl: 1  •  pantoprazole (PROTONIX) 40 MG EC tablet, Take 1 tablet by mouth  Daily., Disp: 90 tablet, Rfl: 0  •  predniSONE (DELTASONE) 20 MG tablet, Take one by mouth bid x4 days, take on by mouth daily for 4 days, take 1/2 daily for 6 days, Disp: 15 tablet, Rfl: 0  •  tadalafil (Cialis) 20 MG tablet, 1/2-1 PO as needed prior to activity, Disp: 30 tablet, Rfl: 5      Allergies   Allergen Reactions   • Lisinopril Cough     Patient Active Problem List   Diagnosis   • Gout of big toe   • Generalized anxiety disorder   • Hypertension, essential   • Reduced libido   • Hypogonadism in male   • Class 3 severe obesity with body mass index (BMI) of 45.0 to 49.9 in adult (CMS/[facility], located [facility])   • Obstructive sleep apnea (bipap)   • Acute on chronic diastolic congestive heart failure (CMS/[facility], located [facility])   • Mixed hyperlipidemia   • Prediabetes   • Dependent edema   • Polyuria   • GERD (gastroesophageal reflux disease)   • Aspiration pneumonia (CMS/[facility] located [facility])     Past Medical History:   Diagnosis Date   • CHF (congestive heart failure), NYHA class I, chronic, diastolic (CMS/[facility] located [facility])    • Heart failure (CMS/[facility] located [facility])    • HTN (hypertension)    • Hyperlipidemia    • Sacroiliitis (CMS/[facility], located [facility])      Past Surgical History:   Procedure Laterality Date   • HYDROCELE EXCISION / REPAIR  2009     Surgery [facility] Vaginalis Excision of Hydrocele   • KNEE SURGERY Left 1992   • VASECTOMY  2009   • VASECTOMY REVERSAL  2018    failed     Family History   Problem Relation Age of Onset   • Diabetes Mother    • Hyperlipidemia Mother    • Hypertension Mother    • Coronary artery disease Father    • Hyperlipidemia Father    • Hypertension Father    • Heart disease Father    • Heart failure Father    • Deep vein thrombosis Father      Social History     Tobacco Use   • Smoking status: Never Smoker   • Smokeless tobacco: Never Used   Substance Use Topics   • Alcohol use: Yes     Alcohol/week: 1.0 - 2.0 standard drinks     Types: 1 - 2 Standard drinks or equivalent per week     Comment: 1-2       ROS:  Review of Symptoms:  General: no recent weight loss/gain,  "weakness or fatigue  Skin: no rashes, lumps, or other skin changes  HEENT: no dizziness, lightheadedness, or vision changes  Respiratory: no cough or hemoptysis  Cardiovascular: no palpitations, and tachycardia  Gastrointestinal: no black/tarry stools or diarrhea  Urinary: no change in frequency or urgency  Peripheral Vascular: no claudication or leg cramps  Musculoskeletal: no muscle or joint pain/stiffness  Psychiatric: no depression or excessive stress  Neurological: no sensory or motor loss, no syncope  Hematologic: no anemia, easy bruising or bleeding  Endocrine: no thyroid problems, nor heat or cold intolerance    PHYSICAL EXAM:    /86 (BP Location: Left arm, Patient Position: Sitting)   Pulse 85   Ht 185.4 cm (73\")   Wt (!) 156 kg (343 lb)   SpO2 95%   BMI 45.25 kg/m²        Wt Readings from Last 5 Encounters:   08/31/21 (!) 156 kg (343 lb)   07/30/21 (!) 156 kg (343 lb 12.8 oz)   07/28/21 (!) 154 kg (340 lb 8 oz)   07/27/21 (!) 155 kg (342 lb)   07/22/21 (!) 170 kg (375 lb)       BP Readings from Last 5 Encounters:   08/31/21 118/86   07/30/21 127/84   07/28/21 130/90   07/27/21 139/91   07/22/21 125/92       General appearance - Alert, well appearing, and in no distress   Mental status - Affect appropriate to mood.  Eyes - Sclerae anicteric,  ENMT - Hearing grossly normal bilaterally, Dental hygiene good.  Neck - Carotids upstroke normal bilaterally, no bruits, no JVD.  Resp - Clear to auscultation, no wheezes, rales or rhonchi, symmetric air entry.  Heart - Normal rate, regular rhythm, normal S1, S2, no murmurs, rubs, clicks or gallops.  GI - Soft, nontender, nondistended, no masses or organomegaly.  Neurological - Grossly intact - normal speech, no focal findings  Musculoskeletal - No joint tenderness, deformity or swelling, no muscular tenderness noted.  Extremities - Peripheral pulses normal,Trace pedal edema, no clubbing or cyanosis.  Skin - Normal coloration and turgor.  Psych -  oriented " to person, place, and time.    Medical problems and test results were reviewed with the patient today.         ASSESSMENT   1. SOB with exertion  2. CHF, Acute with admission to [facility]-Improved with diuretics   3. Super Morbid Obesity, Patient considering Gastric sleeve.   4. LEILANI: CPAP  5. HTN: Improved control   6. HLD: Statin  7. Family history of CAD    PLAN  · Secondary to multiple risk factors, continued sob with exertion will rule out ischemic heart disease with aStress MPS  · Continue diet , exercise, weight loss. Compliance with medication.   · Patient may return to work as scheulded  · Follow up 6 months     8/31/2021  12:33 EDT  Will Yony ESPAÑA

## 2021-09-07 ENCOUNTER — TELEPHONE (OUTPATIENT)
Dept: FAMILY MEDICINE CLINIC | Facility: CLINIC | Age: 46
End: 2021-09-07

## 2021-09-07 NOTE — TELEPHONE ENCOUNTER
No, this would have to come from Naresh who had RTW on 9/1/21.  Will have to wait for her to address

## 2021-09-07 NOTE — TELEPHONE ENCOUNTER
Caller: Norberto Keita    Relationship: Self    Best call back number:     What form or medical record are you requesting: RETURN TO WORK NOTE    Who is requesting this form or medical record from you: PATIENT    How would you like to receive the form or medical records (pick-up, mail, fax): PATIENT WOULD LIKE THIS LETTER IN MY CHART; HE ADVISED THAT [employer] DID HIS DISABILITY PAPERWORK AND HE HAS BEEN RELEASED FROM CARDIOLOGY     Timeframe paperwork needed: ASAP    Additional notes: JUST PLACE THIS IN MY CHART SO HE CAN PRINT IT OFF TO  GIVE TO HIS EMPLOYER;  GETACHEW MAY NEED TO CHECK THE PACKET TO MAKE SURE THERE WASN'T ANYTHING ELSE THAT WAS NEEDED FOR HIS WORKPLACE

## 2021-09-10 ENCOUNTER — HOSPITAL ENCOUNTER (OUTPATIENT)
Dept: CARDIOLOGY | Facility: HOSPITAL | Age: 46
Discharge: HOME | End: 2021-09-10
Admitting: PHYSICIAN ASSISTANT

## 2021-09-10 VITALS
HEART RATE: 85 BPM | OXYGEN SATURATION: 95 % | SYSTOLIC BLOOD PRESSURE: 135 MMHG | BODY MASS INDEX: 41.75 KG/M2 | HEIGHT: 73 IN | DIASTOLIC BLOOD PRESSURE: 91 MMHG | WEIGHT: 315 LBS

## 2021-09-10 DIAGNOSIS — G89.29 CHRONIC CHEST PAIN WITH LOW TO MODERATE RISK FOR CAD: ICD-10-CM

## 2021-09-10 DIAGNOSIS — Z91.89 CHRONIC CHEST PAIN WITH LOW TO MODERATE RISK FOR CAD: ICD-10-CM

## 2021-09-10 DIAGNOSIS — R07.9 CHRONIC CHEST PAIN WITH LOW TO MODERATE RISK FOR CAD: ICD-10-CM

## 2021-09-10 LAB
BH CV REST NUCLEAR ISOTOPE DOSE: 31.3 MCI
BH CV STRESS BP STAGE 2: NORMAL
BH CV STRESS BP STAGE 3: NORMAL
BH CV STRESS COMMENTS STAGE 1: NORMAL
BH CV STRESS DOSE REGADENOSON STAGE 1: 0.4
BH CV STRESS DURATION MIN STAGE 1: 1
BH CV STRESS DURATION MIN STAGE 2: 1
BH CV STRESS DURATION MIN STAGE 3: 1
BH CV STRESS DURATION MIN STAGE 4: 1
BH CV STRESS DURATION SEC STAGE 1: 0
BH CV STRESS DURATION SEC STAGE 2: 0
BH CV STRESS DURATION SEC STAGE 3: 0
BH CV STRESS DURATION SEC STAGE 4: 0
BH CV STRESS HR STAGE 1: 85
BH CV STRESS HR STAGE 2: 88
BH CV STRESS HR STAGE 3: 90
BH CV STRESS HR STAGE 4: 87
BH CV STRESS NUCLEAR ISOTOPE DOSE: 31.4 MCI
BH CV STRESS O2 STAGE 1: 97
BH CV STRESS O2 STAGE 2: 98
BH CV STRESS O2 STAGE 3: 98
BH CV STRESS O2 STAGE 4: 97
BH CV STRESS PROTOCOL 1: NORMAL
BH CV STRESS RECOVERY BP: NORMAL MMHG
BH CV STRESS RECOVERY HR: 83 BPM
BH CV STRESS RECOVERY O2: 95 %
BH CV STRESS STAGE 1: 1
BH CV STRESS STAGE 2: 2
BH CV STRESS STAGE 3: 3
BH CV STRESS STAGE 4: 4
LV EF NUC BP: 56 %
MAXIMAL PREDICTED HEART RATE: 176 BPM
PERCENT MAX PREDICTED HR: 53.98 %
STRESS BASELINE BP: NORMAL MMHG
STRESS BASELINE HR: 89 BPM
STRESS O2 SAT REST: 95 %
STRESS PERCENT HR: 64 %
STRESS POST ESTIMATED WORKLOAD: 1 METS
STRESS POST EXERCISE DUR MIN: 4 MIN
STRESS POST EXERCISE DUR SEC: 0 SEC
STRESS POST O2 SAT PEAK: 98 %
STRESS POST PEAK BP: NORMAL MMHG
STRESS POST PEAK HR: 95 BPM
STRESS TARGET HR: 150 BPM

## 2021-09-10 PROCEDURE — 78492 MYOCRD IMG PET MLT RST&STRS: CPT | Performed by: INTERNAL MEDICINE

## 2021-09-10 PROCEDURE — 25010000002 REGADENOSON 0.4 MG/5ML SOLUTION: Performed by: PHYSICIAN ASSISTANT

## 2021-09-10 PROCEDURE — 93018 CV STRESS TEST I&R ONLY: CPT | Performed by: INTERNAL MEDICINE

## 2021-09-10 PROCEDURE — 0 RUBIDIUM CHLORIDE: Performed by: PHYSICIAN ASSISTANT

## 2021-09-10 PROCEDURE — 78492 MYOCRD IMG PET MLT RST&STRS: CPT

## 2021-09-10 PROCEDURE — 93017 CV STRESS TEST TRACING ONLY: CPT

## 2021-09-10 PROCEDURE — A9555 RB82 RUBIDIUM: HCPCS | Performed by: PHYSICIAN ASSISTANT

## 2021-09-10 RX ADMIN — REGADENOSON 0.4 MG: 0.08 INJECTION, SOLUTION INTRAVENOUS at 13:45

## 2021-09-10 RX ADMIN — RUBIDIUM CHLORIDE RB-82 1 DOSE: 150 INJECTION, SOLUTION INTRAVENOUS at 13:46

## 2021-09-10 RX ADMIN — RUBIDIUM CHLORIDE RB-82 1 DOSE: 150 INJECTION, SOLUTION INTRAVENOUS at 13:32

## 2021-09-22 ENCOUNTER — TELEMEDICINE (OUTPATIENT)
Dept: SLEEP MEDICINE | Facility: HOSPITAL | Age: 46
End: 2021-09-22

## 2021-09-22 DIAGNOSIS — G47.33 OBSTRUCTIVE SLEEP APNEA, ADULT: Primary | ICD-10-CM

## 2021-09-22 PROCEDURE — 99213 OFFICE O/P EST LOW 20 MIN: CPT | Performed by: NURSE PRACTITIONER

## 2021-09-22 NOTE — PROGRESS NOTES
"  Chief Complaint:   Chief Complaint   Patient presents with   • Follow-up       HPI:    Norberto Keita is a 45 y.o. male here for follow-up of sleep apnea.  Patient has a history of hypertension, diastolic heart failure, hyperlipidemia, hypergonadism, severe obesity, prediabetes, GERD, anxiety, edema, and severe obstructive sleep apnea.  Patient was seen here in consult 7/15/2021 for snoring, witnessed apneas, gasping, and morning headaches.  He did have a sleep study 10+ years ago and was told he had severe sleep apnea.  Patient only used his CPAP for a short while and due to some situations going on in his life he did return machine and quit using.  I was unable to find a copy of his old sleep study and he will be having a sleep study done here in November.  Patient does plan on keeping this appointment.  Patient states he is doing \"very well, I feel so much better.\"  He is sleeping 6 to 7 hours nightly and feels very rested upon awakening.  He will go to sleep instantly and does not get up during the night.  Patient is having no complications or concerns regarding BiPAP and does wish to continue.  We will also be discussing the results of his study with him in November.  Patient has C[place] score of 0/24      Current medications are:   Current Outpatient Medications:   •  allopurinol (ZYLOPRIM) 100 MG tablet, Take 1 tablet by mouth Daily., Disp: 90 tablet, Rfl: 0  •  aspirin 81 MG chewable tablet, Chew 1 tablet Daily., Disp: 30 tablet, Rfl: 0  •  atorvastatin (Lipitor) 40 MG tablet, Take 1 tablet by mouth Every Night., Disp: 90 tablet, Rfl: 0  •  bumetanide (BUMEX) 1 MG tablet, Take 1 tablet by mouth Daily., Disp: 90 tablet, Rfl: 0  •  carvedilol (COREG) 12.5 MG tablet, Take 1 tablet by mouth Every 12 (Twelve) Hours., Disp: 180 tablet, Rfl: 0  •  colchicine 0.6 MG tablet, Take 1 tablet by mouth Daily., Disp: 90 tablet, Rfl: 0  •  losartan (Cozaar) 100 MG tablet, Take 1 tablet by mouth Daily., Disp: 90 tablet, " Rfl: 1  •  pantoprazole (PROTONIX) 40 MG EC tablet, Take 1 tablet by mouth Daily., Disp: 90 tablet, Rfl: 0  •  predniSONE (DELTASONE) 20 MG tablet, Take one by mouth bid x4 days, take on by mouth daily for 4 days, take 1/2 daily for 6 days, Disp: 15 tablet, Rfl: 0  •  tadalafil (Cialis) 20 MG tablet, 1/2-1 PO as needed prior to activity, Disp: 30 tablet, Rfl: 5.      The patient's relevant past medical, surgical, family and social history were reviewed and updated in Epic as appropriate.       Review of Systems   Constitutional: Positive for unexpected weight change.   Respiratory: Positive for apnea, cough and wheezing.    Cardiovascular: Positive for leg swelling.   Gastrointestinal:        Heartburn   Musculoskeletal: Positive for arthralgias, gait problem and joint swelling.   Psychiatric/Behavioral: Positive for sleep disturbance.   All other systems reviewed and are negative.        Objective:    Physical Exam  Pulmonary:      Effort: Pulmonary effort is normal. No respiratory distress.   Neurological:      Mental Status: He is alert and oriented to person, place, and time.   Psychiatric:         Mood and Affect: Mood normal.         Behavior: Behavior normal.         Thought Content: Thought content normal.         Judgment: Judgment normal.       60/60 days of use  Greater than 4-hour use 82%  AHI 1.6  Maximum IPAP 25  Minimum EPAP 10  Pressure support 4    ASSESSMENT/PLAN    Diagnoses and all orders for this visit:    1. Obstructive sleep apnea, adult (Primary)  -     CPAP Therapy            1. Counseled patient regarding multimodal approach with healthy nutrition, healthy sleep, regular physical activity, social activities, counseling, and medications. Encouraged to practice lateral sleep position. Avoid alcohol and sedatives close to bedtime.  2.   Patient to keep appointment for in lab study in November.  We have reviewed his compliance today and I will see him back in 1 year or sooner symptoms  warrant.  Unable to complete visit using a video connection to the patient. A phone visit was used to complete this visits. Total time of discussion was 15 minutes.  I have reviewed the results of my evaluation and impression and discussed my recommendations in detail with the patient.      Signed by  MICHELLE Clark    September 22, 2021      CC: Cam Cheung MD          No ref. provider found

## 2021-10-04 DIAGNOSIS — I10 HYPERTENSION, ESSENTIAL: ICD-10-CM

## 2021-10-04 RX ORDER — ATENOLOL 100 MG/1
TABLET ORAL
Qty: 90 TABLET | Refills: 0 | OUTPATIENT
Start: 2021-10-04

## 2021-10-04 RX ORDER — LISINOPRIL 40 MG/1
TABLET ORAL
Qty: 90 TABLET | Refills: 0 | OUTPATIENT
Start: 2021-10-04

## 2021-10-04 RX ORDER — AMLODIPINE BESYLATE 10 MG/1
TABLET ORAL
Qty: 90 TABLET | Refills: 0 | OUTPATIENT
Start: 2021-10-04

## 2021-10-18 DIAGNOSIS — R06.83 SNORING: Primary | ICD-10-CM

## 2021-10-18 DIAGNOSIS — G47.33 OBSTRUCTIVE SLEEP APNEA, ADULT: ICD-10-CM

## 2021-10-28 ENCOUNTER — OFFICE VISIT (OUTPATIENT)
Dept: FAMILY MEDICINE CLINIC | Facility: CLINIC | Age: 46
End: 2021-10-28

## 2021-10-28 VITALS
OXYGEN SATURATION: 97 % | DIASTOLIC BLOOD PRESSURE: 80 MMHG | SYSTOLIC BLOOD PRESSURE: 130 MMHG | RESPIRATION RATE: 20 BRPM | HEIGHT: 73 IN | TEMPERATURE: 98.6 F | BODY MASS INDEX: 41.75 KG/M2 | HEART RATE: 91 BPM | WEIGHT: 315 LBS

## 2021-10-28 DIAGNOSIS — E78.2 MIXED HYPERLIPIDEMIA: ICD-10-CM

## 2021-10-28 DIAGNOSIS — E66.01 MORBID (SEVERE) OBESITY DUE TO EXCESS CALORIES (HCC): ICD-10-CM

## 2021-10-28 DIAGNOSIS — Z51.81 MEDICATION MONITORING ENCOUNTER: ICD-10-CM

## 2021-10-28 DIAGNOSIS — E11.9 TYPE 2 DIABETES MELLITUS WITHOUT COMPLICATION, WITHOUT LONG-TERM CURRENT USE OF INSULIN (HCC): Primary | ICD-10-CM

## 2021-10-28 DIAGNOSIS — M1A.0790 CHRONIC GOUT OF FOOT, UNSPECIFIED CAUSE, UNSPECIFIED LATERALITY: ICD-10-CM

## 2021-10-28 PROCEDURE — 99213 OFFICE O/P EST LOW 20 MIN: CPT | Performed by: PHYSICIAN ASSISTANT

## 2021-10-28 RX ORDER — DIAZEPAM 10 MG/1
TABLET ORAL
COMMUNITY
Start: 2021-10-12 | End: 2021-12-07

## 2021-10-28 NOTE — PROGRESS NOTES
"Subjective   Norberto Keita is a 45 y.o. male.     History of Present Illness   Patient presents for 3-month follow-up for type 2 diabetes mellitus. Patient has recently been on the borderline of prediabetes and diabetes. Currently not on any medications for diabetes. Patient trying to work on weight loss, nutrition changes, and exercises to regulate.  Patient notes that he is also pursuing bariatric gastric sleeve surgery. Has already had consultation and required work-up. Insurance is also approved the procedure. Patient does have some forms from the bariatric center he would like completed about where weight was discussed at previous visits.    Eating 3 meals per day   Eats out 3-4 X days per week. Fast food and restaurant  Eating a lot of carbohydrates   Eats sweets some, however not much.  Does eat quite a bit of chips and crackers and cheeses     Walking 1-2 miles per day most days of week     Current BMI  48%    Patient has had notable weight gain since last visit, however he did lose quite a bit of weight while he was hospitalized.     Follows with cardiology, however is due for cholesterol check and labs today     The following portions of the patient's history were reviewed and updated as appropriate: allergies, current medications, past family history, past medical history, past social history, past surgical history and problem list.    Review of Systems   Constitutional: Negative for chills and fever.   HENT: Negative for congestion and sore throat.    Respiratory: Negative for cough, shortness of breath and wheezing.    Cardiovascular: Negative for chest pain.   Gastrointestinal: Negative for diarrhea, nausea and vomiting.   Skin: Negative for rash.       Objective    Blood pressure 130/80, pulse 91, temperature 98.6 °F (37 °C), resp. rate 20, height 185.4 cm (73\"), weight (!) 165 kg (363 lb 8 oz), SpO2 97 %.   Body mass index is 47.96 kg/m².     Physical Exam  Vitals and nursing note reviewed. "   Constitutional:       Appearance: Normal appearance. He is well-developed. He is obese.   HENT:      Head: Normocephalic and atraumatic.      Right Ear: External ear normal.      Left Ear: External ear normal.      Nose: Nose normal.   Eyes:      Conjunctiva/sclera: Conjunctivae normal.   Neck:      Thyroid: No thyromegaly.      Trachea: No tracheal deviation.   Cardiovascular:      Rate and Rhythm: Normal rate and regular rhythm.      Heart sounds: Normal heart sounds.   Pulmonary:      Effort: Pulmonary effort is normal. No respiratory distress.      Breath sounds: Normal breath sounds. No wheezing or rales.   Chest:      Chest wall: No tenderness.   Musculoskeletal:      Cervical back: Normal range of motion and neck supple.   Lymphadenopathy:      Cervical: No cervical adenopathy.   Skin:     General: Skin is warm and dry.   Neurological:      Mental Status: He is alert and oriented to person, place, and time.   Psychiatric:         Mood and Affect: Mood normal.         Behavior: Behavior normal.         Thought Content: Thought content normal.         Judgment: Judgment normal.         Assessment/Plan   Diagnoses and all orders for this visit:    1. Type 2 diabetes mellitus without complication, without long-term current use of insulin (HCC) (Primary)  -     Hemoglobin A1c  -     CBC w AUTO Differential  -     Comprehensive metabolic panel    2. Mixed hyperlipidemia  -     Lipid Panel    3. Chronic gout of foot, unspecified cause, unspecified laterality  -     Uric Acid    4. Medication monitoring encounter  -     Magnesium    5. Morbid (severe) obesity due to excess calories (HCC)      Proceed with labs as outlined in plan   F/u pending labs   Continue to work on weight loss, nutrition and exercise efforts

## 2021-10-29 ENCOUNTER — APPOINTMENT (OUTPATIENT)
Dept: PREADMISSION TESTING | Facility: HOSPITAL | Age: 46
End: 2021-10-29

## 2021-10-29 LAB
ALBUMIN SERPL-MCNC: 4.7 G/DL (ref 3.5–5.2)
ALBUMIN/GLOB SERPL: 2 G/DL
ALP SERPL-CCNC: 101 U/L (ref 39–117)
ALT SERPL-CCNC: 79 U/L (ref 1–41)
AST SERPL-CCNC: 42 U/L (ref 1–40)
BASOPHILS # BLD AUTO: 0.09 10*3/MM3 (ref 0–0.2)
BASOPHILS NFR BLD AUTO: 1.1 % (ref 0–1.5)
BILIRUB SERPL-MCNC: 1.3 MG/DL (ref 0–1.2)
BUN SERPL-MCNC: 19 MG/DL (ref 6–20)
BUN/CREAT SERPL: 22.9 (ref 7–25)
CALCIUM SERPL-MCNC: 9.7 MG/DL (ref 8.6–10.5)
CHLORIDE SERPL-SCNC: 99 MMOL/L (ref 98–107)
CHOLEST SERPL-MCNC: 166 MG/DL (ref 0–200)
CO2 SERPL-SCNC: 27.6 MMOL/L (ref 22–29)
CREAT SERPL-MCNC: 0.83 MG/DL (ref 0.76–1.27)
EOSINOPHIL # BLD AUTO: 0 10*3/MM3 (ref 0–0.4)
EOSINOPHIL NFR BLD AUTO: 0 % (ref 0.3–6.2)
ERYTHROCYTE [DISTWIDTH] IN BLOOD BY AUTOMATED COUNT: 16.3 % (ref 12.3–15.4)
GLOBULIN SER CALC-MCNC: 2.3 GM/DL
GLUCOSE SERPL-MCNC: 121 MG/DL (ref 65–99)
HBA1C MFR BLD: 5.9 % (ref 4.8–5.6)
HCT VFR BLD AUTO: 49.3 % (ref 37.5–51)
HDLC SERPL-MCNC: 38 MG/DL (ref 40–60)
HGB BLD-MCNC: 16 G/DL (ref 13–17.7)
IMM GRANULOCYTES # BLD AUTO: 0.16 10*3/MM3 (ref 0–0.05)
IMM GRANULOCYTES NFR BLD AUTO: 2 % (ref 0–0.5)
LDLC SERPL CALC-MCNC: 86 MG/DL (ref 0–100)
LYMPHOCYTES # BLD AUTO: 2.17 10*3/MM3 (ref 0.7–3.1)
LYMPHOCYTES NFR BLD AUTO: 27 % (ref 19.6–45.3)
MAGNESIUM SERPL-MCNC: 1.9 MG/DL (ref 1.6–2.6)
MCH RBC QN AUTO: 29.5 PG (ref 26.6–33)
MCHC RBC AUTO-ENTMCNC: 32.5 G/DL (ref 31.5–35.7)
MCV RBC AUTO: 90.8 FL (ref 79–97)
MONOCYTES # BLD AUTO: 0.69 10*3/MM3 (ref 0.1–0.9)
MONOCYTES NFR BLD AUTO: 8.6 % (ref 5–12)
NEUTROPHILS # BLD AUTO: 4.93 10*3/MM3 (ref 1.7–7)
NEUTROPHILS NFR BLD AUTO: 61.3 % (ref 42.7–76)
NRBC BLD AUTO-RTO: 0 /100 WBC (ref 0–0.2)
PLATELET # BLD AUTO: 218 10*3/MM3 (ref 140–450)
POTASSIUM SERPL-SCNC: 4.2 MMOL/L (ref 3.5–5.2)
PROT SERPL-MCNC: 7 G/DL (ref 6–8.5)
RBC # BLD AUTO: 5.43 10*6/MM3 (ref 4.14–5.8)
SARS-COV-2 RNA PNL SPEC NAA+PROBE: NOT DETECTED
SODIUM SERPL-SCNC: 137 MMOL/L (ref 136–145)
TRIGL SERPL-MCNC: 252 MG/DL (ref 0–150)
URATE SERPL-MCNC: 6.2 MG/DL (ref 3.4–7)
VLDLC SERPL CALC-MCNC: 42 MG/DL (ref 5–40)
WBC # BLD AUTO: 8.04 10*3/MM3 (ref 3.4–10.8)

## 2021-10-29 PROCEDURE — U0004 COV-19 TEST NON-CDC HGH THRU: HCPCS | Performed by: NURSE PRACTITIONER

## 2021-11-01 ENCOUNTER — HOSPITAL ENCOUNTER (OUTPATIENT)
Dept: SLEEP MEDICINE | Facility: HOSPITAL | Age: 46
Discharge: HOME | End: 2021-11-01
Admitting: INTERNAL MEDICINE

## 2021-11-01 VITALS
BODY MASS INDEX: 41.75 KG/M2 | HEART RATE: 101 BPM | DIASTOLIC BLOOD PRESSURE: 104 MMHG | WEIGHT: 315 LBS | OXYGEN SATURATION: 95 % | HEIGHT: 73 IN | SYSTOLIC BLOOD PRESSURE: 160 MMHG

## 2021-11-01 DIAGNOSIS — G47.33 OBSTRUCTIVE SLEEP APNEA: ICD-10-CM

## 2021-11-01 DIAGNOSIS — D72.0: Primary | ICD-10-CM

## 2021-11-01 DIAGNOSIS — J96.01 ACUTE RESPIRATORY FAILURE WITH HYPOXIA (HCC): ICD-10-CM

## 2021-11-01 PROCEDURE — 95811 POLYSOM 6/>YRS CPAP 4/> PARM: CPT

## 2021-11-01 PROCEDURE — 95811 POLYSOM 6/>YRS CPAP 4/> PARM: CPT | Performed by: INTERNAL MEDICINE

## 2021-11-02 ENCOUNTER — TELEPHONE (OUTPATIENT)
Dept: CARDIOLOGY | Facility: CLINIC | Age: 46
End: 2021-11-02

## 2021-11-02 DIAGNOSIS — G47.33 OBSTRUCTIVE SLEEP APNEA: Primary | ICD-10-CM

## 2021-11-02 DIAGNOSIS — M1A.0710 IDIOPATHIC CHRONIC GOUT OF RIGHT FOOT WITHOUT TOPHUS: ICD-10-CM

## 2021-11-02 RX ORDER — COLCHICINE 0.6 MG/1
0.6 TABLET ORAL DAILY
Qty: 90 TABLET | Refills: 0 | Status: SHIPPED | OUTPATIENT
Start: 2021-11-02 | End: 2022-04-04 | Stop reason: SDUPTHER

## 2021-11-02 NOTE — TELEPHONE ENCOUNTER
----- Message from Norberto Keita sent at 11/2/2021  1:35 PM EDT -----  Regarding: Bariatic Process Requirements  Neymar,    Please at your convenience provide clearance note in my charts for procedure based upon the results from my Nuclear / stress test from Sept. 10 2021.    Also please see attached doc for aspirin / blood thinner prior to procedure.    Thank you greatly for your concern and help in this process.  Please let me know if you have any questions and or concerns.    Norberto Keita  [phone]

## 2021-11-03 ENCOUNTER — HOSPITAL ENCOUNTER (OUTPATIENT)
Dept: GENERAL RADIOLOGY | Facility: HOSPITAL | Age: 46
Discharge: HOME | End: 2021-11-03

## 2021-11-03 ENCOUNTER — LAB (OUTPATIENT)
Dept: LAB | Facility: HOSPITAL | Age: 46
End: 2021-11-03

## 2021-11-03 DIAGNOSIS — E66.01 CLASS 3 SEVERE OBESITY WITH SERIOUS COMORBIDITY AND BODY MASS INDEX (BMI) OF 45.0 TO 49.9 IN ADULT, UNSPECIFIED OBESITY TYPE (HCC): ICD-10-CM

## 2021-11-03 DIAGNOSIS — N52.9 ERECTILE DYSFUNCTION, UNSPECIFIED ERECTILE DYSFUNCTION TYPE: ICD-10-CM

## 2021-11-03 DIAGNOSIS — I10 HYPERTENSION, ESSENTIAL: ICD-10-CM

## 2021-11-03 DIAGNOSIS — I50.33 ACUTE ON CHRONIC DIASTOLIC CONGESTIVE HEART FAILURE (HCC): ICD-10-CM

## 2021-11-03 DIAGNOSIS — R73.03 PREDIABETES: ICD-10-CM

## 2021-11-03 DIAGNOSIS — F41.1 GENERALIZED ANXIETY DISORDER: ICD-10-CM

## 2021-11-03 DIAGNOSIS — G47.33 OBSTRUCTIVE SLEEP APNEA: ICD-10-CM

## 2021-11-03 DIAGNOSIS — D72.0: ICD-10-CM

## 2021-11-03 PROCEDURE — 84443 ASSAY THYROID STIM HORMONE: CPT

## 2021-11-03 PROCEDURE — 85007 BL SMEAR W/DIFF WBC COUNT: CPT | Performed by: PHYSICIAN ASSISTANT

## 2021-11-03 PROCEDURE — 71046 X-RAY EXAM CHEST 2 VIEWS: CPT

## 2021-11-03 PROCEDURE — 36415 COLL VENOUS BLD VENIPUNCTURE: CPT

## 2021-11-03 PROCEDURE — 85027 COMPLETE CBC AUTOMATED: CPT | Performed by: PHYSICIAN ASSISTANT

## 2021-11-03 NOTE — PROGRESS NOTES
CALLED PATIENT AND ADVISED OF STUDY RESULTS. PATIENT VERBALIZED UNDERSTANDING AND WAS AGREEABLE TO BIPAP THERAPY. FAXED ORDER TO MICHAEL 11/02/21 [facility]

## 2021-11-04 DIAGNOSIS — R71.8 POLYCHROMASIA: Primary | ICD-10-CM

## 2021-11-04 LAB
LAB AP CASE REPORT: NORMAL
LAB AP CLINICAL INFORMATION: NORMAL
PATH REPORT.FINAL DX SPEC: NORMAL
PATH REPORT.GROSS SPEC: NORMAL
PATHOLOGY REVIEW: YES
TSH SERPL DL<=0.05 MIU/L-ACNC: 1.77 UIU/ML (ref 0.27–4.2)

## 2021-11-08 ENCOUNTER — TELEPHONE (OUTPATIENT)
Dept: BARIATRICS/WEIGHT MGMT | Facility: CLINIC | Age: 46
End: 2021-11-08

## 2021-11-08 ENCOUNTER — TELEPHONE (OUTPATIENT)
Dept: CARDIOLOGY | Facility: CLINIC | Age: 46
End: 2021-11-08

## 2021-11-12 ENCOUNTER — HOSPITAL ENCOUNTER (OUTPATIENT)
Facility: HOSPITAL | Age: 46
Setting detail: HOSPITAL OUTPATIENT SURGERY
End: 2021-11-12
Attending: SURGERY | Admitting: SURGERY

## 2021-11-12 PROBLEM — N52.9 ERECTILE DYSFUNCTION: Status: ACTIVE | Noted: 2021-11-12

## 2021-11-15 DIAGNOSIS — I50.33 ACUTE ON CHRONIC DIASTOLIC CONGESTIVE HEART FAILURE (HCC): ICD-10-CM

## 2021-11-15 DIAGNOSIS — I10 HYPERTENSION, ESSENTIAL: ICD-10-CM

## 2021-11-15 DIAGNOSIS — M1A.0710 IDIOPATHIC CHRONIC GOUT OF RIGHT FOOT WITHOUT TOPHUS: ICD-10-CM

## 2021-11-15 DIAGNOSIS — K21.9 GASTROESOPHAGEAL REFLUX DISEASE, UNSPECIFIED WHETHER ESOPHAGITIS PRESENT: ICD-10-CM

## 2021-11-15 RX ORDER — ALLOPURINOL 100 MG/1
TABLET ORAL
Qty: 90 TABLET | Refills: 0 | Status: SHIPPED | OUTPATIENT
Start: 2021-11-15 | End: 2022-02-18 | Stop reason: SDUPTHER

## 2021-11-15 RX ORDER — PANTOPRAZOLE SODIUM 40 MG/1
TABLET, DELAYED RELEASE ORAL
Qty: 90 TABLET | Refills: 0 | Status: SHIPPED | OUTPATIENT
Start: 2021-11-15 | End: 2022-02-18

## 2021-11-15 RX ORDER — CARVEDILOL 12.5 MG/1
TABLET ORAL
Qty: 180 TABLET | Refills: 0 | Status: SHIPPED | OUTPATIENT
Start: 2021-11-15 | End: 2022-02-18

## 2021-11-15 RX ORDER — BUMETANIDE 1 MG/1
TABLET ORAL
Qty: 90 TABLET | Refills: 0 | Status: SHIPPED | OUTPATIENT
Start: 2021-11-15 | End: 2022-02-18

## 2021-11-30 ENCOUNTER — TELEPHONE (OUTPATIENT)
Dept: BARIATRICS/WEIGHT MGMT | Facility: CLINIC | Age: 46
End: 2021-11-30

## 2021-12-01 DIAGNOSIS — K21.9 GASTROESOPHAGEAL REFLUX DISEASE, UNSPECIFIED WHETHER ESOPHAGITIS PRESENT: ICD-10-CM

## 2021-12-01 DIAGNOSIS — E66.01 CLASS 3 SEVERE OBESITY WITH SERIOUS COMORBIDITY AND BODY MASS INDEX (BMI) OF 45.0 TO 49.9 IN ADULT, UNSPECIFIED OBESITY TYPE (HCC): Primary | ICD-10-CM

## 2021-12-06 NOTE — PROGRESS NOTES
DATE OF CONSULTATION: 12/7/2021    REFERRING PHYSICIAN: Cam Cheung MD    Dear Cam Soto MD  Thank you for asking for my medical advice on this patient. I saw him in the  [place] office on 12/7/2021    REASON FOR CONSULTATION: Polycythemia    HISTORY OF PRESENT ILLNESS: The patient is a very pleasant 46 y.o.  male   who was in his usual state of health until July 2021.  The patient had blood work done that revealed polycythemia.  This has been repeated again that revealed the same abnormality.  He was referred to me for further recommendations.    SUBJECTIVE: When I saw the patient today he is here by himself. He has been diagnosed with severe sleep apnea as well as diastolic heart failure. Currently he is on BiPAP which she has been wearing for the last 2 months. He has been feeling significantly better. He was on testosterone replacement long time ago but stopped it more than 5 years ago. Denied any active bleeding.    Review of Systems   Constitutional: Negative for activity change, appetite change, chills, fatigue, fever and unexpected weight change.   HENT: Negative for hearing loss, mouth sores, nosebleeds, sore throat and trouble swallowing.    Eyes: Negative for visual disturbance.   Respiratory: Negative for cough, chest tightness, shortness of breath and wheezing.    Cardiovascular: Negative for chest pain, palpitations and leg swelling.   Gastrointestinal: Negative for abdominal distention, abdominal pain, blood in stool, constipation, diarrhea, nausea, rectal pain and vomiting.   Endocrine: Negative for cold intolerance and heat intolerance.   Genitourinary: Negative for difficulty urinating, dysuria, frequency and urgency.   Musculoskeletal: Negative for arthralgias, back pain, gait problem, joint swelling and myalgias.   Skin: Negative for rash.   Neurological: Negative for dizziness, tremors, syncope, weakness, light-headedness, numbness and headaches.   Hematological: Negative for  adenopathy. Does not bruise/bleed easily.   Psychiatric/Behavioral: Negative for confusion, sleep disturbance and suicidal ideas. The patient is not nervous/anxious.        Past Medical History:   Diagnosis Date   • CHF (congestive heart failure), NYHA class I, chronic, diastolic (HCC)    • Heart failure (HCC)    • HTN (hypertension)    • Hyperlipidemia    • Sacroiliitis (HCC)        Social History     Socioeconomic History   • Marital status:    Tobacco Use   • Smoking status: Never Smoker   • Smokeless tobacco: Never Used   Vaping Use   • Vaping Use: Never used   Substance and Sexual Activity   • Alcohol use: Yes     Alcohol/week: 1.0 - 2.0 standard drink     Types: 1 - 2 Standard drinks or equivalent per week     Comment: 1-2   • Drug use: No   • Sexual activity: Defer       Family History   Problem Relation Age of Onset   • Diabetes Mother    • Hyperlipidemia Mother    • Hypertension Mother    • Coronary artery disease Father    • Hyperlipidemia Father    • Hypertension Father    • Heart disease Father    • Heart failure Father    • Deep vein thrombosis Father        Past Surgical History:   Procedure Laterality Date   • HYDROCELE EXCISION / REPAIR  2009     Surgery Tunica Vaginalis Excision of Hydrocele   • KNEE SURGERY Left 1992   • VASECTOMY  2009   • VASECTOMY REVERSAL  2018    failed       Allergies   Allergen Reactions   • Lisinopril Cough          Current Outpatient Medications:   •  allopurinol (ZYLOPRIM) 100 MG tablet, TAKE ONE TABLET BY MOUTH EVERY DAY, Disp: 90 tablet, Rfl: 0  •  aspirin 81 MG chewable tablet, Chew 1 tablet Daily., Disp: 30 tablet, Rfl: 0  •  atorvastatin (Lipitor) 40 MG tablet, Take 1 tablet by mouth Every Night., Disp: 90 tablet, Rfl: 0  •  bumetanide (BUMEX) 1 MG tablet, TAKE ONE TABLET BY MOUTH EVERY DAY, Disp: 90 tablet, Rfl: 0  •  carvedilol (COREG) 12.5 MG tablet, TAKE ONE TABLET BY MOUTH EVERY 12 HOURS, Disp: 180 tablet, Rfl: 0  •  colchicine 0.6 MG tablet, Take 1  "tablet by mouth Daily., Disp: 90 tablet, Rfl: 0  •  diazePAM (VALIUM) 10 MG tablet, , Disp: , Rfl:   •  losartan (Cozaar) 100 MG tablet, Take 1 tablet by mouth Daily., Disp: 90 tablet, Rfl: 1  •  pantoprazole (PROTONIX) 40 MG EC tablet, TAKE ONE TABLET BY MOUTH EVERY DAY, Disp: 90 tablet, Rfl: 0  •  tadalafil (Cialis) 20 MG tablet, 1/2-1 PO as needed prior to activity, Disp: 30 tablet, Rfl: 5    PHYSICAL EXAMINATION:   Ht 185.4 cm (72.99\")   BMI 48.61 kg/m²   There were no vitals filed for this visit.                ECOG Performance Status: 1 - Symptomatic but completely ambulatory  General Appearance:  alert, cooperative, no apparent distress and appears stated age   Neurologic/Psychiatric: A&O x 3, gait steady, appropriate affect, strength 5/5 in all muscle groups   HEENT:  Normocephalic, without obvious abnormality, mucous membranes moist   Neck: Supple, symmetrical, trachea midline, no adenopathy;  No thyromegaly, masses, or tenderness   Lungs:   Clear to auscultation bilaterally; respirations regular, even, and unlabored bilaterally   Heart:  Regular rate and rhythm, no murmurs appreciated   Abdomen:   Soft, non-tender, non-distended and no organomegaly   Lymph nodes: No cervical, supraclavicular, inguinal or axillary adenopathy noted   Extremities: Normal, atraumatic; no clubbing, cyanosis, or edema    Skin: No rashes, ulcers, or suspicious lesions noted       No visits with results within 2 Week(s) from this visit.   Latest known visit with results is:   Lab on 11/03/2021   Component Date Value Ref Range Status   • TSH 11/03/2021 1.770  0.270 - 4.200 uIU/mL Final   • Pathology Review 11/03/2021 Yes   Final   • Final Diagnosis 11/03/2021    Final                    Value:This result contains rich text formatting which cannot be displayed here.   • Clinical Information 11/03/2021    Final                    Value:This result contains rich text formatting which cannot be displayed here.   • Gross Description " 11/03/2021    Final                    Value:This result contains rich text formatting which cannot be displayed here.   • Case Report 11/03/2021    Final                    Value:Surgical Pathology Report                         Case: [id number]                                  Authorizing Provider:  Naresh Joyner PA   Collected:           11/03/2021 09:39 AM          Ordering Location:     [facility]   Received:            11/04/2021 01:06 AM                                 [facility]                                                                                [facility]                                                             Pathologist:           Aleisha Cavanaugh MD                                                          Specimen:    Arm                                                                                            No results found.      DIAGNOSTIC DATA:   1.  Patient's medical records including doctor's notes blood work results and imaging report reviewed by me and document the patient's chart.    ASSESSMENT: The patient is a very pleasant 46 y.o.  male  with polycythemia    PROBLEM LIST:   1. Secondary polycythemia:  A. Incidentally noted during blood work done July 2021  B. Induced by morbid obesity with obstructive sleep apnea  2.  Obesity  3.  Hypertension  4.  Anxiety  5.  Hypercholesteremia  6.  Obstructive sleep apnea:  A. Associated with diastolic heart failure  B. Currently patient is on BiPAP    PLAN:   1. I had a long discussion today with the patient and his wife about his  new diagnosis of polycythemia. I reviewed the patient's documents including refereing provider's notes, lab results, imaging report.   2.  I explained to the patient that his polycythemia was induced by obstructive sleep apnea.  Since patient has been compliant with his BiPAP his hemoglobin hematocrit has been corrected.  3.  The patient was advised to exercise and lose  weight.  4.  The patient has stopped taking aspirin 81 mg daily.  5.  He will follow-up with me in the future on as-needed basis.      Kaity Watts MD  12/7/2021

## 2021-12-07 ENCOUNTER — CONSULT (OUTPATIENT)
Dept: ONCOLOGY | Facility: CLINIC | Age: 46
End: 2021-12-07

## 2021-12-07 VITALS
HEART RATE: 88 BPM | DIASTOLIC BLOOD PRESSURE: 119 MMHG | BODY MASS INDEX: 41.75 KG/M2 | WEIGHT: 315 LBS | OXYGEN SATURATION: 95 % | TEMPERATURE: 96.8 F | SYSTOLIC BLOOD PRESSURE: 171 MMHG | HEIGHT: 73 IN | RESPIRATION RATE: 18 BRPM

## 2021-12-07 DIAGNOSIS — Z01.818 PREOPERATIVE TESTING: Primary | ICD-10-CM

## 2021-12-07 DIAGNOSIS — D75.1 POLYCYTHEMIA, SECONDARY: Primary | ICD-10-CM

## 2021-12-07 PROCEDURE — 99204 OFFICE O/P NEW MOD 45 MIN: CPT | Performed by: INTERNAL MEDICINE

## 2021-12-10 ENCOUNTER — PATIENT ROUNDING (BHMG ONLY) (OUTPATIENT)
Dept: ONCOLOGY | Facility: CLINIC | Age: 46
End: 2021-12-10

## 2021-12-10 NOTE — PROGRESS NOTES
A My-Chart message has been sent to the patient for PATIENT ROUNDING with [facility].

## 2021-12-13 DIAGNOSIS — E78.00 HYPERCHOLESTEROLEMIA: ICD-10-CM

## 2021-12-13 RX ORDER — ATORVASTATIN CALCIUM 40 MG/1
40 TABLET, FILM COATED ORAL NIGHTLY
Qty: 90 TABLET | Refills: 0 | Status: SHIPPED | OUTPATIENT
Start: 2021-12-13 | End: 2022-03-09 | Stop reason: SDUPTHER

## 2021-12-28 ENCOUNTER — APPOINTMENT (OUTPATIENT)
Dept: PREADMISSION TESTING | Facility: HOSPITAL | Age: 46
End: 2021-12-28

## 2021-12-28 LAB — SARS-COV-2 RNA PNL SPEC NAA+PROBE: NOT DETECTED

## 2021-12-28 PROCEDURE — U0004 COV-19 TEST NON-CDC HGH THRU: HCPCS

## 2021-12-28 PROCEDURE — C9803 HOPD COVID-19 SPEC COLLECT: HCPCS

## 2021-12-30 ENCOUNTER — HOSPITAL ENCOUNTER (OUTPATIENT)
Dept: GENERAL RADIOLOGY | Facility: HOSPITAL | Age: 46
Discharge: HOME | End: 2021-12-30
Admitting: NURSE PRACTITIONER

## 2021-12-30 DIAGNOSIS — K21.9 GASTROESOPHAGEAL REFLUX DISEASE, UNSPECIFIED WHETHER ESOPHAGITIS PRESENT: ICD-10-CM

## 2021-12-30 PROCEDURE — 74240 X-RAY XM UPR GI TRC 1CNTRST: CPT

## 2021-12-30 RX ADMIN — BARIUM SULFATE 135 ML: 980 POWDER, FOR SUSPENSION ORAL at 10:27

## 2021-12-30 RX ADMIN — BARIUM SULFATE 183 ML: 960 POWDER, FOR SUSPENSION ORAL at 10:27

## 2021-12-30 RX ADMIN — ANTACID/ANTIFLATULENT 1 PACKET: 380; 550; 10; 10 GRANULE, EFFERVESCENT ORAL at 10:27

## 2021-12-30 RX ADMIN — BARIUM SULFATE 700 MG: 700 TABLET ORAL at 10:27

## 2022-01-03 ENCOUNTER — TELEPHONE (OUTPATIENT)
Dept: BARIATRICS/WEIGHT MGMT | Facility: CLINIC | Age: 47
End: 2022-01-03

## 2022-01-03 NOTE — TELEPHONE ENCOUNTER
I called Mr espinosa to let him know about his  fluoroscopy upper gastrointestinal test  results.         ----- Message from MICHELLE Ball sent at 12/30/2021  1:40 PM EST -----  AS

## 2022-02-14 DIAGNOSIS — I10 HYPERTENSION, ESSENTIAL: ICD-10-CM

## 2022-02-14 DIAGNOSIS — I50.33 ACUTE ON CHRONIC DIASTOLIC CONGESTIVE HEART FAILURE: ICD-10-CM

## 2022-02-14 DIAGNOSIS — K21.9 GASTROESOPHAGEAL REFLUX DISEASE, UNSPECIFIED WHETHER ESOPHAGITIS PRESENT: ICD-10-CM

## 2022-02-14 DIAGNOSIS — M1A.0710 IDIOPATHIC CHRONIC GOUT OF RIGHT FOOT WITHOUT TOPHUS: ICD-10-CM

## 2022-02-14 RX ORDER — CARVEDILOL 12.5 MG/1
TABLET ORAL
Qty: 60 TABLET | Refills: 0 | OUTPATIENT
Start: 2022-02-14

## 2022-02-14 RX ORDER — PANTOPRAZOLE SODIUM 40 MG/1
TABLET, DELAYED RELEASE ORAL
Qty: 30 TABLET | Refills: 0 | OUTPATIENT
Start: 2022-02-14

## 2022-02-14 RX ORDER — ALLOPURINOL 100 MG/1
TABLET ORAL
Qty: 30 TABLET | Refills: 0 | OUTPATIENT
Start: 2022-02-14

## 2022-02-14 RX ORDER — BUMETANIDE 1 MG/1
TABLET ORAL
Qty: 30 TABLET | Refills: 0 | OUTPATIENT
Start: 2022-02-14

## 2022-02-17 ENCOUNTER — PREP FOR SURGERY (OUTPATIENT)
Dept: OTHER | Facility: HOSPITAL | Age: 47
End: 2022-02-17

## 2022-02-17 DIAGNOSIS — K21.9 GASTROESOPHAGEAL REFLUX DISEASE, UNSPECIFIED WHETHER ESOPHAGITIS PRESENT: ICD-10-CM

## 2022-02-17 DIAGNOSIS — M1A.0710 IDIOPATHIC CHRONIC GOUT OF RIGHT FOOT WITHOUT TOPHUS: ICD-10-CM

## 2022-02-17 DIAGNOSIS — I50.33 ACUTE ON CHRONIC DIASTOLIC CONGESTIVE HEART FAILURE: ICD-10-CM

## 2022-02-17 DIAGNOSIS — I10 HYPERTENSION, ESSENTIAL: ICD-10-CM

## 2022-02-17 DIAGNOSIS — E66.01 OBESITY, CLASS III, BMI 40-49.9 (MORBID OBESITY): Primary | ICD-10-CM

## 2022-02-17 PROBLEM — E66.813 OBESITY, CLASS III, BMI 40-49.9 (MORBID OBESITY): Status: ACTIVE | Noted: 2022-02-17

## 2022-02-17 RX ORDER — SODIUM CHLORIDE 0.9 % (FLUSH) 0.9 %
3 SYRINGE (ML) INJECTION EVERY 12 HOURS SCHEDULED
Status: CANCELLED | OUTPATIENT
Start: 2022-02-17

## 2022-02-17 RX ORDER — GABAPENTIN 250 MG/5ML
300 SOLUTION ORAL ONCE
Status: CANCELLED | OUTPATIENT
Start: 2022-04-13 | End: 2022-02-17

## 2022-02-17 RX ORDER — PANTOPRAZOLE SODIUM 40 MG/10ML
40 INJECTION, POWDER, LYOPHILIZED, FOR SOLUTION INTRAVENOUS ONCE
Status: CANCELLED | OUTPATIENT
Start: 2022-04-13 | End: 2022-02-17

## 2022-02-17 RX ORDER — CHLORHEXIDINE GLUCONATE 0.12 MG/ML
15 RINSE ORAL SEE ADMIN INSTRUCTIONS
Status: CANCELLED | OUTPATIENT
Start: 2022-04-13

## 2022-02-17 RX ORDER — SODIUM CHLORIDE, SODIUM LACTATE, POTASSIUM CHLORIDE, CALCIUM CHLORIDE 600; 310; 30; 20 MG/100ML; MG/100ML; MG/100ML; MG/100ML
100 INJECTION, SOLUTION INTRAVENOUS CONTINUOUS
Status: CANCELLED | OUTPATIENT
Start: 2022-04-13

## 2022-02-17 RX ORDER — ACETAMINOPHEN 160 MG/5ML
975 SOLUTION ORAL ONCE
Status: CANCELLED | OUTPATIENT
Start: 2022-04-13 | End: 2022-02-17

## 2022-02-17 RX ORDER — SODIUM CHLORIDE 0.9 % (FLUSH) 0.9 %
3-10 SYRINGE (ML) INJECTION AS NEEDED
Status: CANCELLED | OUTPATIENT
Start: 2022-04-13

## 2022-02-17 RX ORDER — CEFAZOLIN SODIUM IN 0.9 % NACL 3 G/100 ML
3 INTRAVENOUS SOLUTION, PIGGYBACK (ML) INTRAVENOUS
Status: CANCELLED | OUTPATIENT
Start: 2022-04-13

## 2022-02-17 RX ORDER — METOCLOPRAMIDE HYDROCHLORIDE 5 MG/ML
10 INJECTION INTRAMUSCULAR; INTRAVENOUS ONCE
Status: CANCELLED | OUTPATIENT
Start: 2022-04-13 | End: 2022-02-17

## 2022-02-17 RX ORDER — SCOLOPAMINE TRANSDERMAL SYSTEM 1 MG/1
1 PATCH, EXTENDED RELEASE TRANSDERMAL CONTINUOUS
Status: CANCELLED | OUTPATIENT
Start: 2022-04-13 | End: 2022-04-16

## 2022-02-17 RX ORDER — PROMETHAZINE HYDROCHLORIDE 25 MG/1
25 SUPPOSITORY RECTAL ONCE AS NEEDED
Status: CANCELLED | OUTPATIENT
Start: 2022-04-13

## 2022-02-17 RX ORDER — PROMETHAZINE HYDROCHLORIDE 12.5 MG/1
12.5 TABLET ORAL ONCE AS NEEDED
Status: CANCELLED | OUTPATIENT
Start: 2022-04-13

## 2022-02-18 DIAGNOSIS — M1A.0710 IDIOPATHIC CHRONIC GOUT OF RIGHT FOOT WITHOUT TOPHUS: ICD-10-CM

## 2022-02-18 RX ORDER — ALLOPURINOL 100 MG/1
100 TABLET ORAL DAILY
Qty: 90 TABLET | Refills: 0 | Status: SHIPPED | OUTPATIENT
Start: 2022-02-18 | End: 2022-03-31 | Stop reason: SDUPTHER

## 2022-02-18 RX ORDER — CARVEDILOL 12.5 MG/1
12.5 TABLET ORAL EVERY 12 HOURS
Qty: 28 TABLET | Refills: 0 | Status: SHIPPED | OUTPATIENT
Start: 2022-02-18 | End: 2022-03-09 | Stop reason: SDUPTHER

## 2022-02-18 RX ORDER — BUMETANIDE 1 MG/1
1 TABLET ORAL DAILY
Qty: 14 TABLET | Refills: 0 | Status: SHIPPED | OUTPATIENT
Start: 2022-02-18 | End: 2022-03-09 | Stop reason: SDUPTHER

## 2022-02-18 RX ORDER — ALLOPURINOL 100 MG/1
100 TABLET ORAL DAILY
Qty: 14 TABLET | Refills: 0 | Status: SHIPPED | OUTPATIENT
Start: 2022-02-18 | End: 2022-07-11

## 2022-02-18 RX ORDER — PANTOPRAZOLE SODIUM 40 MG/1
40 TABLET, DELAYED RELEASE ORAL DAILY
Qty: 14 TABLET | Refills: 0 | Status: SHIPPED | OUTPATIENT
Start: 2022-02-18 | End: 2022-03-09 | Stop reason: SDUPTHER

## 2022-02-21 DIAGNOSIS — I10 HYPERTENSION, ESSENTIAL: ICD-10-CM

## 2022-02-21 DIAGNOSIS — K21.9 GASTROESOPHAGEAL REFLUX DISEASE, UNSPECIFIED WHETHER ESOPHAGITIS PRESENT: ICD-10-CM

## 2022-02-21 DIAGNOSIS — E78.00 HYPERCHOLESTEROLEMIA: ICD-10-CM

## 2022-02-21 RX ORDER — LOSARTAN POTASSIUM 100 MG/1
100 TABLET ORAL DAILY
Qty: 90 TABLET | Refills: 0 | Status: SHIPPED | OUTPATIENT
Start: 2022-02-21 | End: 2022-04-25

## 2022-03-09 ENCOUNTER — TELEMEDICINE (OUTPATIENT)
Dept: FAMILY MEDICINE CLINIC | Facility: CLINIC | Age: 47
End: 2022-03-09

## 2022-03-09 DIAGNOSIS — I10 HYPERTENSION, ESSENTIAL: ICD-10-CM

## 2022-03-09 DIAGNOSIS — N52.9 ERECTILE DYSFUNCTION, UNSPECIFIED ERECTILE DYSFUNCTION TYPE: ICD-10-CM

## 2022-03-09 DIAGNOSIS — I50.33 ACUTE ON CHRONIC DIASTOLIC CONGESTIVE HEART FAILURE: ICD-10-CM

## 2022-03-09 DIAGNOSIS — K21.9 GASTROESOPHAGEAL REFLUX DISEASE, UNSPECIFIED WHETHER ESOPHAGITIS PRESENT: ICD-10-CM

## 2022-03-09 DIAGNOSIS — E78.00 HYPERCHOLESTEROLEMIA: ICD-10-CM

## 2022-03-09 DIAGNOSIS — E11.9 TYPE 2 DIABETES MELLITUS WITHOUT COMPLICATION, WITHOUT LONG-TERM CURRENT USE OF INSULIN: Primary | ICD-10-CM

## 2022-03-09 PROCEDURE — 99214 OFFICE O/P EST MOD 30 MIN: CPT | Performed by: PHYSICIAN ASSISTANT

## 2022-03-09 RX ORDER — TADALAFIL 20 MG/1
TABLET ORAL
Qty: 30 TABLET | Refills: 5 | Status: SHIPPED | OUTPATIENT
Start: 2022-03-09 | End: 2022-03-09 | Stop reason: SDUPTHER

## 2022-03-09 RX ORDER — PANTOPRAZOLE SODIUM 40 MG/1
40 TABLET, DELAYED RELEASE ORAL DAILY
Qty: 90 TABLET | Refills: 0 | Status: SHIPPED | OUTPATIENT
Start: 2022-03-09 | End: 2022-03-25 | Stop reason: SDUPTHER

## 2022-03-09 RX ORDER — CARVEDILOL 12.5 MG/1
12.5 TABLET ORAL EVERY 12 HOURS
Qty: 180 TABLET | Refills: 0 | Status: SHIPPED | OUTPATIENT
Start: 2022-03-09 | End: 2022-03-25 | Stop reason: SDUPTHER

## 2022-03-09 RX ORDER — ATORVASTATIN CALCIUM 40 MG/1
40 TABLET, FILM COATED ORAL NIGHTLY
Qty: 90 TABLET | Refills: 0 | Status: SHIPPED | OUTPATIENT
Start: 2022-03-09 | End: 2022-03-25 | Stop reason: SDUPTHER

## 2022-03-09 RX ORDER — TADALAFIL 20 MG/1
TABLET ORAL
Qty: 30 TABLET | Refills: 5 | Status: SHIPPED | OUTPATIENT
Start: 2022-03-09

## 2022-03-09 RX ORDER — BUMETANIDE 1 MG/1
1 TABLET ORAL DAILY
Qty: 90 TABLET | Refills: 0 | Status: SHIPPED | OUTPATIENT
Start: 2022-03-09 | End: 2022-03-25 | Stop reason: SDUPTHER

## 2022-03-09 NOTE — PROGRESS NOTES
Subjective   Norberto Keita is a 46 y.o. male.   You have chosen to receive care through a telehealth visit.  Do you consent to use a video/audio connection for your medical care today? Yes    History of Present Illness   Pt presents for follow up for chronic medical conditions. DM type 2. Managing with lifestyle changes. Has upcoming gastric sleeve surgery   Needs refills on medications for HTN, Hyperlipidemia, and GERD.   Excited for weight loss surgery. Hoping to come off of medications in the future if well regulated after weight loss.     The following portions of the patient's history were reviewed and updated as appropriate: allergies, current medications and problem list.    Review of Systems   Constitutional: Negative for chills and fever.   Respiratory: Negative for cough, shortness of breath and wheezing.    Cardiovascular: Negative for chest pain.   Gastrointestinal: Negative for nausea and vomiting.       Objective   Physical Exam  Constitutional:       Appearance: Normal appearance.   Pulmonary:      Effort: Pulmonary effort is normal. No respiratory distress.   Skin:     General: Skin is warm and dry.   Neurological:      Mental Status: He is alert and oriented to person, place, and time.   Psychiatric:         Mood and Affect: Mood normal.         Behavior: Behavior normal.         Thought Content: Thought content normal.         Judgment: Judgment normal.         Assessment/Plan   Diagnoses and all orders for this visit:    1. Type 2 diabetes mellitus without complication, without long-term current use of insulin (HCC) (Primary)  -     POC Glycosylated Hemoglobin (Hb A1C)    2. Hypercholesterolemia  -     atorvastatin (Lipitor) 40 MG tablet; Take 1 tablet by mouth Every Night.  Dispense: 90 tablet; Refill: 0    3. Gastroesophageal reflux disease, unspecified whether esophagitis present  -     pantoprazole (PROTONIX) 40 MG EC tablet; Take 1 tablet by mouth Daily.  Dispense: 90 tablet; Refill:  0    4. Hypertension, essential  -     carvedilol (COREG) 12.5 MG tablet; Take 1 tablet by mouth Every 12 (Twelve) Hours.  Dispense: 180 tablet; Refill: 0    5. Acute on chronic diastolic congestive heart failure (HCC)  -     bumetanide (BUMEX) 1 MG tablet; Take 1 tablet by mouth Daily.  Dispense: 90 tablet; Refill: 0    6. Erectile dysfunction, unspecified erectile dysfunction type  -     tadalafil (Cialis) 20 MG tablet; 1/2-1 PO as needed prior to activity  Dispense: 30 tablet; Refill: 5    refills on requested medications sent to pharmacy. Pt will come in for POC A1C check this week to see how DM is controlled. Will follow up with patient again after surgery to recheck full lab panels. Pt directed to monitor BP closely after surgery. If dropping to low, call office for directions on dose adjustments.       This visit has been rescheduled as a video visit to comply with patient safety concerns in accordance with CDC recommendations. Total time of discussion was 10 minutes.

## 2022-03-25 DIAGNOSIS — I10 HYPERTENSION, ESSENTIAL: ICD-10-CM

## 2022-03-25 DIAGNOSIS — E78.00 HYPERCHOLESTEROLEMIA: ICD-10-CM

## 2022-03-25 DIAGNOSIS — I50.33 ACUTE ON CHRONIC DIASTOLIC CONGESTIVE HEART FAILURE: ICD-10-CM

## 2022-03-25 DIAGNOSIS — K21.9 GASTROESOPHAGEAL REFLUX DISEASE, UNSPECIFIED WHETHER ESOPHAGITIS PRESENT: ICD-10-CM

## 2022-03-25 RX ORDER — BUMETANIDE 1 MG/1
1 TABLET ORAL DAILY
Qty: 90 TABLET | Refills: 3 | Status: ON HOLD | OUTPATIENT
Start: 2022-03-25 | End: 2022-04-14 | Stop reason: SDUPTHER

## 2022-03-25 RX ORDER — CARVEDILOL 12.5 MG/1
12.5 TABLET ORAL EVERY 12 HOURS
Qty: 180 TABLET | Refills: 3 | Status: SHIPPED | OUTPATIENT
Start: 2022-03-25

## 2022-03-25 RX ORDER — PANTOPRAZOLE SODIUM 40 MG/1
40 TABLET, DELAYED RELEASE ORAL DAILY
Qty: 90 TABLET | Refills: 3 | Status: SHIPPED | OUTPATIENT
Start: 2022-03-25 | End: 2022-03-31

## 2022-03-25 RX ORDER — ATORVASTATIN CALCIUM 40 MG/1
40 TABLET, FILM COATED ORAL NIGHTLY
Qty: 90 TABLET | Refills: 3 | Status: SHIPPED | OUTPATIENT
Start: 2022-03-25 | End: 2022-04-25

## 2022-03-31 ENCOUNTER — LAB (OUTPATIENT)
Dept: LAB | Facility: HOSPITAL | Age: 47
End: 2022-03-31

## 2022-03-31 ENCOUNTER — CONSULT (OUTPATIENT)
Dept: BARIATRICS/WEIGHT MGMT | Facility: CLINIC | Age: 47
End: 2022-03-31

## 2022-03-31 VITALS
DIASTOLIC BLOOD PRESSURE: 107 MMHG | HEART RATE: 80 BPM | HEIGHT: 73 IN | RESPIRATION RATE: 18 BRPM | TEMPERATURE: 97.8 F | BODY MASS INDEX: 41.75 KG/M2 | SYSTOLIC BLOOD PRESSURE: 149 MMHG | WEIGHT: 315 LBS

## 2022-03-31 DIAGNOSIS — I50.33 ACUTE ON CHRONIC DIASTOLIC CONGESTIVE HEART FAILURE: ICD-10-CM

## 2022-03-31 DIAGNOSIS — I10 HYPERTENSION, ESSENTIAL: ICD-10-CM

## 2022-03-31 DIAGNOSIS — E66.01 OBESITY, CLASS III, BMI 40-49.9 (MORBID OBESITY): Primary | ICD-10-CM

## 2022-03-31 DIAGNOSIS — G47.33 OBSTRUCTIVE SLEEP APNEA: ICD-10-CM

## 2022-03-31 DIAGNOSIS — E66.01 OBESITY, CLASS III, BMI 40-49.9 (MORBID OBESITY): ICD-10-CM

## 2022-03-31 DIAGNOSIS — E78.2 MIXED HYPERLIPIDEMIA: ICD-10-CM

## 2022-03-31 DIAGNOSIS — K21.9 GASTROESOPHAGEAL REFLUX DISEASE, UNSPECIFIED WHETHER ESOPHAGITIS PRESENT: ICD-10-CM

## 2022-03-31 DIAGNOSIS — R73.03 PREDIABETES: ICD-10-CM

## 2022-03-31 PROBLEM — E66.813 CLASS 3 SEVERE OBESITY WITH BODY MASS INDEX (BMI) OF 45.0 TO 49.9 IN ADULT: Status: RESOLVED | Noted: 2019-06-12 | Resolved: 2022-03-31

## 2022-03-31 LAB
ALBUMIN SERPL-MCNC: 4.8 G/DL (ref 3.5–5.2)
ALBUMIN/GLOB SERPL: 1.8 G/DL
ALP SERPL-CCNC: 118 U/L (ref 39–117)
ALT SERPL W P-5'-P-CCNC: 60 U/L (ref 1–41)
ANION GAP SERPL CALCULATED.3IONS-SCNC: 11 MMOL/L (ref 5–15)
AST SERPL-CCNC: 31 U/L (ref 1–40)
BILIRUB SERPL-MCNC: 1.6 MG/DL (ref 0–1.2)
BUN SERPL-MCNC: 13 MG/DL (ref 6–20)
BUN/CREAT SERPL: 14 (ref 7–25)
CALCIUM SPEC-SCNC: 9.4 MG/DL (ref 8.6–10.5)
CHLORIDE SERPL-SCNC: 101 MMOL/L (ref 98–107)
CO2 SERPL-SCNC: 28 MMOL/L (ref 22–29)
CREAT SERPL-MCNC: 0.93 MG/DL (ref 0.76–1.27)
DEPRECATED RDW RBC AUTO: 40.1 FL (ref 37–54)
EGFRCR SERPLBLD CKD-EPI 2021: 103.2 ML/MIN/1.73
ERYTHROCYTE [DISTWIDTH] IN BLOOD BY AUTOMATED COUNT: 12.7 % (ref 12.3–15.4)
GLOBULIN UR ELPH-MCNC: 2.6 GM/DL
GLUCOSE SERPL-MCNC: 99 MG/DL (ref 65–99)
HCT VFR BLD AUTO: 45.9 % (ref 37.5–51)
HGB BLD-MCNC: 15.5 G/DL (ref 13–17.7)
MCH RBC QN AUTO: 29.4 PG (ref 26.6–33)
MCHC RBC AUTO-ENTMCNC: 33.8 G/DL (ref 31.5–35.7)
MCV RBC AUTO: 87.1 FL (ref 79–97)
PLATELET # BLD AUTO: 209 10*3/MM3 (ref 140–450)
PMV BLD AUTO: 10.5 FL (ref 6–12)
POTASSIUM SERPL-SCNC: 3.9 MMOL/L (ref 3.5–5.2)
PROT SERPL-MCNC: 7.4 G/DL (ref 6–8.5)
RBC # BLD AUTO: 5.27 10*6/MM3 (ref 4.14–5.8)
SODIUM SERPL-SCNC: 140 MMOL/L (ref 136–145)
WBC NRBC COR # BLD: 8.37 10*3/MM3 (ref 3.4–10.8)

## 2022-03-31 PROCEDURE — 85027 COMPLETE CBC AUTOMATED: CPT

## 2022-03-31 PROCEDURE — 36415 COLL VENOUS BLD VENIPUNCTURE: CPT

## 2022-03-31 PROCEDURE — 99215 OFFICE O/P EST HI 40 MIN: CPT | Performed by: SURGERY

## 2022-03-31 PROCEDURE — 80053 COMPREHEN METABOLIC PANEL: CPT

## 2022-03-31 RX ORDER — URSODIOL 300 MG/1
300 CAPSULE ORAL 2 TIMES DAILY
Qty: 60 CAPSULE | Refills: 5 | Status: SHIPPED | OUTPATIENT
Start: 2022-03-31 | End: 2022-03-31 | Stop reason: SDUPTHER

## 2022-03-31 RX ORDER — URSODIOL 300 MG/1
300 CAPSULE ORAL 2 TIMES DAILY
Qty: 60 CAPSULE | Refills: 5 | Status: SHIPPED | OUTPATIENT
Start: 2022-03-31

## 2022-03-31 NOTE — H&P
Bariatric Consult:  Referred by Cam Cheung MD    Norberto Keita is here today for consult on Consult (SLE[facility]E CONSULTATION)      History of Present Illness:     Norberto Keita is a 46 y.o. male with morbid obesity with co-morbidities including sleep apnea, hypertension, dyslipidemia, cardiovascular disease, back pain and knee pain who presents for surgical consultation for the above procedure. Norberto has completed the initial intake visit and has been examined by our nurse practitioner, dietician, psychologist and underwent the extensive educational teaching process under the guidance of our bariatric coordinator and myself. Norberto also has seen the educational video KASANDRA on the surgical procedure if available. Norberto attended today more educational teaching from our bariatric coordinator and myself. Norberto has had an extensive medical workup including a visit with their primary care physician, EKG, chest radiograph, blood work, EGD or UGI and possibly further testing. These have been reviewed by me and discussed with the patient. Norberto is now ready to proceed with surgery. Norberto presently denies nausea, vomiting, fever, chills, chest pain, shortness of air, melena, hematochezia, hemetemesis, dysuria, frequency, hematuria, jaundice or abdominal pain.       Past Medical History:   Diagnosis Date   • CHF (congestive heart failure), NYHA class I, chronic, diastolic (HCC)    • Heart failure (HCC)    • HTN (hypertension)    • Hyperlipidemia    • Sacroiliitis (HCC)        Encounter Diagnoses   Name Primary?   • Obesity, Class III, BMI 40-49.9 (morbid obesity) (HCC) Yes   • Hypertension, essential    • Mixed hyperlipidemia    • Acute on chronic diastolic congestive heart failure (HCC)    • Obstructive sleep apnea (bipap)    • Gastroesophageal reflux disease, unspecified whether esophagitis present    • Prediabetes        Past Surgical History:   Procedure Laterality Date   • HYDROCELE EXCISION / REPAIR  2009     Surgery  Tunica Vaginalis Excision of Hydrocele   • KNEE SURGERY Left 1992   • VASECTOMY  2009   • VASECTOMY REVERSAL  2018    failed       Patient Active Problem List   Diagnosis   • Gout of big toe   • Generalized anxiety disorder   • Hypertension, essential   • Reduced libido   • Hypogonadism in male   • Obstructive sleep apnea (bipap)   • Acute on chronic diastolic congestive heart failure (HCC)   • Mixed hyperlipidemia   • Prediabetes   • Dependent edema   • Polyuria   • GERD (gastroesophageal reflux disease)   • Aspiration pneumonia (HCC)   • Erectile dysfunction   • Polycythemia, secondary   • Obesity, Class III, BMI 40-49.9 (morbid obesity) (HCC)       Allergies   Allergen Reactions   • Lisinopril Cough         Current Outpatient Medications:   •  allopurinol (ZYLOPRIM) 100 MG tablet, Take 1 tablet by mouth Daily. LAST RF-APPT DUE, Disp: 14 tablet, Rfl: 0  •  atorvastatin (Lipitor) 40 MG tablet, Take 1 tablet by mouth Every Night., Disp: 90 tablet, Rfl: 3  •  bumetanide (BUMEX) 1 MG tablet, Take 1 tablet by mouth Daily., Disp: 90 tablet, Rfl: 3  •  carvedilol (COREG) 12.5 MG tablet, Take 1 tablet by mouth Every 12 (Twelve) Hours., Disp: 180 tablet, Rfl: 3  •  colchicine 0.6 MG tablet, Take 1 tablet by mouth Daily., Disp: 90 tablet, Rfl: 0  •  losartan (Cozaar) 100 MG tablet, Take 1 tablet by mouth Daily., Disp: 90 tablet, Rfl: 0  •  tadalafil (Cialis) 20 MG tablet, 1/2-1 PO as needed prior to activity, Disp: 30 tablet, Rfl: 5  •  enoxaparin (Lovenox) 40 MG/0.4ML solution syringe, Inject 0.4 mL under the skin into the appropriate area as directed Every 12 (Twelve) Hours for 14 days. Start after surgery unless instructed otherwise, Disp: 28 each, Rfl: 0  •  folic acid-vit B6-vit B12 (FOLBEE) 2.5-25-1 MG tablet tablet, Take 1 tablet by mouth Daily., Disp: 40 tablet, Rfl: 0  •  ursodiol (Actigall) 300 MG capsule, Take 1 capsule by mouth 2 (Two) Times a Day., Disp: 60 capsule, Rfl: 5    Social History     Socioeconomic  History   • Marital status:    Tobacco Use   • Smoking status: Never Smoker   • Smokeless tobacco: Never Used   Vaping Use   • Vaping Use: Never used   Substance and Sexual Activity   • Alcohol use: Yes     Alcohol/week: 1.0 - 2.0 standard drink     Types: 1 - 2 Standard drinks or equivalent per week     Comment: 1-2   • Drug use: No   • Sexual activity: Defer       Family History   Problem Relation Age of Onset   • Diabetes Mother    • Hyperlipidemia Mother    • Hypertension Mother    • Coronary artery disease Father    • Hyperlipidemia Father    • Hypertension Father    • Heart disease Father    • Heart failure Father    • Deep vein thrombosis Father        Review of Systems:  Review of Systems   Constitutional: Positive for fatigue.   Musculoskeletal: Positive for arthralgias and back pain.   All other systems reviewed and are negative.        Physical Exam:    Vital Signs:  Weight: (!) 164 kg (361 lb)   Body mass index is 47.74 kg/m².  Temp: 97.8 °F (36.6 °C)   Heart Rate: 80   BP: (!) 149/107       Physical Exam  Vitals reviewed.   HENT:      Head: Normocephalic and atraumatic.      Mouth/Throat:      Mouth: Mucous membranes are moist.      Pharynx: Oropharynx is clear.   Eyes:      General: No scleral icterus.     Extraocular Movements: Extraocular movements intact.      Conjunctiva/sclera: Conjunctivae normal.      Pupils: Pupils are equal, round, and reactive to light.   Neck:      Thyroid: No thyromegaly.   Cardiovascular:      Rate and Rhythm: Normal rate.   Pulmonary:      Effort: Pulmonary effort is normal. No respiratory distress.      Breath sounds: Normal breath sounds. No stridor. No wheezing or rhonchi.   Abdominal:      General: Bowel sounds are normal.      Palpations: Abdomen is soft.      Tenderness: There is no abdominal tenderness. There is no right CVA tenderness, left CVA tenderness, guarding or rebound.      Hernia: No hernia is present.   Musculoskeletal:         General: Normal  range of motion.      Cervical back: Normal range of motion and neck supple.   Lymphadenopathy:      Cervical: No cervical adenopathy.   Skin:     General: Skin is warm and dry.      Findings: No erythema.   Neurological:      Mental Status: He is alert and oriented to person, place, and time.   Psychiatric:         Mood and Affect: Mood normal.         Behavior: Behavior normal.         Thought Content: Thought content normal.         Judgment: Judgment normal.           Assessment:    Norberto Keita is a 46 y.o. year old male with medically complicated severe obesity with a BMI of Body mass index is 47.74 kg/m². and multiple co-morbidities listed in the encounter diagnosis.    I think he is an appropriate candidate for this surgery, and is ready to proceed.      Plan/Discussion/Summary:  No hiatal hernia per upper GI.  Patient does take PPI but asymptomatic.  No heartburn symptoms.    The patient has returned to the office for a surgical consultation and has requested to proceed with a laparoscopic gastric sleeve.  I have had the opportunity to obtain a history, examine the patient and review the patient's chart.    The patient understands that surgery is a tool and that weight loss is not guaranteed but only seen in the context of appropriate use, regular follow up, exercise and making appropriate food choices.     I personally discussed the potential complications of the laparoscopic gastric sleeve with this patient.  The patient is well aware of potential complications of the surgery that include but not limited to bleeding, infections, deep vein thrombosis, pulmonary embolism, pulmonary complications such as pneumonia, cardiac event, hernias, small bowel obstruction, damage to the spleen or other organs, bowel injury, disfiguring scars, failure to lose weight, need for additional surgery, conversion to an open procedure and death.  The patient is also aware of complications which apply in particular to the  gastric sleeve and can include but not limited to the leakage of gastric contents at the staple line, the development of an intra-abdominal abscess, gastroesophageal reflux disease, Oakley's esophagus, ulcers, vitamin/mineral deficiencies, strictures, and the possibility of converting this procedure to a Justin-en-Y gastric bypass. The patient also understands the possibility of requiring an acid reducer medication for the rest of their life.    The risks, benefits, potential complications and alternative therapies were discussed at great length as outlined in our extensive consent forms, online consent and educational teaching processes.    The patient has confirmed the participation in the programs extensive educational activities.    All questions and concerns were answered to patient's satisfaction.  The patient now wishes to proceed with surgery.     The patient has agreed to a postoperative course of anitcoagulant therapy.      I instructed patient to start on a H2 blocker or proton pump inhibitor if not already on one of these medications.    I explained in detail the procedures that we perform.  All of these procedures have a chance to convert to open if any technical challenges or complications do occur.  Bariatric surgery is not cosmetic surgery but rather a tool to help a patient make a life-long commitment lifestyle change including diet, exercise, behavior changes, and taking supplemental vitamins and minerals.    Problems after surgery may require more operations to correct them.    The risks, benefits, alternatives, and potential complications of all of the procedures were explained in detail including, but not limited to death, anesthesia and medication adverse effect, deep venous thrombosis, pulmonary embolism, trocar site/incisional hernia, wound infection, abdominal infection, bleeding, failure to lose weight, gain weight, a change in body image, metabolic complications with vitamin deficiences and  anemia.    Weight loss expectations were discussed with the patient in detail. The weight loss operations most commonly performed are the sleeve gastrectomy and the Justin-en-Y gastric bypass. These operations result in weight losses up to approximately 25-35% of initial body weight 12 to 24 months after surgery with the gastric bypass usually the higher percent of weight loss but depends on patient using the tool.    For the gastric bypass and loop duodenal switch (EVELYN-S) the risks include but not limited to the following early complications:  Anastomotic leak/peritonitis, Justin/Alimentary/biliopancreatic limb obstruction, severe & minor wound infection/seroma, and nausea/vomiting.  Late complications can include but are not limited to malnutrition, vitamin deficiencies, frequent loose stools,  stomal stenosis, marginal ulcer, bowel obstruction, intussusception, internal, and incisional hernia.    Regarding the gastric sleeve, there is less long-term outcome data and higher risk of dysphagia and reflux leading to possible Oakley's esophagus compared to a gastric bypass, as well as risk of internal visceral/organ injury, splenectomy, bleeding, infection, leak (which could require further intervention possible conversion to Justin-en-Y gastric bypass), stenosis and possibility of regaining weight.    Norberto was counseled regarding diagnostic results, instructions for management, risk factor reductions, prognosis, patient and family education, impressions, risks and benefits of treatment options and importance of compliance with treatment. Total time of the encounter was over 45 minutes counseling the patient regarding the procedure as above and reviewing as well as ordering labs, medications and the procedure.  The chart was also reviewed prior to seeing the patient reviewing previous testing, studies and labs.    Norberto understands the surgical procedures and the different surgical options that are available.  He  understands the lifestyle changes that are required after surgery and has agreed to follow the guidelines outlined in the weight management program.  He also expressed understanding of the risks involved and had all of male questions answered and desires to proceed.      Regan Cole MD  3/31/2022

## 2022-03-31 NOTE — PATIENT INSTRUCTIONS
Bariatric Manual    You were provided a manual specific to the procedure that you have chosen.  Please refer to that with any questions or call the office at [phone]

## 2022-04-04 ENCOUNTER — TELEPHONE (OUTPATIENT)
Dept: FAMILY MEDICINE CLINIC | Facility: CLINIC | Age: 47
End: 2022-04-04

## 2022-04-04 DIAGNOSIS — M1A.0710 IDIOPATHIC CHRONIC GOUT OF RIGHT FOOT WITHOUT TOPHUS: ICD-10-CM

## 2022-04-04 RX ORDER — COLCHICINE 0.6 MG/1
0.6 TABLET ORAL DAILY
Qty: 90 TABLET | Refills: 0 | Status: SHIPPED | OUTPATIENT
Start: 2022-04-04

## 2022-04-04 NOTE — TELEPHONE ENCOUNTER
"Drug interaction; \"Coreg increases the concentration of Colchicine\" per [facility] Pharmacist.     Ok for [facility] in [place] to fill?         "

## 2022-04-04 NOTE — TELEPHONE ENCOUNTER
----- Message from Norberto Keita sent at 4/1/2022  5:55 PM EDT -----  Regarding: Colchicine refill   Hi Christopherjewel- I know I mentioned that I didn’t need a refill of the colchicine on our last appointment March 9th. However, over the last week I started taking again bc I felt a gout attack coming on. Luckily I had some left to take to get it under control. I am now officially out of the colchicine. Are you able to send a refill for this?     Thank you.   Norberto Keita

## 2022-04-11 ENCOUNTER — PRE-ADMISSION TESTING (OUTPATIENT)
Dept: PREADMISSION TESTING | Facility: HOSPITAL | Age: 47
End: 2022-04-11

## 2022-04-11 VITALS
SYSTOLIC BLOOD PRESSURE: 128 MMHG | RESPIRATION RATE: 20 BRPM | TEMPERATURE: 97.6 F | HEIGHT: 73 IN | OXYGEN SATURATION: 94 % | DIASTOLIC BLOOD PRESSURE: 82 MMHG | HEART RATE: 87 BPM | BODY MASS INDEX: 47.74 KG/M2

## 2022-04-11 DIAGNOSIS — E66.01 OBESITY, CLASS III, BMI 40-49.9 (MORBID OBESITY): ICD-10-CM

## 2022-04-11 LAB
QT INTERVAL: 381 MS
SARS-COV-2 ORF1AB RESP QL NAA+PROBE: NOT DETECTED

## 2022-04-11 PROCEDURE — C9803 HOPD COVID-19 SPEC COLLECT: HCPCS

## 2022-04-11 PROCEDURE — 93010 ELECTROCARDIOGRAM REPORT: CPT | Performed by: INTERNAL MEDICINE

## 2022-04-11 PROCEDURE — 93005 ELECTROCARDIOGRAM TRACING: CPT

## 2022-04-11 PROCEDURE — U0004 COV-19 TEST NON-CDC HGH THRU: HCPCS

## 2022-04-11 RX ORDER — CHLORHEXIDINE GLUCONATE 500 MG/1
CLOTH TOPICAL TAKE AS DIRECTED
COMMUNITY
End: 2022-04-14 | Stop reason: HOSPADM

## 2022-04-11 NOTE — DISCHARGE INSTRUCTIONS
Take only the following medications the morning of surgery:   CARVEDILOL    ARRIVAL TIME FOR SURGERY IS 6:30 AM      Do not take Bariatric Vitamins, Folic Acid, Actigall (if applicable) or Lovenox Injections (if applicable) the morning of surgery.  If you have a history of blood clots or have a BMI greater than 50, Dr. Cole may order Lovenox for after surgery. Do not take Lovenox blood thinner before surgery.      General Instructions:   Drink one 20 ounce Gatorade G2 the evening before surgery.  Nothing red in color.  Do not eat solid food after midnight the night before surgery.    The morning of surgery have another 20 ounce Gatorade G2.  Again, nothing red in color.  Your drink must be completed 2 hours before your arrival time.   Patients who avoid smoking, chewing tobacco and alcohol for 4 weeks prior to surgery have a reduced risk of post-operative complications.  Quit smoking as many days before surgery as you can.  Do not smoke, use chewing tobacco or drink alcohol the day of surgery.   Bring any papers given to you in the doctor's office.  Wear clean comfortable clothes.  Do not wear contact lenses, false eyelashes or make-up.  Bring a case for your glasses.   Bring crutches or walker if applicable.  Remove all piercings.  Leave jewelry and any other valuables at home.  Remove fingernail polish, gel overlays or any artificial nails.  Hair extensions with metal clips must be removed prior to surgery.  The Pre-Admission Testing nurse will instruct you to bring medications if unable to obtain an accurate list in Pre-Admission Testing.    If you were given a blood bank ID arm band remember to bring it with you the day of surgery.    Preventing a Surgical Site Infection:  For 2 to 3 days before surgery, avoid shaving with a razor because the razor can irritate skin and make it easier to develop an infection.    Any areas of open skin can increase the risk of a post-operative wound infection by allowing  bacteria to enter and travel throughout the body.  Notify your surgeon if you have any skin wounds / rashes even if it is not near the expected surgical site.  The area will need assessed to determine if surgery should be delayed until it is healed.  2 days prior to surgery, take a shower using a fresh bar of anti-bacterial soap (such as Dial).  Use a clean washcloth and dry with a clean towel.    The day prior to surgery, take a shower using a fresh bar of anti-bacterial soap (such as Dial).  Use a clean washcloth and dry with a clean towel.  Sleep in a clean bed with clean clothing.  Do not allow pets to sleep with you.  The morning of surgery shower using a fresh bar of anti-bacterial soap (such as Dial).  Use a clean washcloth and dry with a clean towel.  Follow the Chlorhexidine instructions below.    CHLORHEXIDINE CLOTH INSTRUCTIONS  The morning of surgery follow these instructions using the Chlorhexidine cloths you've been given.  These steps reduce bacteria on the body.  Do not use the cloths near your eyes, ears mouth, genitalia or on open wounds.  Throw the cloths away after use but do not try to flush them down a toilet.    Open and remove one cloth at a time from the package.    Leave the cloth unfolded and begin the bathing.  Massage the skin with the cloths using gentle pressure to remove bacteria.  Do not scrub harshly.   Follow the steps below with one 2% CHG cloth per area (6 total cloths).  One cloth for neck, shoulders and chest.  One cloth for both arms, hands, fingers and underarms (do underarms last).  One cloth for the abdomen followed by groin.  One cloth for right leg and foot including between the toes.  One cloth for left leg and foot including between the toes.  The last cloth is to be used for the back of the neck, back and buttocks.    Allow the CHG to air dry 3 minutes on the skin which will give it time to work and decrease the chance of irritation.  The skin may feel sticky until it  is dry.  Do not rinse with water or any other liquid or you will lose the beneficial effects of the CHG.  If mild skin irritation occurs, do rinse the skin to remove the CHG.  Report this to the nurse at time of admission.  Do not apply lotions, creams, ointments, deodorants or perfumes after using the clothes. Dress in clean clothes before coming to the hospital.    Ask your surgeon if you will be receiving antibiotics prior to surgery.  Make sure you, your family, and all healthcare providers clean their hands with soap and water or an alcohol based hand  before caring for you or your wound.      Day of surgery:  Your arrival time is approximately two hours before your scheduled surgery time.  Upon arrival, a Pre-op nurse and Anesthesiologist will review your health history, obtain vital signs, and answer questions you may have.  A Pre-op nurse will start an IV and you may receive medication in preparation for surgery, including something to help you relax.  If applicable, we do ask that you have your C-PAP/BI-PAP machine available. It can be utilized the night of surgery.     Please be aware that surgery does come with discomfort.  We want to make every effort to control your discomfort so please discuss any uncontrolled symptoms with your nurse.   Your doctor will most likely have prescribed pain medications.      If you are going home after surgery you will receive individualized written care instructions before being discharged.  A responsible adult must drive you to and from the hospital on the day of your surgery and stay with you for 24 hours.  Discharge prescriptions can be filled by the hospital pharmacy during regular pharmacy hours.  If you are having surgery late in the day/evening your prescription may be e-prescribed to your pharmacy.  Please verify your pharmacy hours or chose a 24 hour pharmacy to avoid not having access to your prescription because your pharmacy has closed for the  day.    If you are staying overnight following surgery, you will be transported to your hospital room following the recovery period.  [facility] has all private rooms.    If you have any questions please call Pre-Admission Testing at [phone].  Deductibles and co-payments are collected on the day of service. Please be prepared to pay the required co-pay, deductible or deposit on the day of service as defined by your plan.    Patient Education for Self-Quarantine Process    Following your COVID testing, we strongly recommend that you wear a mask when you are with other people and practice social distancing.   Limit your activities to only required outings.  Wash your hands with soap and water frequently for at least 20 seconds.   Avoid touching your eyes, nose and mouth with unwashed hands.  Do not share anything - utensils, drinking glasses, food from the same bowl.   Sanitize household surfaces daily. Include all high touch areas (door handles, light switches, phones, countertops, etc.)    Call your surgeon immediately if you experience any of the following symptoms:  Sore Throat  Shortness of Breath or difficulty breathing  Cough  Chills  Body soreness or muscle pain  Headache  Fever  New loss of taste or smell  Do not arrive for your surgery ill.  Your procedure will need to be rescheduled to another time.  You will need to call your physician before the day of surgery to avoid any unnecessary exposure to hospital staff as well as other patients.

## 2022-04-12 ENCOUNTER — ANESTHESIA EVENT (OUTPATIENT)
Dept: PERIOP | Facility: HOSPITAL | Age: 47
End: 2022-04-12

## 2022-04-13 ENCOUNTER — ANESTHESIA (OUTPATIENT)
Dept: PERIOP | Facility: HOSPITAL | Age: 47
End: 2022-04-13

## 2022-04-13 ENCOUNTER — HOSPITAL ENCOUNTER (INPATIENT)
Facility: HOSPITAL | Age: 47
LOS: 1 days | Discharge: HOME | End: 2022-04-14
Attending: SURGERY | Admitting: SURGERY

## 2022-04-13 DIAGNOSIS — I50.33 ACUTE ON CHRONIC DIASTOLIC CONGESTIVE HEART FAILURE: ICD-10-CM

## 2022-04-13 DIAGNOSIS — E66.01 OBESITY, CLASS III, BMI 40-49.9 (MORBID OBESITY): ICD-10-CM

## 2022-04-13 PROCEDURE — 88307 TISSUE EXAM BY PATHOLOGIST: CPT | Performed by: SURGERY

## 2022-04-13 PROCEDURE — 25010000002 MAGNESIUM SULFATE PER 500 MG OF MAGNESIUM: Performed by: NURSE ANESTHETIST, CERTIFIED REGISTERED

## 2022-04-13 PROCEDURE — 43775 LAP SLEEVE GASTRECTOMY: CPT | Performed by: NURSE PRACTITIONER

## 2022-04-13 PROCEDURE — 25010000002 PHENYLEPHRINE 10 MG/ML SOLUTION: Performed by: NURSE ANESTHETIST, CERTIFIED REGISTERED

## 2022-04-13 PROCEDURE — 25010000002 ROPIVACAINE PER 1 MG: Performed by: SURGERY

## 2022-04-13 PROCEDURE — C1889 IMPLANT/INSERT DEVICE, NOC: HCPCS | Performed by: SURGERY

## 2022-04-13 PROCEDURE — 0DB64Z3 EXCISION OF STOMACH, PERCUTANEOUS ENDOSCOPIC APPROACH, VERTICAL: ICD-10-PCS | Performed by: SURGERY

## 2022-04-13 PROCEDURE — 43775 LAP SLEEVE GASTRECTOMY: CPT | Performed by: SURGERY

## 2022-04-13 PROCEDURE — 25010000002 DEXAMETHASONE PER 1 MG: Performed by: NURSE ANESTHETIST, CERTIFIED REGISTERED

## 2022-04-13 PROCEDURE — 25010000002 ONDANSETRON PER 1 MG: Performed by: NURSE ANESTHETIST, CERTIFIED REGISTERED

## 2022-04-13 PROCEDURE — 25010000002 METOCLOPRAMIDE PER 10 MG: Performed by: SURGERY

## 2022-04-13 PROCEDURE — 25010000002 SUCCINYLCHOLINE PER 20 MG: Performed by: NURSE ANESTHETIST, CERTIFIED REGISTERED

## 2022-04-13 PROCEDURE — 25010000002 EPINEPHRINE 1 MG/ML SOLUTION 30 ML VIAL: Performed by: SURGERY

## 2022-04-13 PROCEDURE — 25010000002 CEFAZOLIN PER 500 MG: Performed by: SURGERY

## 2022-04-13 PROCEDURE — 25010000002 PROPOFOL 10 MG/ML EMULSION: Performed by: NURSE ANESTHETIST, CERTIFIED REGISTERED

## 2022-04-13 PROCEDURE — 25010000002 KETOROLAC TROMETHAMINE PER 15 MG: Performed by: SURGERY

## 2022-04-13 PROCEDURE — 25010000002 FENTANYL CITRATE (PF) 50 MCG/ML SOLUTION: Performed by: NURSE ANESTHETIST, CERTIFIED REGISTERED

## 2022-04-13 PROCEDURE — 25010000002 CLONIDINE PER 1 MG: Performed by: SURGERY

## 2022-04-13 DEVICE — IMPLANTABLE DEVICE
Type: IMPLANTABLE DEVICE | Site: ABDOMEN | Status: FUNCTIONAL
Brand: TITAN SGS STANDARD GASTRIC STAPLER

## 2022-04-13 DEVICE — SEALANT WND FIBRIN TISSEEL PREFIL/SYR/PRIMAFZ 4ML: Type: IMPLANTABLE DEVICE | Site: ABDOMEN | Status: FUNCTIONAL

## 2022-04-13 RX ORDER — HYDROCODONE BITARTRATE AND ACETAMINOPHEN 5; 325 MG/1; MG/1
1 TABLET ORAL ONCE AS NEEDED
Status: DISCONTINUED | OUTPATIENT
Start: 2022-04-13 | End: 2022-04-13 | Stop reason: HOSPADM

## 2022-04-13 RX ORDER — NALOXONE HCL 0.4 MG/ML
0.1 VIAL (ML) INJECTION
Status: DISCONTINUED | OUTPATIENT
Start: 2022-04-13 | End: 2022-04-14 | Stop reason: HOSPADM

## 2022-04-13 RX ORDER — CHLORHEXIDINE GLUCONATE 0.12 MG/ML
15 RINSE ORAL SEE ADMIN INSTRUCTIONS
Status: COMPLETED | OUTPATIENT
Start: 2022-04-13 | End: 2022-04-13

## 2022-04-13 RX ORDER — LIDOCAINE HYDROCHLORIDE 10 MG/ML
0.5 INJECTION, SOLUTION EPIDURAL; INFILTRATION; INTRACAUDAL; PERINEURAL ONCE AS NEEDED
Status: DISCONTINUED | OUTPATIENT
Start: 2022-04-13 | End: 2022-04-13 | Stop reason: HOSPADM

## 2022-04-13 RX ORDER — ONDANSETRON 2 MG/ML
4 INJECTION INTRAMUSCULAR; INTRAVENOUS EVERY 4 HOURS PRN
Status: DISCONTINUED | OUTPATIENT
Start: 2022-04-13 | End: 2022-04-14 | Stop reason: HOSPADM

## 2022-04-13 RX ORDER — METOCLOPRAMIDE HYDROCHLORIDE 5 MG/ML
10 INJECTION INTRAMUSCULAR; INTRAVENOUS ONCE
Status: COMPLETED | OUTPATIENT
Start: 2022-04-13 | End: 2022-04-13

## 2022-04-13 RX ORDER — DIPHENHYDRAMINE HYDROCHLORIDE 50 MG/ML
25 INJECTION INTRAMUSCULAR; INTRAVENOUS EVERY 4 HOURS PRN
Status: DISCONTINUED | OUTPATIENT
Start: 2022-04-13 | End: 2022-04-14 | Stop reason: HOSPADM

## 2022-04-13 RX ORDER — SODIUM CHLORIDE 0.9 % (FLUSH) 0.9 %
3 SYRINGE (ML) INJECTION EVERY 12 HOURS SCHEDULED
Status: DISCONTINUED | OUTPATIENT
Start: 2022-04-13 | End: 2022-04-14 | Stop reason: HOSPADM

## 2022-04-13 RX ORDER — PHENYLEPHRINE HYDROCHLORIDE 10 MG/ML
INJECTION INTRAVENOUS AS NEEDED
Status: DISCONTINUED | OUTPATIENT
Start: 2022-04-13 | End: 2022-04-13 | Stop reason: SURG

## 2022-04-13 RX ORDER — PROCHLORPERAZINE EDISYLATE 5 MG/ML
10 INJECTION INTRAMUSCULAR; INTRAVENOUS EVERY 6 HOURS PRN
Status: DISCONTINUED | OUTPATIENT
Start: 2022-04-13 | End: 2022-04-14 | Stop reason: HOSPADM

## 2022-04-13 RX ORDER — ACETAMINOPHEN 160 MG/5ML
975 SOLUTION ORAL EVERY 6 HOURS
Status: DISCONTINUED | OUTPATIENT
Start: 2022-04-13 | End: 2022-04-14 | Stop reason: HOSPADM

## 2022-04-13 RX ORDER — LOSARTAN POTASSIUM 100 MG/1
100 TABLET ORAL DAILY
Status: DISCONTINUED | OUTPATIENT
Start: 2022-04-13 | End: 2022-04-14

## 2022-04-13 RX ORDER — CARVEDILOL 12.5 MG/1
12.5 TABLET ORAL EVERY 12 HOURS
Status: DISCONTINUED | OUTPATIENT
Start: 2022-04-13 | End: 2022-04-14 | Stop reason: HOSPADM

## 2022-04-13 RX ORDER — PROMETHAZINE HYDROCHLORIDE 25 MG/1
12.5 TABLET ORAL ONCE AS NEEDED
Status: DISCONTINUED | OUTPATIENT
Start: 2022-04-13 | End: 2022-04-13 | Stop reason: HOSPADM

## 2022-04-13 RX ORDER — GABAPENTIN 250 MG/5ML
300 SOLUTION ORAL ONCE
Status: COMPLETED | OUTPATIENT
Start: 2022-04-13 | End: 2022-04-13

## 2022-04-13 RX ORDER — CYANOCOBALAMIN 1000 UG/ML
1000 INJECTION, SOLUTION INTRAMUSCULAR; SUBCUTANEOUS ONCE
Status: COMPLETED | OUTPATIENT
Start: 2022-04-14 | End: 2022-04-14

## 2022-04-13 RX ORDER — MIRTAZAPINE 15 MG/1
15 TABLET, ORALLY DISINTEGRATING ORAL NIGHTLY
Status: DISCONTINUED | OUTPATIENT
Start: 2022-04-13 | End: 2022-04-14 | Stop reason: HOSPADM

## 2022-04-13 RX ORDER — SUCCINYLCHOLINE CHLORIDE 20 MG/ML
INJECTION INTRAMUSCULAR; INTRAVENOUS AS NEEDED
Status: DISCONTINUED | OUTPATIENT
Start: 2022-04-13 | End: 2022-04-13 | Stop reason: SURG

## 2022-04-13 RX ORDER — PROMETHAZINE HYDROCHLORIDE 12.5 MG/1
12.5 SUPPOSITORY RECTAL EVERY 4 HOURS PRN
Status: DISCONTINUED | OUTPATIENT
Start: 2022-04-13 | End: 2022-04-14 | Stop reason: HOSPADM

## 2022-04-13 RX ORDER — MAGNESIUM SULFATE HEPTAHYDRATE 500 MG/ML
INJECTION, SOLUTION INTRAMUSCULAR; INTRAVENOUS AS NEEDED
Status: DISCONTINUED | OUTPATIENT
Start: 2022-04-13 | End: 2022-04-13 | Stop reason: SURG

## 2022-04-13 RX ORDER — HYDROCODONE BITARTRATE AND ACETAMINOPHEN 7.5; 325 MG/1; MG/1
1 TABLET ORAL EVERY 4 HOURS PRN
Status: DISCONTINUED | OUTPATIENT
Start: 2022-04-13 | End: 2022-04-13 | Stop reason: HOSPADM

## 2022-04-13 RX ORDER — SODIUM CHLORIDE 0.9 % (FLUSH) 0.9 %
10 SYRINGE (ML) INJECTION EVERY 12 HOURS SCHEDULED
Status: DISCONTINUED | OUTPATIENT
Start: 2022-04-13 | End: 2022-04-13 | Stop reason: HOSPADM

## 2022-04-13 RX ORDER — LORAZEPAM 2 MG/ML
1 INJECTION INTRAMUSCULAR EVERY 12 HOURS PRN
Status: DISCONTINUED | OUTPATIENT
Start: 2022-04-13 | End: 2022-04-14 | Stop reason: HOSPADM

## 2022-04-13 RX ORDER — MAGNESIUM HYDROXIDE 1200 MG/15ML
LIQUID ORAL AS NEEDED
Status: DISCONTINUED | OUTPATIENT
Start: 2022-04-13 | End: 2022-04-13 | Stop reason: HOSPADM

## 2022-04-13 RX ORDER — SODIUM CHLORIDE, SODIUM LACTATE, POTASSIUM CHLORIDE, CALCIUM CHLORIDE 600; 310; 30; 20 MG/100ML; MG/100ML; MG/100ML; MG/100ML
100 INJECTION, SOLUTION INTRAVENOUS CONTINUOUS
Status: DISCONTINUED | OUTPATIENT
Start: 2022-04-13 | End: 2022-04-13

## 2022-04-13 RX ORDER — ONDANSETRON 2 MG/ML
INJECTION INTRAMUSCULAR; INTRAVENOUS AS NEEDED
Status: DISCONTINUED | OUTPATIENT
Start: 2022-04-13 | End: 2022-04-13 | Stop reason: SURG

## 2022-04-13 RX ORDER — SODIUM CHLORIDE 9 MG/ML
INJECTION, SOLUTION INTRAVENOUS AS NEEDED
Status: DISCONTINUED | OUTPATIENT
Start: 2022-04-13 | End: 2022-04-13 | Stop reason: HOSPADM

## 2022-04-13 RX ORDER — LIDOCAINE HYDROCHLORIDE 20 MG/ML
INJECTION, SOLUTION INFILTRATION; PERINEURAL AS NEEDED
Status: DISCONTINUED | OUTPATIENT
Start: 2022-04-13 | End: 2022-04-13 | Stop reason: SURG

## 2022-04-13 RX ORDER — HYDROMORPHONE HYDROCHLORIDE 1 MG/ML
0.5 INJECTION, SOLUTION INTRAMUSCULAR; INTRAVENOUS; SUBCUTANEOUS
Status: DISCONTINUED | OUTPATIENT
Start: 2022-04-13 | End: 2022-04-13 | Stop reason: HOSPADM

## 2022-04-13 RX ORDER — FENTANYL CITRATE 50 UG/ML
INJECTION, SOLUTION INTRAMUSCULAR; INTRAVENOUS AS NEEDED
Status: DISCONTINUED | OUTPATIENT
Start: 2022-04-13 | End: 2022-04-13 | Stop reason: SURG

## 2022-04-13 RX ORDER — HYDROMORPHONE HYDROCHLORIDE 1 MG/ML
0.5 INJECTION, SOLUTION INTRAMUSCULAR; INTRAVENOUS; SUBCUTANEOUS
Status: DISCONTINUED | OUTPATIENT
Start: 2022-04-13 | End: 2022-04-14 | Stop reason: HOSPADM

## 2022-04-13 RX ORDER — EPHEDRINE SULFATE 50 MG/ML
INJECTION, SOLUTION INTRAVENOUS AS NEEDED
Status: DISCONTINUED | OUTPATIENT
Start: 2022-04-13 | End: 2022-04-13 | Stop reason: SURG

## 2022-04-13 RX ORDER — SODIUM CHLORIDE 0.9 % (FLUSH) 0.9 %
3-10 SYRINGE (ML) INJECTION AS NEEDED
Status: DISCONTINUED | OUTPATIENT
Start: 2022-04-13 | End: 2022-04-13 | Stop reason: HOSPADM

## 2022-04-13 RX ORDER — MIDAZOLAM HYDROCHLORIDE 1 MG/ML
1 INJECTION INTRAMUSCULAR; INTRAVENOUS
Status: DISCONTINUED | OUTPATIENT
Start: 2022-04-13 | End: 2022-04-13 | Stop reason: HOSPADM

## 2022-04-13 RX ORDER — SODIUM CHLORIDE 0.9 % (FLUSH) 0.9 %
10 SYRINGE (ML) INJECTION AS NEEDED
Status: DISCONTINUED | OUTPATIENT
Start: 2022-04-13 | End: 2022-04-13 | Stop reason: HOSPADM

## 2022-04-13 RX ORDER — GABAPENTIN 300 MG/1
300 CAPSULE ORAL EVERY 8 HOURS
Status: DISCONTINUED | OUTPATIENT
Start: 2022-04-13 | End: 2022-04-14 | Stop reason: HOSPADM

## 2022-04-13 RX ORDER — FAMOTIDINE 10 MG/ML
20 INJECTION, SOLUTION INTRAVENOUS EVERY 12 HOURS SCHEDULED
Status: DISCONTINUED | OUTPATIENT
Start: 2022-04-13 | End: 2022-04-14 | Stop reason: HOSPADM

## 2022-04-13 RX ORDER — SODIUM CHLORIDE, SODIUM LACTATE, POTASSIUM CHLORIDE, CALCIUM CHLORIDE 600; 310; 30; 20 MG/100ML; MG/100ML; MG/100ML; MG/100ML
150 INJECTION, SOLUTION INTRAVENOUS CONTINUOUS
Status: DISCONTINUED | OUTPATIENT
Start: 2022-04-13 | End: 2022-04-14 | Stop reason: HOSPADM

## 2022-04-13 RX ORDER — FENTANYL CITRATE 50 UG/ML
50 INJECTION, SOLUTION INTRAMUSCULAR; INTRAVENOUS
Status: DISCONTINUED | OUTPATIENT
Start: 2022-04-13 | End: 2022-04-13 | Stop reason: HOSPADM

## 2022-04-13 RX ORDER — GLYCOPYRROLATE 0.2 MG/ML
0.2 INJECTION INTRAMUSCULAR; INTRAVENOUS
Status: COMPLETED | OUTPATIENT
Start: 2022-04-13 | End: 2022-04-13

## 2022-04-13 RX ORDER — FLUMAZENIL 0.1 MG/ML
0.2 INJECTION INTRAVENOUS AS NEEDED
Status: DISCONTINUED | OUTPATIENT
Start: 2022-04-13 | End: 2022-04-13 | Stop reason: HOSPADM

## 2022-04-13 RX ORDER — PROMETHAZINE HYDROCHLORIDE 25 MG/1
12.5 TABLET ORAL EVERY 4 HOURS PRN
Status: DISCONTINUED | OUTPATIENT
Start: 2022-04-13 | End: 2022-04-14 | Stop reason: HOSPADM

## 2022-04-13 RX ORDER — EPHEDRINE SULFATE 50 MG/ML
5 INJECTION, SOLUTION INTRAVENOUS ONCE AS NEEDED
Status: DISCONTINUED | OUTPATIENT
Start: 2022-04-13 | End: 2022-04-13 | Stop reason: HOSPADM

## 2022-04-13 RX ORDER — DEXAMETHASONE SODIUM PHOSPHATE 10 MG/ML
INJECTION INTRAMUSCULAR; INTRAVENOUS AS NEEDED
Status: DISCONTINUED | OUTPATIENT
Start: 2022-04-13 | End: 2022-04-13 | Stop reason: SURG

## 2022-04-13 RX ORDER — ACETAMINOPHEN 500 MG
1000 TABLET ORAL EVERY 6 HOURS
Status: DISCONTINUED | OUTPATIENT
Start: 2022-04-13 | End: 2022-04-14 | Stop reason: HOSPADM

## 2022-04-13 RX ORDER — PROPOFOL 10 MG/ML
VIAL (ML) INTRAVENOUS AS NEEDED
Status: DISCONTINUED | OUTPATIENT
Start: 2022-04-13 | End: 2022-04-13 | Stop reason: SURG

## 2022-04-13 RX ORDER — BUMETANIDE 1 MG/1
1 TABLET ORAL DAILY
Status: DISCONTINUED | OUTPATIENT
Start: 2022-04-13 | End: 2022-04-14 | Stop reason: HOSPADM

## 2022-04-13 RX ORDER — ACETAMINOPHEN 10 MG/ML
INJECTION, SOLUTION INTRAVENOUS AS NEEDED
Status: DISCONTINUED | OUTPATIENT
Start: 2022-04-13 | End: 2022-04-13 | Stop reason: SURG

## 2022-04-13 RX ORDER — ROCURONIUM BROMIDE 10 MG/ML
INJECTION, SOLUTION INTRAVENOUS AS NEEDED
Status: DISCONTINUED | OUTPATIENT
Start: 2022-04-13 | End: 2022-04-13 | Stop reason: SURG

## 2022-04-13 RX ORDER — PANTOPRAZOLE SODIUM 40 MG/10ML
40 INJECTION, POWDER, LYOPHILIZED, FOR SOLUTION INTRAVENOUS ONCE
Status: COMPLETED | OUTPATIENT
Start: 2022-04-13 | End: 2022-04-13

## 2022-04-13 RX ORDER — ACETAMINOPHEN 10 MG/ML
1000 INJECTION, SOLUTION INTRAVENOUS ONCE
Status: DISCONTINUED | OUTPATIENT
Start: 2022-04-13 | End: 2022-04-13 | Stop reason: HOSPADM

## 2022-04-13 RX ORDER — CEFAZOLIN SODIUM IN 0.9 % NACL 3 G/100 ML
3 INTRAVENOUS SOLUTION, PIGGYBACK (ML) INTRAVENOUS
Status: COMPLETED | OUTPATIENT
Start: 2022-04-13 | End: 2022-04-13

## 2022-04-13 RX ORDER — KETAMINE HYDROCHLORIDE 10 MG/ML
INJECTION INTRAMUSCULAR; INTRAVENOUS AS NEEDED
Status: DISCONTINUED | OUTPATIENT
Start: 2022-04-13 | End: 2022-04-13 | Stop reason: SURG

## 2022-04-13 RX ORDER — SODIUM CHLORIDE, SODIUM LACTATE, POTASSIUM CHLORIDE, CALCIUM CHLORIDE 600; 310; 30; 20 MG/100ML; MG/100ML; MG/100ML; MG/100ML
9 INJECTION, SOLUTION INTRAVENOUS CONTINUOUS PRN
Status: DISCONTINUED | OUTPATIENT
Start: 2022-04-13 | End: 2022-04-13

## 2022-04-13 RX ORDER — ACETAMINOPHEN 160 MG/5ML
975 SOLUTION ORAL ONCE
Status: DISCONTINUED | OUTPATIENT
Start: 2022-04-13 | End: 2022-04-13

## 2022-04-13 RX ORDER — HYDRALAZINE HYDROCHLORIDE 20 MG/ML
10 INJECTION INTRAMUSCULAR; INTRAVENOUS
Status: DISCONTINUED | OUTPATIENT
Start: 2022-04-13 | End: 2022-04-14 | Stop reason: HOSPADM

## 2022-04-13 RX ORDER — HYDROMORPHONE HYDROCHLORIDE 2 MG/1
2 TABLET ORAL EVERY 4 HOURS PRN
Status: DISCONTINUED | OUTPATIENT
Start: 2022-04-13 | End: 2022-04-14 | Stop reason: HOSPADM

## 2022-04-13 RX ORDER — ONDANSETRON 2 MG/ML
4 INJECTION INTRAMUSCULAR; INTRAVENOUS ONCE AS NEEDED
Status: DISCONTINUED | OUTPATIENT
Start: 2022-04-13 | End: 2022-04-13 | Stop reason: HOSPADM

## 2022-04-13 RX ORDER — ONDANSETRON 4 MG/1
4 TABLET, FILM COATED ORAL EVERY 4 HOURS PRN
Status: DISCONTINUED | OUTPATIENT
Start: 2022-04-13 | End: 2022-04-14 | Stop reason: HOSPADM

## 2022-04-13 RX ORDER — SCOLOPAMINE TRANSDERMAL SYSTEM 1 MG/1
1 PATCH, EXTENDED RELEASE TRANSDERMAL CONTINUOUS
Status: DISCONTINUED | OUTPATIENT
Start: 2022-04-13 | End: 2022-04-14 | Stop reason: HOSPADM

## 2022-04-13 RX ORDER — PROMETHAZINE HYDROCHLORIDE 25 MG/1
25 SUPPOSITORY RECTAL ONCE AS NEEDED
Status: DISCONTINUED | OUTPATIENT
Start: 2022-04-13 | End: 2022-04-13 | Stop reason: HOSPADM

## 2022-04-13 RX ORDER — ALBUTEROL SULFATE 2.5 MG/3ML
2.5 SOLUTION RESPIRATORY (INHALATION) EVERY 4 HOURS PRN
Status: DISCONTINUED | OUTPATIENT
Start: 2022-04-13 | End: 2022-04-14 | Stop reason: HOSPADM

## 2022-04-13 RX ORDER — METOCLOPRAMIDE HYDROCHLORIDE 5 MG/ML
10 INJECTION INTRAMUSCULAR; INTRAVENOUS EVERY 6 HOURS
Status: DISCONTINUED | OUTPATIENT
Start: 2022-04-13 | End: 2022-04-14 | Stop reason: HOSPADM

## 2022-04-13 RX ORDER — GABAPENTIN 250 MG/5ML
300 SOLUTION ORAL EVERY 8 HOURS
Status: DISCONTINUED | OUTPATIENT
Start: 2022-04-13 | End: 2022-04-14 | Stop reason: HOSPADM

## 2022-04-13 RX ORDER — ONDANSETRON 4 MG/1
4 TABLET, ORALLY DISINTEGRATING ORAL EVERY 4 HOURS PRN
Status: DISCONTINUED | OUTPATIENT
Start: 2022-04-13 | End: 2022-04-14 | Stop reason: HOSPADM

## 2022-04-13 RX ADMIN — FAMOTIDINE 20 MG: 10 INJECTION INTRAVENOUS at 14:47

## 2022-04-13 RX ADMIN — SUCCINYLCHOLINE CHLORIDE 180 MG: 20 INJECTION, SOLUTION INTRAMUSCULAR; INTRAVENOUS; PARENTERAL at 09:05

## 2022-04-13 RX ADMIN — ROCURONIUM BROMIDE 10 MG: 50 INJECTION INTRAVENOUS at 09:34

## 2022-04-13 RX ADMIN — ACETAMINOPHEN 1000 MG: 500 TABLET ORAL at 12:37

## 2022-04-13 RX ADMIN — SUGAMMADEX 200 MG: 100 INJECTION, SOLUTION INTRAVENOUS at 09:47

## 2022-04-13 RX ADMIN — KETAMINE HYDROCHLORIDE 20 MG: 10 INJECTION INTRAMUSCULAR; INTRAVENOUS at 09:05

## 2022-04-13 RX ADMIN — MIRTAZAPINE 15 MG: 15 TABLET, ORALLY DISINTEGRATING ORAL at 20:33

## 2022-04-13 RX ADMIN — PHENYLEPHRINE HYDROCHLORIDE 200 MCG: 10 INJECTION, SOLUTION INTRAVENOUS at 09:18

## 2022-04-13 RX ADMIN — SODIUM CHLORIDE, POTASSIUM CHLORIDE, SODIUM LACTATE AND CALCIUM CHLORIDE 500 ML: 600; 310; 30; 20 INJECTION, SOLUTION INTRAVENOUS at 07:23

## 2022-04-13 RX ADMIN — ACETAMINOPHEN 1000 MG: 500 TABLET ORAL at 23:50

## 2022-04-13 RX ADMIN — METOCLOPRAMIDE HYDROCHLORIDE 10 MG: 5 INJECTION INTRAMUSCULAR; INTRAVENOUS at 17:24

## 2022-04-13 RX ADMIN — GABAPENTIN 300 MG: 300 CAPSULE ORAL at 12:37

## 2022-04-13 RX ADMIN — FAMOTIDINE 20 MG: 10 INJECTION INTRAVENOUS at 21:50

## 2022-04-13 RX ADMIN — SODIUM CHLORIDE, POTASSIUM CHLORIDE, SODIUM LACTATE AND CALCIUM CHLORIDE: 600; 310; 30; 20 INJECTION, SOLUTION INTRAVENOUS at 09:59

## 2022-04-13 RX ADMIN — EPHEDRINE SULFATE 15 MG: 50 INJECTION INTRAVENOUS at 09:37

## 2022-04-13 RX ADMIN — CHLORHEXIDINE GLUCONATE 15 ML: 1.2 RINSE ORAL at 07:30

## 2022-04-13 RX ADMIN — FOLIC ACID 250 ML/HR: 5 INJECTION, SOLUTION INTRAMUSCULAR; INTRAVENOUS; SUBCUTANEOUS at 23:50

## 2022-04-13 RX ADMIN — ROCURONIUM BROMIDE 10 MG: 50 INJECTION INTRAVENOUS at 09:05

## 2022-04-13 RX ADMIN — GABAPENTIN 300 MG: 250 SOLUTION ORAL at 07:29

## 2022-04-13 RX ADMIN — METOCLOPRAMIDE HYDROCHLORIDE 10 MG: 5 INJECTION INTRAMUSCULAR; INTRAVENOUS at 14:47

## 2022-04-13 RX ADMIN — MAGNESIUM SULFATE HEPTAHYDRATE 2 G: 500 INJECTION, SOLUTION INTRAMUSCULAR; INTRAVENOUS at 09:13

## 2022-04-13 RX ADMIN — DEXAMETHASONE SODIUM PHOSPHATE 8 MG: 10 INJECTION INTRAMUSCULAR; INTRAVENOUS at 09:22

## 2022-04-13 RX ADMIN — LIDOCAINE HYDROCHLORIDE 60 MG: 20 INJECTION, SOLUTION INFILTRATION; PERINEURAL at 09:05

## 2022-04-13 RX ADMIN — PHENYLEPHRINE HYDROCHLORIDE 200 MCG: 10 INJECTION, SOLUTION INTRAVENOUS at 09:22

## 2022-04-13 RX ADMIN — PHENYLEPHRINE HYDROCHLORIDE 200 MCG: 10 INJECTION, SOLUTION INTRAVENOUS at 09:33

## 2022-04-13 RX ADMIN — METOCLOPRAMIDE HYDROCHLORIDE 10 MG: 5 INJECTION INTRAMUSCULAR; INTRAVENOUS at 07:25

## 2022-04-13 RX ADMIN — ONDANSETRON 4 MG: 2 INJECTION INTRAMUSCULAR; INTRAVENOUS at 09:22

## 2022-04-13 RX ADMIN — BUMETANIDE 1 MG: 1 TABLET ORAL at 21:50

## 2022-04-13 RX ADMIN — SCOPALAMINE 1 PATCH: 1 PATCH, EXTENDED RELEASE TRANSDERMAL at 07:28

## 2022-04-13 RX ADMIN — PHENYLEPHRINE HYDROCHLORIDE 200 MCG: 10 INJECTION, SOLUTION INTRAVENOUS at 09:26

## 2022-04-13 RX ADMIN — LOSARTAN POTASSIUM 100 MG: 100 TABLET, FILM COATED ORAL at 14:47

## 2022-04-13 RX ADMIN — ACETAMINOPHEN 1000 MG: 500 TABLET ORAL at 17:24

## 2022-04-13 RX ADMIN — EPHEDRINE SULFATE 15 MG: 50 INJECTION INTRAVENOUS at 09:36

## 2022-04-13 RX ADMIN — METOCLOPRAMIDE HYDROCHLORIDE 10 MG: 5 INJECTION INTRAMUSCULAR; INTRAVENOUS at 23:50

## 2022-04-13 RX ADMIN — PROPOFOL 250 MG: 10 INJECTION, EMULSION INTRAVENOUS at 09:05

## 2022-04-13 RX ADMIN — ROCURONIUM BROMIDE 40 MG: 50 INJECTION INTRAVENOUS at 09:10

## 2022-04-13 RX ADMIN — SODIUM CHLORIDE, POTASSIUM CHLORIDE, SODIUM LACTATE AND CALCIUM CHLORIDE 150 ML/HR: 600; 310; 30; 20 INJECTION, SOLUTION INTRAVENOUS at 19:34

## 2022-04-13 RX ADMIN — ACETAMINOPHEN 1000 MG: 10 INJECTION, SOLUTION INTRAVENOUS at 09:35

## 2022-04-13 RX ADMIN — GLYCOPYRROLATE 0.2 MG: 0.2 INJECTION INTRAMUSCULAR; INTRAVENOUS at 07:35

## 2022-04-13 RX ADMIN — CARVEDILOL 12.5 MG: 12.5 TABLET, FILM COATED ORAL at 21:50

## 2022-04-13 RX ADMIN — PROPOFOL 250 MCG/KG/MIN: 10 INJECTION, EMULSION INTRAVENOUS at 09:11

## 2022-04-13 RX ADMIN — GABAPENTIN 300 MG: 300 CAPSULE ORAL at 20:33

## 2022-04-13 RX ADMIN — FENTANYL CITRATE 100 MCG: 50 INJECTION INTRAMUSCULAR; INTRAVENOUS at 09:02

## 2022-04-13 RX ADMIN — PHENYLEPHRINE HYDROCHLORIDE 200 MCG: 10 INJECTION, SOLUTION INTRAVENOUS at 09:37

## 2022-04-13 RX ADMIN — SUGAMMADEX 200 MG: 100 INJECTION, SOLUTION INTRAVENOUS at 07:53

## 2022-04-13 RX ADMIN — CEFAZOLIN SODIUM 3 G: 10 INJECTION, POWDER, FOR SOLUTION INTRAVENOUS at 08:41

## 2022-04-13 RX ADMIN — PANTOPRAZOLE SODIUM 40 MG: 40 INJECTION, POWDER, FOR SOLUTION INTRAVENOUS at 07:24

## 2022-04-13 NOTE — ANESTHESIA POSTPROCEDURE EVALUATION
Patient: Norberto Keita    Procedure Summary     Date: 04/13/22 Room / Location:  [facility]  [facility]    Anesthesia Start: 0855 Anesthesia Stop: 1012    Procedure: GASTRIC SLEEVE LAPAROSCOPIC (N/A Abdomen) Diagnosis:       Obesity, Class III, BMI 40-49.9 (morbid obesity) (HCC)      (Obesity, Class III, BMI 40-49.9 (morbid obesity) ([facility]) [E66.01])    Surgeons: Regan Cole Jr., MD Provider: Bala Hayden MD    Anesthesia Type: general ASA Status: 3          Anesthesia Type: general    Vitals  Vitals Value Taken Time   /82 04/13/22 1116   Temp 36.3 °C (97.3 °F) 04/13/22 1115   Pulse 72 04/13/22 1125   Resp 16 04/13/22 1115   SpO2 94 % 04/13/22 1126   Vitals shown include unvalidated device data.        Post Anesthesia Care and Evaluation    Patient location during evaluation: bedside  Patient participation: complete - patient participated  Level of consciousness: awake and alert  Pain management: adequate  Airway patency: patent  Anesthetic complications: No anesthetic complications    Cardiovascular status: acceptable  Respiratory status: acceptable  Hydration status: acceptable    Comments: /82   Pulse 75   Temp 36.3 °C (97.3 °F) (Temporal)   Resp 16   SpO2 95%

## 2022-04-13 NOTE — ANESTHESIA PREPROCEDURE EVALUATION
Anesthesia Evaluation     Patient summary reviewed and Nursing notes reviewed   NPO Solid Status: > 8 hours             Airway   Mallampati: II  TM distance: >3 FB  Neck ROM: full  no difficulty expected  Dental - normal exam     Pulmonary - normal exam   (+) sleep apnea,   Cardiovascular - normal exam    (+) hypertension, CHF ,       Neuro/Psych- negative ROS  GI/Hepatic/Renal/Endo    (+) morbid obesity,      Musculoskeletal (-) negative ROS    Abdominal  - normal exam   Substance History - negative use     OB/GYN negative ob/gyn ROS         Other                        Anesthesia Plan    ASA 3     general     intravenous induction     Anesthetic plan, all risks, benefits, and alternatives have been provided, discussed and informed consent has been obtained with: patient.    Plan discussed with CRNA.        CODE STATUS:

## 2022-04-13 NOTE — OP NOTE
PREOPERATIVE DIAGNOSIS:  Morbid obesity with multiple comorbidities as referenced in the most recent history and physical.    POSTOPERATIVE DIAGNOSIS:  Morbid obesity with multiple comorbidities as referenced in the most recent history and physical.    PROCEDURES PERFORMED:  1.  Laparoscopic sleeve gastrectomy with Titan stapler #[id number]  2.  Tisseel application.     SURGEON:  Regan Cole Jr., MD    ASSISTANT: Chele COOK [facility]        Surgery assisted and facilitated by a certified physician assistant, who directly resulted in a decreased operative time, anesthetic time, wound exposure, and possibly of an operative wound infection, thereby decreasing patient morbidity and ultimately total expenditures.  The surgical assistant assisted in placement of trochars, take down of the gastrocolic omentum, short gastric vessels and dissection at the angle of His.  Also assisted in retraction of the stomach during stapling so as not to kink the gastric sleeve.  Also assisted in removing of the gastric specimen, closure of the fascial defect as well as closure of the skin incisions.    ANESTHESIA:  General endotracheal and TAP block with Ropivacaine mixture    ESTIMATED BLOOD LOSS:   Less than 25 mL unless dictated below.    FLUIDS:  Crystalloids.    SPECIMENS:  Gastric remnant    DRAINS:  None.    COUNTS:  Correct.    COMPLICATIONS:  None.    INDICATIONS:  This patient with morbid obesity and associated comorbidities presents for elective laparoscopic, possible open sleeve gastrectomy.  The patient has received medical clearance to proceed.  The patient has undergone our extensive educational process and consent process and wishes to proceed.    DESCRIPTION OF PROCEDURE:  The patient was brought to the operating room and placed supine upon the operating room table. SCD hose were placed.  The patient underwent uneventful general endotracheal anesthesia per the anesthesiology staff. The abdomen was prepped with  ChloraPrep and draped in the usual sterile fashion.  An Ioban was used as well if not allergic.  Anesthesia staff then passed a 38-Fr balloon bougie into the stomach to decompress.  A 5-10 mm transverse incision was made a few centimeters above and to the left of the umbilicus and the peritoneal cavity entered under direct camera visualization using a 5 or 10 mm 0° laparoscope and an Optiview trocar.  The abdomen was then insufflated to a pressure of 15-16 mmHg with CO2 gas.  Exploratory laparoscopy revealed no evidence of injury from the entrance technique and no significant abnormalities unless addendum dictated below.  An angled laparoscope was then used.  The patient was placed in reverse Trendelenburg position.  Under direct camera visualization a 19 mm trocar was placed in the right lateral subcostal position.  A 5 mm trocar was placed in the right midabdominal position.  A 5 mm trocar was placed in the left midabdominal position. A Chelle retractor was placed through an epigastric incision and used to elevate the left lobe of the liver.  The fat pad was elevated and the left isauro exposed.  At this point, approximately CHCF along the greater curvature, the gastrocolic omentum was divided with the Enseal and this proceeded superiorly to the angle of His taking down the short gastric vessels.  All posterior attachments of the lesser sac and posterior aspect of the stomach to the pancreas were taken down as well.  The left isauro was exposed along its length.  Dissection then proceeded medially taking down the greater curvature with an Enseal until just proximal to the pylorus.  The 38 German bougie was then directed distally into the stomach to the pylorus.  The balloon was inflated with 14 cc of saline.  The stomach was marked in the 3 positions using indelible ink.  The 3 markings were at the angle of Hiss, the incisura and antrum using 1cm, 3cm and 4-5cm respectively.  The Titan stapler was then placed  through the 19 mm trocar into the abdomen and opened up and the stomach pulled in to the markings on the stomach.  Careful attention was made to stay 1 cm from the esophagus.  Positive pressure retraction was then used to size the stomach perfectly.  The balloon on the bougie was then deflated and placed to suction prior to closing the stapler.  The stapler was then fired and then removed after completion.  Areas of the staple line were oversewn with absorbable sutures as needed for bleeding or questionable staple lines.  At this point, the gastrectomy specimen was withdrawn through the 12 mm trocar site incision. The specimen staple line was inspected and was intact.  The specimen was then sent off to pathology.  At this point, the sleeve was submerged under saline and using the bougie to insufflate the stomach, a leak test was performed.  This revealed the sleeve to be watertight, no air bubbles, no leak, and no bleeding seen from the staple lines and no significant abnormalities.  Irrigation fluid from the abdomen was then suctioned.  The gastric sleeve staple line was then treated with Tisseel fibrin glue. The fascia at the 19 mm trocar site incision was closed with a single 0 Vicryl suture in a figure-of-eight fashion placed under direct laparoscopic camera visualization with a suture passer and tying the knot extracorporeally.  The fascia in the area was infiltrated with local anesthesia. All incisions were then infiltrated with local anesthetic. The remaining trocars were removed under direct camera visualization with no bleeding noted from their sites.  The abdomen was desufflated of gas. The skin in each incision was closed using 4-0 antibiotic impregnated Monocryl in a subcuticular fashion followed by Dermabond.  The patient tolerated the procedure well without complication and was taken to the recovery room in stable condition.  All sponge, needle and instrument counts were correct.     The hiatus was  checked for a hernia and no hernia was detected

## 2022-04-13 NOTE — ANESTHESIA PROCEDURE NOTES
Airway  Urgency: elective    Date/Time: 4/13/2022 9:07 AM  Airway not difficult    General Information and Staff    Patient location during procedure: OR  CRNA: Winsome Rush CRNA    Indications and Patient Condition  Indications for airway management: airway protection    Preoxygenated: yes  Mask difficulty assessment: 1 - vent by mask    Final Airway Details  Final airway type: endotracheal airway      Successful airway: ETT  Cuffed: yes   Successful intubation technique: direct laryngoscopy  Endotracheal tube insertion site: oral  Blade: Alex  Blade size: 4  ETT size (mm): 8.0  Cormack-Lehane Classification: grade I - full view of glottis  Placement verified by: chest auscultation and capnometry   Cuff volume (mL): 7  Measured from: lips  ETT/EBT  to lips (cm): 21  Number of attempts at approach: 1  Assessment: lips, teeth, and gum same as pre-op and atraumatic intubation    Additional Comments  Smooth IV induction. Trachea intubated. Cuff up. Dentition intact without injury.

## 2022-04-13 NOTE — PLAN OF CARE
Goal Outcome Evaluation:  Plan of Care Reviewed With: patient        Progress: improving  Outcome Evaluation: pod 0 gastric sleeve. vss. ra. ambulates independently, tolerated walk around unit well. bipap while sleeping. pain tolerated with scheduled meds. plan to dc home tomorrow if appropriate. educated on bp monitoring.

## 2022-04-14 ENCOUNTER — READMISSION MANAGEMENT (OUTPATIENT)
Dept: CALL CENTER | Facility: HOSPITAL | Age: 47
End: 2022-04-14

## 2022-04-14 VITALS
BODY MASS INDEX: 41.75 KG/M2 | TEMPERATURE: 97.9 F | SYSTOLIC BLOOD PRESSURE: 111 MMHG | HEIGHT: 73 IN | RESPIRATION RATE: 18 BRPM | HEART RATE: 89 BPM | WEIGHT: 315 LBS | DIASTOLIC BLOOD PRESSURE: 69 MMHG | OXYGEN SATURATION: 98 %

## 2022-04-14 LAB
ALBUMIN SERPL-MCNC: 4.2 G/DL (ref 3.5–5.2)
ALBUMIN/GLOB SERPL: 1.4 G/DL
ALP SERPL-CCNC: 109 U/L (ref 39–117)
ALT SERPL W P-5'-P-CCNC: 62 U/L (ref 1–41)
ANION GAP SERPL CALCULATED.3IONS-SCNC: 9.5 MMOL/L (ref 5–15)
AST SERPL-CCNC: 39 U/L (ref 1–40)
BASOPHILS # BLD AUTO: 0.02 10*3/MM3 (ref 0–0.2)
BASOPHILS NFR BLD AUTO: 0.2 % (ref 0–1.5)
BILIRUB SERPL-MCNC: 1.1 MG/DL (ref 0–1.2)
BUN SERPL-MCNC: 13 MG/DL (ref 6–20)
BUN/CREAT SERPL: 14.9 (ref 7–25)
CALCIUM SPEC-SCNC: 9.5 MG/DL (ref 8.6–10.5)
CHLORIDE SERPL-SCNC: 102 MMOL/L (ref 98–107)
CO2 SERPL-SCNC: 27.5 MMOL/L (ref 22–29)
CREAT SERPL-MCNC: 0.87 MG/DL (ref 0.76–1.27)
DEPRECATED RDW RBC AUTO: 39.9 FL (ref 37–54)
EGFRCR SERPLBLD CKD-EPI 2021: 108.4 ML/MIN/1.73
EOSINOPHIL # BLD AUTO: 0 10*3/MM3 (ref 0–0.4)
EOSINOPHIL NFR BLD AUTO: 0 % (ref 0.3–6.2)
ERYTHROCYTE [DISTWIDTH] IN BLOOD BY AUTOMATED COUNT: 12.7 % (ref 12.3–15.4)
GLOBULIN UR ELPH-MCNC: 3 GM/DL
GLUCOSE SERPL-MCNC: 114 MG/DL (ref 65–99)
HCT VFR BLD AUTO: 43.1 % (ref 37.5–51)
HGB BLD-MCNC: 14.6 G/DL (ref 13–17.7)
IMM GRANULOCYTES # BLD AUTO: 0.04 10*3/MM3 (ref 0–0.05)
IMM GRANULOCYTES NFR BLD AUTO: 0.4 % (ref 0–0.5)
LAB AP CASE REPORT: NORMAL
LYMPHOCYTES # BLD AUTO: 1.33 10*3/MM3 (ref 0.7–3.1)
LYMPHOCYTES NFR BLD AUTO: 13 % (ref 19.6–45.3)
MAGNESIUM SERPL-MCNC: 2.2 MG/DL (ref 1.6–2.6)
MCH RBC QN AUTO: 29.6 PG (ref 26.6–33)
MCHC RBC AUTO-ENTMCNC: 33.9 G/DL (ref 31.5–35.7)
MCV RBC AUTO: 87.4 FL (ref 79–97)
MONOCYTES # BLD AUTO: 0.83 10*3/MM3 (ref 0.1–0.9)
MONOCYTES NFR BLD AUTO: 8.1 % (ref 5–12)
NEUTROPHILS NFR BLD AUTO: 7.99 10*3/MM3 (ref 1.7–7)
NEUTROPHILS NFR BLD AUTO: 78.3 % (ref 42.7–76)
NRBC BLD AUTO-RTO: 0 /100 WBC (ref 0–0.2)
PATH REPORT.FINAL DX SPEC: NORMAL
PATH REPORT.GROSS SPEC: NORMAL
PHOSPHATE SERPL-MCNC: 4.2 MG/DL (ref 2.5–4.5)
PLATELET # BLD AUTO: 229 10*3/MM3 (ref 140–450)
PMV BLD AUTO: 11.3 FL (ref 6–12)
POTASSIUM SERPL-SCNC: 5.1 MMOL/L (ref 3.5–5.2)
PROT SERPL-MCNC: 7.2 G/DL (ref 6–8.5)
RBC # BLD AUTO: 4.93 10*6/MM3 (ref 4.14–5.8)
SODIUM SERPL-SCNC: 139 MMOL/L (ref 136–145)
WBC NRBC COR # BLD: 10.21 10*3/MM3 (ref 3.4–10.8)

## 2022-04-14 PROCEDURE — 25010000002 ENOXAPARIN PER 10 MG: Performed by: SURGERY

## 2022-04-14 PROCEDURE — 83735 ASSAY OF MAGNESIUM: CPT | Performed by: SURGERY

## 2022-04-14 PROCEDURE — 84100 ASSAY OF PHOSPHORUS: CPT | Performed by: SURGERY

## 2022-04-14 PROCEDURE — 25010000002 THIAMINE PER 100 MG: Performed by: SURGERY

## 2022-04-14 PROCEDURE — 85025 COMPLETE CBC W/AUTO DIFF WBC: CPT | Performed by: SURGERY

## 2022-04-14 PROCEDURE — 80053 COMPREHEN METABOLIC PANEL: CPT | Performed by: SURGERY

## 2022-04-14 PROCEDURE — 25010000002 CYANOCOBALAMIN PER 1000 MCG: Performed by: SURGERY

## 2022-04-14 PROCEDURE — 25010000002 METOCLOPRAMIDE PER 10 MG: Performed by: SURGERY

## 2022-04-14 RX ORDER — ONDANSETRON 4 MG/1
4 TABLET, ORALLY DISINTEGRATING ORAL EVERY 4 HOURS PRN
Qty: 20 TABLET | Refills: 0 | Status: SHIPPED | OUTPATIENT
Start: 2022-04-14 | End: 2022-08-22

## 2022-04-14 RX ORDER — HYDROMORPHONE HYDROCHLORIDE 2 MG/1
2 TABLET ORAL EVERY 4 HOURS PRN
Qty: 18 TABLET | Refills: 0 | Status: SHIPPED | OUTPATIENT
Start: 2022-04-14 | End: 2022-04-23

## 2022-04-14 RX ORDER — BUMETANIDE 1 MG/1
1 TABLET ORAL DAILY
Qty: 90 TABLET | Refills: 3
Start: 2022-04-14 | End: 2022-04-25

## 2022-04-14 RX ADMIN — ACETAMINOPHEN 1000 MG: 500 TABLET ORAL at 04:36

## 2022-04-14 RX ADMIN — GABAPENTIN 300 MG: 300 CAPSULE ORAL at 04:36

## 2022-04-14 RX ADMIN — ENOXAPARIN SODIUM 40 MG: 40 INJECTION SUBCUTANEOUS at 09:06

## 2022-04-14 RX ADMIN — CYANOCOBALAMIN 1000 MCG: 1000 INJECTION, SOLUTION INTRAMUSCULAR at 08:57

## 2022-04-14 RX ADMIN — BUMETANIDE 1 MG: 1 TABLET ORAL at 08:56

## 2022-04-14 RX ADMIN — SODIUM CHLORIDE, POTASSIUM CHLORIDE, SODIUM LACTATE AND CALCIUM CHLORIDE 150 ML/HR: 600; 310; 30; 20 INJECTION, SOLUTION INTRAVENOUS at 04:00

## 2022-04-14 RX ADMIN — METOCLOPRAMIDE HYDROCHLORIDE 10 MG: 5 INJECTION INTRAMUSCULAR; INTRAVENOUS at 04:36

## 2022-04-14 RX ADMIN — FAMOTIDINE 20 MG: 10 INJECTION INTRAVENOUS at 08:57

## 2022-04-14 RX ADMIN — CARVEDILOL 12.5 MG: 12.5 TABLET, FILM COATED ORAL at 08:56

## 2022-04-14 RX ADMIN — Medication 3 ML: at 08:57

## 2022-04-14 NOTE — CASE MANAGEMENT/SOCIAL WORK
Case Management Discharge Note      Final Note: pt d/c'ed home         Selected Continued Care - Discharged on 4/14/2022 Admission date: 4/13/2022 - Discharge disposition: Home or Self Care    Destination    No services have been selected for the patient.              Durable Medical Equipment    No services have been selected for the patient.              Dialysis/Infusion    No services have been selected for the patient.              Home Medical Care    No services have been selected for the patient.              Therapy    No services have been selected for the patient.              Community Resources    No services have been selected for the patient.              Community & DME    No services have been selected for the patient.                       Final Discharge Disposition Code: 01 - home or self-care

## 2022-04-14 NOTE — PAYOR COMM NOTE
"DISCHARGED  REF #[id number]      Norberto Keita (46 y.o. Male)             Date of Birth   1975    Social Security Number       Address   [address] [place] [address]    Home Phone   [phone]    MRN   [id number]       Mu-ism   Buddhism    Marital Status                               Admission Date   4/13/22    Admission Type   Elective    Admitting Provider   Neftali Cole Jr., MD    Attending Provider       Department, Room/Bed   [address], P798/1       Discharge Date   4/14/2022    Discharge Disposition   Home or Self Care    Discharge Destination                               Attending Provider: (none)   Allergies: Lisinopril    Isolation: None   Infection: None   Code Status: CPR   Advance Care Planning Activity    Ht: 185.2 cm (72.91\")   Wt: 155 kg (342 lb)    Admission Cmt: None   Principal Problem: Obesity, Class III, BMI 40-49.9 (morbid obesity) ([facility]) [E66.01]                 Active Insurance as of 4/13/2022     Primary Coverage     Payor Plan Insurance Group Employer/Plan Group    ANTHEM BLUE CROSS [id number] [id number]     Payor Plan Address Payor Plan Phone Number Payor Plan Fax Number Effective Dates    [address] [phone]  2/1/2016 - None Entered    [facility] [address]       Subscriber Name Subscriber Birth Date Member ID       AURORA KEITA 5/16/1988 [id number]                 Emergency Contacts      (Rel.) Home Phone Work Phone Mobile Phone    Aurora Keita (Spouse) -- -- [phone]            Discharge Order (From admission, onward)     Start     Ordered    04/14/22 0938  Discharge patient  Once        Expected Discharge Date: 04/14/22    Discharge Disposition: Home or Self Care    Physician of Record for Attribution - Please select from Treatment Team: NEFTALI COLE JR [[id number]]    Review needed by CMO to determine Physician of Record: No       Question Answer Comment   Physician of " Record for Attribution - Please select from Treatment Team NEFTALI MARTÍNEZ JR    Review needed by CMO to determine Physician of Record No        04/14/22 0961

## 2022-04-14 NOTE — PLAN OF CARE
Goal Outcome Evaluation:  Plan of Care Reviewed With: patient           Outcome Evaluation: VSS, RA, LR@150, voiding per BRP, ad minor, ambulating in bell with family, encouraged IS use, educated on BP monitoring and CPAP use, home today

## 2022-04-15 ENCOUNTER — TRANSITIONAL CARE MANAGEMENT TELEPHONE ENCOUNTER (OUTPATIENT)
Dept: CALL CENTER | Facility: HOSPITAL | Age: 47
End: 2022-04-15

## 2022-04-15 RX ORDER — PANTOPRAZOLE SODIUM 40 MG/1
40 TABLET, DELAYED RELEASE ORAL DAILY
Qty: 30 TABLET | Refills: 5 | Status: SHIPPED | OUTPATIENT
Start: 2022-04-15 | End: 2022-05-15

## 2022-04-15 NOTE — OUTREACH NOTE
Prep Survey    Flowsheet Row Responses   [facility] [place] patient discharged from? [place]   Is LACE score < 7 ? Yes   Emergency Room discharge w/ pulse ox? No   Eligibility [facility]   Date of Admission 04/13/22   Date of Discharge 04/14/22   Discharge Disposition Home or Self Care   Discharge diagnosis s/p Laparoscopic sleeve gastrectomy with Titan stapler    Does the patient have one of the following disease processes/diagnoses(primary or secondary)? General Surgery   Does the patient have Home health ordered? No   Is there a DME ordered? No   Prep survey completed? Yes          DANDY KAISER - Registered Nurse

## 2022-04-15 NOTE — OUTREACH NOTE
Call Center TCM Note    Flowsheet Row Responses   [facility] patient discharged from? [place]   Does the patient have one of the following disease processes/diagnoses(primary or secondary)? General Surgery   TCM attempt successful? Yes   Call start time 1540   Call end time 1543   Discharge diagnosis s/p Laparoscopic sleeve gastrectomy with Titan stapler    Meds reviewed with patient/caregiver? Yes   Is the patient having any side effects they believe may be caused by any medication additions or changes? No   Does the patient have all medications related to this admission filled (includes all antibiotics, pain medications, etc.) Yes   Is the patient taking all medications as directed (includes completed medication regime)? Yes   Does the patient have a follow up appointment scheduled with their surgeon? Yes  [4-19-22 surgeon's fu apt]   Has the patient kept scheduled appointments due by today? N/A   Comments Hosp dc fu apt on 4/25/22 with PCP    Psychosocial issues? No   Did the patient receive a copy of their discharge instructions? Yes   Nursing interventions Reviewed instructions with patient   What is the patient's perception of their health status since discharge? Improving   Nursing interventions Nurse provided patient education   Is the patient /caregiver able to teach back basic post-op care? Keep incision areas clean,dry and protected, Continue use of incentive spirometry at least 1 week post discharge   Is the patient/caregiver able to teach back signs and symptoms of incisional infection? Increased redness, swelling or pain at the incisonal site, Increased drainage or bleeding, Incisional warmth   Is the patient/caregiver able to teach back steps to recovery at home? Set small, achievable goals for return to baseline health, Rest and rebuild strength, gradually increase activity   If the patient is a current smoker, are they able to teach back resources for cessation? Not a smoker   Is the  patient/caregiver able to teach back the hierarchy of who to call/visit for symptoms/problems? PCP, Specialist, Home health nurse, Urgent Care, ED, 911 Yes   TCM call completed? Yes          Krystle Jimenez RN    4/15/2022, 15:45 EDT

## 2022-04-15 NOTE — DISCHARGE SUMMARY
"Discharge Summary    Patient name: Norberto Keita    Medical record number: [id number]    Admission date: 4/13/2022  Discharge date:  4/14/2022    Attending physician: Dr. Regan Cole    Primary care physician: Cam Cheung MD    Referring physician: Regan Cole Jr., MD  [address] [address]    Condition on discharge: Stable    Primary Diagnoses:  Morbid obesity with co-morbidities    Operative Procedure:  Laparoscopic sleeve gastrectomy     Norberto Keita  is post op day one status post procedure listed. Patient denies shortness of air and lower extremity pain. Feels better than yesterday. No vomiting this am. Ambulating well and using incentive spirometer.          /69 (BP Location: Left arm, Patient Position: Lying)   Pulse 89   Temp 97.9 °F (36.6 °C) (Oral)   Resp 18   Ht 185.2 cm (72.91\")   Wt (!) 155 kg (342 lb)   SpO2 98%   BMI 45.23 kg/m²     General:  alert, appears stated age and cooperative   Abdomen: soft, bowel sounds active, appropriate tenderness   Incision:   healing well, no drainage, no erythema, no hernia, no seroma, no swelling, no dehiscence, incision well approximated   Heart: Regular rate   Lungs: Clear to auscultation bilaterally     I reviewed the patient's new clinical results.     Lab Results (last 24 hours)     Procedure Component Value Units Date/Time    CBC & Differential [[id number]]  (Abnormal) Collected: 04/14/22 0611    Specimen: Blood Updated: 04/14/22 0709    Narrative:      The following orders were created for panel order CBC & Differential.  Procedure                               Abnormality         Status                     ---------                               -----------         ------                     CBC Auto Differential[[id number]]        Abnormal            Final result                 Please view results for these tests on the individual orders.    CBC Auto Differential [[id number]]  (Abnormal) Collected: " 04/14/22 0611    Specimen: Blood Updated: 04/14/22 0709     WBC 10.21 10*3/mm3      RBC 4.93 10*6/mm3      Hemoglobin 14.6 g/dL      Hematocrit 43.1 %      MCV 87.4 fL      MCH 29.6 pg      MCHC 33.9 g/dL      RDW 12.7 %      RDW-SD 39.9 fl      MPV 11.3 fL      Platelets 229 10*3/mm3      Neutrophil % 78.3 %      Lymphocyte % 13.0 %      Monocyte % 8.1 %      Eosinophil % 0.0 %      Basophil % 0.2 %      Immature Grans % 0.4 %      Neutrophils, Absolute 7.99 10*3/mm3      Lymphocytes, Absolute 1.33 10*3/mm3      Monocytes, Absolute 0.83 10*3/mm3      Eosinophils, Absolute 0.00 10*3/mm3      Basophils, Absolute 0.02 10*3/mm3      Immature Grans, Absolute 0.04 10*3/mm3      nRBC 0.0 /100 WBC     Comprehensive Metabolic Panel [[id number]]  (Abnormal) Collected: 04/14/22 0611    Specimen: Blood Updated: 04/14/22 0708     Glucose 114 mg/dL      BUN 13 mg/dL      Creatinine 0.87 mg/dL      Sodium 139 mmol/L      Potassium 5.1 mmol/L      Chloride 102 mmol/L      CO2 27.5 mmol/L      Calcium 9.5 mg/dL      Total Protein 7.2 g/dL      Albumin 4.20 g/dL      ALT (SGPT) 62 U/L      AST (SGOT) 39 U/L      Alkaline Phosphatase 109 U/L      Total Bilirubin 1.1 mg/dL      Globulin 3.0 gm/dL      A/G Ratio 1.4 g/dL      BUN/Creatinine Ratio 14.9     Anion Gap 9.5 mmol/L      eGFR 108.4 mL/min/1.73      Comment: National Kidney Foundation and American Society of Nephrology (ASN) Task Force recommended calculation based on the Chronic Kidney Disease Epidemiology Collaboration (CKD-EPI) equation refit without adjustment for race.       Narrative:      GFR Normal >60  Chronic Kidney Disease <60  Kidney Failure <15      Phosphorus [[id number]]  (Normal) Collected: 04/14/22 0611    Specimen: Blood Updated: 04/14/22 0708     Phosphorus 4.2 mg/dL     Magnesium [[id number]]  (Normal) Collected: 04/14/22 0611    Specimen: Blood Updated: 04/14/22 0708     Magnesium 2.2 mg/dL              Assessment:      Doing well postoperatively.      Plan:    1. Continue Stage 1 diet  2. Continue with ambulation and Incentive spirometry  3. Plan for d/c home    Patient was seen and examined by Dr. Cole.    Hospital Course: The patient is a very pleasant 46 y.o. male that was admitted to the hospital with morbid obesity with comorbidities who underwent laparoscopic sleeve gastrectomy without complication.  Postoperatively the patient was then transferred to the bariatric unit per protocol.  The patient was afebrile with vital signs stable.  They were ambulating well and using the incentive spirometer.  POD 1 the patient was started on bariatric diet which they tolerated without difficulty.  Patient was then discharged home to follow up as an outpatient.        Discharge medications:      Discharge Medications      New Medications      Instructions Start Date   HYDROmorphone 2 MG tablet  Commonly known as: Dilaudid   2 mg, Oral, Every 4 Hours PRN      ondansetron ODT 4 MG disintegrating tablet  Commonly known as: ZOFRAN-ODT   4 mg, Translingual, Every 4 Hours PRN         Changes to Medications      Instructions Start Date   allopurinol 100 MG tablet  Commonly known as: ZYLOPRIM  What changed: additional instructions   100 mg, Oral, Daily, LAST RF-APPT DUE      bumetanide 1 MG tablet  Commonly known as: BUMEX  What changed: additional instructions   1 mg, Oral, Daily, HOLD WHILE ON LIQUID DIET      colchicine 0.6 MG tablet  What changed:   · when to take this  · reasons to take this   0.6 mg, Oral, Daily      tadalafil 20 MG tablet  Commonly known as: Cialis  What changed:   · how much to take  · how to take this  · when to take this  · reasons to take this   1/2-1 PO as needed prior to activity         Continue These Medications      Instructions Start Date   atorvastatin 40 MG tablet  Commonly known as: Lipitor   40 mg, Oral, Nightly      carvedilol 12.5 MG tablet  Commonly known as: COREG   12.5 mg, Oral, Every 12 Hours      folic acid-vit B6-vit B12 2.5-25-1 MG  tablet tablet  Commonly known as: FOLBEE   1 tablet, Oral, Daily      losartan 100 MG tablet  Commonly known as: Cozaar   100 mg, Oral, Daily      ursodiol 300 MG capsule  Commonly known as: Actigall   300 mg, Oral, 2 Times Daily         Stop These Medications    Chlorhexidine Gluconate Cloth 2 % pads        ASK your doctor about these medications      Instructions Start Date   enoxaparin 40 MG/0.4ML solution syringe  Commonly known as: Lovenox  Ask about: Should I take this medication?   40 mg, Subcutaneous, Every 12 Hours Scheduled, Start after surgery unless instructed otherwise             Discharge instructions:  Per Bariatric manual; per our protocol      Follow-up appointment: Follow up with Dr. Cole in the office as scheduled.  If not already scheduled call for appointment at [phone].

## 2022-04-19 ENCOUNTER — OFFICE VISIT (OUTPATIENT)
Dept: BARIATRICS/WEIGHT MGMT | Facility: CLINIC | Age: 47
End: 2022-04-19

## 2022-04-19 VITALS
WEIGHT: 315 LBS | RESPIRATION RATE: 18 BRPM | TEMPERATURE: 97.8 F | SYSTOLIC BLOOD PRESSURE: 128 MMHG | BODY MASS INDEX: 41.75 KG/M2 | HEIGHT: 73 IN | HEART RATE: 83 BPM | DIASTOLIC BLOOD PRESSURE: 81 MMHG

## 2022-04-19 DIAGNOSIS — E66.01 OBESITY, CLASS III, BMI 40-49.9 (MORBID OBESITY): Primary | ICD-10-CM

## 2022-04-19 DIAGNOSIS — Z71.3 DIETARY COUNSELING: ICD-10-CM

## 2022-04-19 DIAGNOSIS — Z98.84 S/P LAPAROSCOPIC SLEEVE GASTRECTOMY: ICD-10-CM

## 2022-04-19 PROCEDURE — 99024 POSTOP FOLLOW-UP VISIT: CPT | Performed by: NURSE PRACTITIONER

## 2022-04-19 NOTE — PROGRESS NOTES
[facility]IATRIC SURGERY  [address]  [facility] [id number] [phone]  [address] [id number] [phone]  Dept: [phone]  4/19/2022      Norberto Keita.  [id number]  [id number]  1975  male      Chief Complaint   Patient presents with   • Post-op     1 week post op sleeve       BH Post-Op Bariatric Surgery:   Norberto Keita is status post laparopscopic Laparoscopic Sleeve procedure, performed on 4/13/2022.     HPI:   Today's weight is (!) 152 kg (335 lb) pounds, today's BMI is Body mass index is 44.3 kg/m².,@ has a  loss of 26 pounds since surgery. The patient reports a decreased portion size and loss of appetite.  Norberto Keita denies nausea, vomiting, reflux, dysphagia and reports tolerating clears.  He is getting  grams of protein per day and 70+ oz of water daily. The patient c/o appropriate post-op incisional discomfort that is improving. he is doing well with protein and water intake so far. Taking their vitamins, walking and using IS. Denies fevers, chills, chest pain or shortness of air.      Diet and Exercise: Diet history reviewed and discussed with the patient. Weight loss/gains to date discussed with the patient. No carbonated beverage consumption and exercising regularly- walking frequently.   Supplements: multivitamins, B-12, calcium, iron, B-1 and Vitamin D.   Patient is taking blood thinner as prescribed: Lovenox  Current outpatient and discharge medications have been reconciled for the patient.  Reviewed by: Jinny Johnson, MICHELLE        Review of Systems   Constitutional: Positive for activity change and appetite change.   Respiratory: Negative for chest tightness and shortness of breath.    Cardiovascular: Negative for chest pain and palpitations.   Gastrointestinal: Positive for abdominal pain. Negative for nausea and vomiting.   All other systems reviewed and are negative.      Patient  Active Problem List   Diagnosis   • Gout of big toe   • Generalized anxiety disorder   • Hypertension, essential   • Reduced libido   • Hypogonadism in male   • Obstructive sleep apnea (bipap)   • Acute on chronic diastolic congestive heart failure (HCC)   • Mixed hyperlipidemia   • Prediabetes   • Dependent edema   • Polyuria   • GERD (gastroesophageal reflux disease)   • Aspiration pneumonia (HCC)   • Erectile dysfunction   • Polycythemia, secondary   • Obesity, Class III, BMI 40-49.9 (morbid obesity) ([facility])       The following portions of the patient's history were reviewed and updated as appropriate: allergies, current medications, past medical history, past surgical history and problem list.    Vitals:    04/19/22 1124   BP: 128/81   Pulse: 83   Resp: 18   Temp: 97.8 °F (36.6 °C)       Physical Exam  Vitals reviewed.   Constitutional:       General: He is not in acute distress.     Appearance: He is morbidly obese.   HENT:      Head: Normocephalic and atraumatic.      Mouth/Throat:      Mouth: Mucous membranes are moist.   Eyes:      General: No scleral icterus.     Extraocular Movements: Extraocular movements intact.      Conjunctiva/sclera: Conjunctivae normal.      Pupils: Pupils are equal, round, and reactive to light.   Cardiovascular:      Rate and Rhythm: Normal rate and regular rhythm.      Heart sounds: Normal heart sounds.   Pulmonary:      Effort: Pulmonary effort is normal. No respiratory distress.      Breath sounds: Normal breath sounds.   Abdominal:      General: Abdomen is flat. Bowel sounds are normal. There is no distension.      Palpations: Abdomen is soft.      Tenderness: There is no abdominal tenderness. There is no guarding.      Comments: Incisions clean, dry, intact; no erythema   Musculoskeletal:         General: Normal range of motion.      Cervical back: Normal range of motion and neck supple.   Skin:     General: Skin is warm and dry.   Neurological:      General: No focal deficit  present.      Mental Status: He is alert and oriented to person, place, and time.   Psychiatric:         Mood and Affect: Mood normal.         Behavior: Behavior normal.         Thought Content: Thought content normal.         Judgment: Judgment normal.         Assessment:   Post-op, the patient is doing well.     No diagnosis found.    Plan:   Reviewed proper postoperative diet.  Questions answered.   Continue lovenox, start actigall tomorrow.  Reviewed with patient the importance of following the manual for diet progression. Increase activity as tolerated. Continue increasing daily intake of protein and water.   Return to work: the patient is to return to 3 weeks from their surgery date with no restrictions unless they develop medical problems in which we will see them back in the office. They received a note in our office today with their return to work date.  Activity restrictions: no lifting, pushing or pulling over 25lbs for 3 weeks.   Recommended patient be sure to get at least 70 grams of protein per day. Discussed with the patient the recommended amount of water per day to intake. Reviewed vitamin requirements. Be sure to do routine exercise and increase activity as tolerated. No asa, nsaids or steroids for 8 weeks if gastric sleeve procedure and lifelong if gastric bypass procedure.. Patient may use miralax as needed if necessary.     Instructions / Recommendations: dietary counseling recommended, recommended a daily protein intake of  grams, vitamin supplement(s) recommended, recommended exercising at least 150 minutes per week, behavior modifications recommended and instructed to call the office for concerns, questions, or problems.     The patient was instructed to follow up at one month follow up appt.     The patient was counseled regarding post op bariatric manual

## 2022-04-25 ENCOUNTER — OFFICE VISIT (OUTPATIENT)
Dept: FAMILY MEDICINE CLINIC | Facility: CLINIC | Age: 47
End: 2022-04-25

## 2022-04-25 VITALS
RESPIRATION RATE: 18 BRPM | TEMPERATURE: 97.3 F | WEIGHT: 315 LBS | HEIGHT: 73 IN | DIASTOLIC BLOOD PRESSURE: 82 MMHG | BODY MASS INDEX: 41.75 KG/M2 | HEART RATE: 78 BPM | SYSTOLIC BLOOD PRESSURE: 114 MMHG

## 2022-04-25 DIAGNOSIS — E78.2 MIXED HYPERLIPIDEMIA: ICD-10-CM

## 2022-04-25 DIAGNOSIS — I50.33 ACUTE ON CHRONIC DIASTOLIC CONGESTIVE HEART FAILURE: ICD-10-CM

## 2022-04-25 DIAGNOSIS — Z09 HOSPITAL DISCHARGE FOLLOW-UP: Primary | ICD-10-CM

## 2022-04-25 DIAGNOSIS — Z98.84 H/O BARIATRIC SURGERY: ICD-10-CM

## 2022-04-25 DIAGNOSIS — I10 HYPERTENSION, ESSENTIAL: ICD-10-CM

## 2022-04-25 PROBLEM — R68.82 REDUCED LIBIDO: Status: RESOLVED | Noted: 2018-05-22 | Resolved: 2022-04-25

## 2022-04-25 PROBLEM — J69.0 ASPIRATION PNEUMONIA (HCC): Status: RESOLVED | Noted: 2021-07-30 | Resolved: 2022-04-25

## 2022-04-25 PROBLEM — Z71.3 DIETARY COUNSELING: Status: RESOLVED | Noted: 2022-04-19 | Resolved: 2022-04-25

## 2022-04-25 PROBLEM — R73.03 PREDIABETES: Status: RESOLVED | Noted: 2021-07-13 | Resolved: 2022-04-25

## 2022-04-25 PROBLEM — R35.89 POLYURIA: Status: RESOLVED | Noted: 2021-07-29 | Resolved: 2022-04-25

## 2022-04-25 PROCEDURE — 99495 TRANSJ CARE MGMT MOD F2F 14D: CPT | Performed by: FAMILY MEDICINE

## 2022-04-25 NOTE — PROGRESS NOTES
Transitional Care Follow Up Visit  Subjective     Norberto Keita is a 46 y.o. male who presents for a transitional care management visit.    Within 48 business hours after discharge our office contacted him via telephone to coordinate his care and needs.      I reviewed and discussed the details of that call along with the discharge summary, hospital problems, inpatient lab results, inpatient diagnostic studies, and consultation reports with Norberto.     Current outpatient and discharge medications have been reconciled for the patient.  Reviewed by: Cam Cheung MD      Date of TCM Phone Call 4/14/2022   [facility]   Date of Admission 4/13/2022   Date of Discharge 4/14/2022   Discharge Disposition Home or Self Care     Risk for Readmission (LACE) Score: 6 (4/14/2022  6:01 AM)      History of Present Illness   Course During Hospital Stay:      Pt did well with bariatric surgery and was discharged in normal fashion  Has been eating mostly liquids  Overall feeling much better with more energy  Has lost some weight in the short time he had the surgery    They have been monitoring his BP at home and stopped losartan and still on coreg at this time  No elevation of BP  He wonders what medicine he can stop     The following portions of the patient's history were reviewed and updated as appropriate: allergies, current medications, past family history, past medical history, past social history, past surgical history and problem list.    Review of Systems   Constitutional: Negative.  Negative for fever.   Respiratory: Negative.    Cardiovascular: Negative.    Gastrointestinal: Negative.  Negative for abdominal pain, nausea and vomiting.   Psychiatric/Behavioral: Negative.        Objective   Physical Exam  Vitals and nursing note reviewed.   Constitutional:       General: He is not in acute distress.     Appearance: Normal appearance. He is well-developed.   Cardiovascular:      Rate and Rhythm: Normal  rate and regular rhythm.      Heart sounds: Normal heart sounds.   Pulmonary:      Effort: Pulmonary effort is normal.      Breath sounds: Normal breath sounds.   Neurological:      Mental Status: He is alert and oriented to person, place, and time.   Psychiatric:         Mood and Affect: Mood normal.         Behavior: Behavior normal.         Thought Content: Thought content normal.         Judgment: Judgment normal.         Assessment/Plan   Diagnoses and all orders for this visit:    1. Hospital discharge follow-up (Primary)    2. H/O bariatric surgery    3. Hypertension, essential    4. Mixed hyperlipidemia    5. Acute on chronic diastolic congestive heart failure (HCC)      He has stopped losartan and BP doing well, will reduce coreg form 12.5 to 6.25 and he will monitor his BP at home.  Will stop atorvastatin and recheck lipid in 3 months  Will stop bumex as well and he will monitor for swelling    He has been feeling fantastic, no complaints at this time.    Did write down med changes for pt and reviewed thee with him.  He will monitor his BP moving forward and our plan to to DC coreg in future      Patient has been erroneously marked as diabetic. Based on the available clinical information, he does not have diabetes and should therefore be excluded from diabetic health maintenance and quality measures for the remainder of the reporting period.

## 2022-05-04 RX ORDER — CYANOCOBALAMIN/FOLIC AC/VIT B6 1-2.5-25MG
TABLET ORAL
Qty: 40 TABLET | Refills: 0 | OUTPATIENT
Start: 2022-05-04

## 2022-07-09 DIAGNOSIS — M1A.0710 IDIOPATHIC CHRONIC GOUT OF RIGHT FOOT WITHOUT TOPHUS: ICD-10-CM

## 2022-07-11 RX ORDER — ALLOPURINOL 100 MG/1
TABLET ORAL
Qty: 90 TABLET | Refills: 0 | Status: SHIPPED | OUTPATIENT
Start: 2022-07-11 | End: 2022-08-22

## 2022-08-22 ENCOUNTER — OFFICE VISIT (OUTPATIENT)
Dept: CARDIOLOGY | Facility: HOSPITAL | Age: 47
End: 2022-08-22

## 2022-08-22 VITALS
BODY MASS INDEX: 38.01 KG/M2 | OXYGEN SATURATION: 98 % | SYSTOLIC BLOOD PRESSURE: 163 MMHG | DIASTOLIC BLOOD PRESSURE: 107 MMHG | WEIGHT: 286.8 LBS | HEART RATE: 63 BPM | RESPIRATION RATE: 18 BRPM | TEMPERATURE: 96 F | HEIGHT: 73 IN

## 2022-08-22 DIAGNOSIS — I10 HYPERTENSION, ESSENTIAL: Primary | ICD-10-CM

## 2022-08-22 PROCEDURE — 99214 OFFICE O/P EST MOD 30 MIN: CPT | Performed by: NURSE PRACTITIONER

## 2022-08-22 RX ORDER — AMLODIPINE BESYLATE 5 MG/1
5 TABLET ORAL DAILY
Qty: 90 TABLET | Refills: 0 | Status: SHIPPED | OUTPATIENT
Start: 2022-08-22 | End: 2022-12-16 | Stop reason: SDUPTHER

## 2022-08-22 RX ORDER — LOSARTAN POTASSIUM 100 MG/1
100 TABLET ORAL DAILY
Qty: 90 TABLET | Refills: 1 | Status: SHIPPED | OUTPATIENT
Start: 2022-08-22

## 2022-08-22 RX ORDER — PANTOPRAZOLE SODIUM 40 MG/1
40 TABLET, DELAYED RELEASE ORAL DAILY
COMMUNITY

## 2022-08-22 RX ORDER — LOSARTAN POTASSIUM 100 MG/1
100 TABLET ORAL DAILY
COMMUNITY
End: 2022-08-22 | Stop reason: SDUPTHER

## 2022-08-22 RX ORDER — ASPIRIN 81 MG/1
81 TABLET ORAL DAILY
COMMUNITY
End: 2022-08-22

## 2022-08-22 RX ORDER — PANTOPRAZOLE SODIUM 40 MG/1
40 TABLET, DELAYED RELEASE ORAL DAILY
COMMUNITY
Start: 2022-07-24 | End: 2022-08-22

## 2022-08-22 NOTE — PROGRESS NOTES
"Chief Complaint  Follow-up and Hypertension    Subjective    History of Present Illness {CC  Problem List  Visit  Diagnosis   Encounters  Notes  Medications  Labs  Result Review Imaging  Media :23}     Norberto Keita, 46 y.o. male with hypertension, obesity, gout, undergoing outpatient evaluation for LEILANI presents to [facility] for Follow-up and Hypertension.  Patient previously hospitalized at [facility] in July 2021 for complaints of elevated blood pressure and acute shortness of breath.  He was admitted to the ICU for acute hypoxic/hypercapnic respiratory failure/diastolic HF exacerbation, and placed on BiPAP.  Patient had ran out of diuretic for approximately 1 week at home before developing symptoms.  Patient recently underwent gastric sleeve surgery in April 2022, 80lb weight loss.     Patient presents today feeling well overall from a cardiovascular standpoint, but with recently increased blood pressures on home monitoring.  Recently notes SBP up to 175 on home monitoring; generally 150-160, heart rates in 60s. Blood pressures initially were decreased after gastric sleeve, but increasing recently. Recently went on a cruise and not on usual home diet.  He denies chest pain, dyspnea, tachypalpitation, syncope.      Objective     Vital Signs:   Vitals:    08/22/22 1230   BP: (!) 163/107   BP Location: Left arm   Patient Position: Sitting   Cuff Size: Adult   Pulse: 63   Resp: 18   Temp: 96 °F (35.6 °C)   TempSrc: Temporal   SpO2: 98%   Weight: 130 kg (286 lb 12.8 oz)   Height: 185.2 cm (72.91\")     Body mass index is 37.93 kg/m².  Physical Exam  Vitals and nursing note reviewed.   Constitutional:       Appearance: Normal appearance.   HENT:      Head: Normocephalic.   Eyes:      Extraocular Movements: Extraocular movements intact.   Neck:      Vascular: No carotid bruit.   Cardiovascular:      Rate and Rhythm: Normal rate and regular rhythm.      Pulses: Normal " pulses.      Heart sounds: Normal heart sounds, S1 normal and S2 normal. No murmur heard.  Pulmonary:      Effort: Pulmonary effort is normal. No respiratory distress.      Breath sounds: Normal breath sounds.   Musculoskeletal:      Cervical back: Neck supple.      Right lower leg: No edema.      Left lower leg: No edema.   Skin:     General: Skin is warm and dry.   Neurological:      General: No focal deficit present.      Mental Status: He is alert.   Psychiatric:         Mood and Affect: Mood normal.         Behavior: Behavior normal.         Thought Content: Thought content normal.        Data Reviewed:{ Labs  Result Review  Imaging  Med Tab  Media :23}     Stress Test With Pet Myocardial Perfusion (Multi Study) (09/10/2021 13:52)  ECG 12 Lead (04/11/2022 07:53)    Adult Transthoracic Echo Complete W/ Cont if Necessary Per Protocol (07/12/2021 20:19)  Adult Transthoracic Echo Limited W/ Cont if Necessary Per Protocol (07/15/2021 13:11)    Magnesium (04/14/2022 06:11)  Comprehensive Metabolic Panel (04/14/2022 06:11)  CBC & Differential (04/14/2022 06:11)      Assessment and Plan {CC Problem List  Visit Diagnosis  ROS  Review (Popup)  Health Maintenance  Quality  BestPractice  Medications  SmartSets  SnapShot Encounters  Media :23}     1. Hypertension, essential  -Elevated at time of visit, reports recently elevated on home monitor  -Initiate amlodipine 5mg daily  - Continue carvedilol, losartan as ordered  -Follow-up in approximately 2 weeks for BP check    2. Acute on chronic diastolic congestive heart failure (CMS/HCC)  -Most recent TTE July 2021 with LVEF 51-55%, moderate to severe concentric hypertrophy.  - Near euvolemic on exam  -Continue BP control as above  -Will message cardiology to re-establish.    3. Obstructive sleep apnea  - Compliant with home Bipap  - Continue Bipap use      Follow Up {Instructions Charge Capture  Follow-up Communications :23}     Return in about 18 days  (around 9/9/2022) for Video visit, BP check.    Patient was given instructions and counseling regarding his condition or for health maintenance advice. Please see specific information pulled into the AVS if appropriate.  Patient was instructed to call the [facility] with any questions, concerns, or worsening symptoms.    *Please note that portions of this note were completed with a voice recognition program. Efforts were made to edit the dictations, but occasionally words are mistranscribed.

## 2022-09-22 ENCOUNTER — TELEMEDICINE (OUTPATIENT)
Dept: SLEEP MEDICINE | Facility: HOSPITAL | Age: 47
End: 2022-09-22

## 2022-09-22 DIAGNOSIS — R63.4 WEIGHT LOSS: ICD-10-CM

## 2022-09-22 DIAGNOSIS — G47.33 OBSTRUCTIVE SLEEP APNEA, ADULT: Primary | ICD-10-CM

## 2022-09-22 PROCEDURE — 99213 OFFICE O/P EST LOW 20 MIN: CPT | Performed by: NURSE PRACTITIONER

## 2022-09-22 NOTE — PROGRESS NOTES
Chief Complaint:   Chief Complaint   Patient presents with   • Follow-up       HPI:    Norberto Keita is a 46 y.o. male here for follow-up of sleep apnea.  Patient was last seen 9/22/2021 for severe obstructive sleep apnea.  Patient is sleeping 6 to 7 hours nightly and does feel rested upon awakening.  He goes to sleep quickly and does not get up during the night.  Patient has an [place] score of 0/24.  Patient did have the gastric sleeve and has lost 100 pounds.  Patient only has very light snoring when sleeping on his back but has had no gasping and no witnessed apneas.  Patient does wish to retest to see if BiPAP is still needed.        Current medications are:   Current Outpatient Medications:   •  amLODIPine (NORVASC) 5 MG tablet, Take 1 tablet by mouth Daily., Disp: 90 tablet, Rfl: 0  •  carvedilol (COREG) 12.5 MG tablet, Take 1 tablet by mouth Every 12 (Twelve) Hours., Disp: 180 tablet, Rfl: 3  •  colchicine 0.6 MG tablet, Take 1 tablet by mouth Daily. (Patient taking differently: Take 0.6 mg by mouth Daily As Needed.), Disp: 90 tablet, Rfl: 0  •  losartan (COZAAR) 100 MG tablet, Take 1 tablet by mouth Daily., Disp: 90 tablet, Rfl: 1  •  pantoprazole (PROTONIX) 40 MG EC tablet, Take 40 mg by mouth Daily., Disp: , Rfl:   •  tadalafil (Cialis) 20 MG tablet, 1/2-1 PO as needed prior to activity (Patient taking differently: Take 20 mg by mouth Daily As Needed. 1/2-1 PO as needed prior to activity), Disp: 30 tablet, Rfl: 5  •  ursodiol (Actigall) 300 MG capsule, Take 1 capsule by mouth 2 (Two) Times a Day., Disp: 60 capsule, Rfl: 5.      The patient's relevant past medical, surgical, family and social history were reviewed and updated in Epic as appropriate.       Review of Systems   Respiratory: Positive for apnea.    Gastrointestinal:        Heartburn   Psychiatric/Behavioral: Positive for sleep disturbance.   All other systems reviewed and are negative.        Objective:    Physical Exam  Pulmonary:       Effort: Pulmonary effort is normal. No respiratory distress.   Neurological:      Mental Status: He is alert and oriented to person, place, and time.   Psychiatric:         Mood and Affect: Mood normal.         Behavior: Behavior normal.         Thought Content: Thought content normal.         Judgment: Judgment normal.         CPAP Report    24/90 days of use  Greater than 4-hour use 16  AHI of 3.0  The patient continues to use and benefit from CPAP therapy.    ASSESSMENT/PLAN    Diagnoses and all orders for this visit:    1. Obstructive sleep apnea, adult (Primary)  -     PAP Therapy  -     Home Sleep Study; Future    2. Weight loss  -     Home Sleep Study; Future        1. Counseled patient regarding multimodal approach with healthy nutrition, healthy sleep, regular physical activity, social activities, counseling, and medications. Encouraged to practice lateral sleep position. Avoid alcohol and sedatives close to bedtime.  2. She has lost over 100 pounds we will retest and he will follow-up after his study.      I have reviewed the results of my evaluation and impression and discussed my recommendations in detail with the patient.      Signed by  MICHELLE Clark    September 22, 2022      CC: Cam Cheung MD         No ref. provider found

## 2022-12-16 DIAGNOSIS — I10 HYPERTENSION, ESSENTIAL: ICD-10-CM

## 2022-12-16 RX ORDER — AMLODIPINE BESYLATE 5 MG/1
5 TABLET ORAL DAILY
Qty: 90 TABLET | Refills: 2 | Status: SHIPPED | OUTPATIENT
Start: 2022-12-16 | End: 2023-03-10 | Stop reason: SDUPTHER

## 2023-03-10 DIAGNOSIS — I10 HYPERTENSION, ESSENTIAL: ICD-10-CM

## 2023-03-10 RX ORDER — AMLODIPINE BESYLATE 5 MG/1
5 TABLET ORAL DAILY
Qty: 90 TABLET | Refills: 2 | Status: SHIPPED | OUTPATIENT
Start: 2023-03-10

## 2023-03-10 NOTE — TELEPHONE ENCOUNTER
Failed refill protocol. Please review and refill if appropriate. A 9-month supply was sent in Deceer 2022 to local pharmacy [facility] is now requesting refills.

## 2023-03-20 NOTE — PLAN OF CARE
Goal Outcome Evaluation:  Plan of Care Reviewed With: patient, spouse        Progress: improving  Outcome Summary: Patient had a good day. Transferred from  this shift. VSS, weaned down to 3.5LNC. C/o intermittent gout pain in elbows, relieved with PRN pain meds. Up in chair, standby assist. Voiding no difficulties. Will continue to monitor closely.   [de-identified] : The patient has pain and weakness in both lower extremities.  Weakness seems to be her predominant symptom.  There is no evidence of significant change in knee arthroplasty over the last 4 years.  I have discussed the pathology and natural history with her.  She is referred for physical therapy for lower extremity strengthening.  She will return as needed.

## 2023-04-11 DIAGNOSIS — I10 HYPERTENSION, ESSENTIAL: ICD-10-CM

## 2023-04-11 RX ORDER — LOSARTAN POTASSIUM 100 MG/1
100 TABLET ORAL DAILY
Qty: 90 TABLET | Refills: 1 | Status: SHIPPED | OUTPATIENT
Start: 2023-04-11

## 2023-04-18 RX ORDER — PANTOPRAZOLE SODIUM 40 MG/1
TABLET, DELAYED RELEASE ORAL
Qty: 90 TABLET | Refills: 3 | OUTPATIENT
Start: 2023-04-18

## 2023-04-24 RX ORDER — PANTOPRAZOLE SODIUM 40 MG/1
40 TABLET, DELAYED RELEASE ORAL DAILY
Qty: 90 TABLET | Refills: 3 | Status: SHIPPED | OUTPATIENT
Start: 2023-04-24

## 2023-06-05 DIAGNOSIS — I10 HYPERTENSION, ESSENTIAL: ICD-10-CM

## 2023-06-06 NOTE — TELEPHONE ENCOUNTER
I have not seen this patient in over a year. Also appears to be following with cardiology. Will forward to PCP

## 2023-06-07 RX ORDER — CARVEDILOL 12.5 MG/1
TABLET ORAL
Qty: 180 TABLET | Refills: 3 | OUTPATIENT
Start: 2023-06-07

## 2024-02-12 DIAGNOSIS — I10 HYPERTENSION, ESSENTIAL: ICD-10-CM

## 2024-02-12 RX ORDER — LOSARTAN POTASSIUM 100 MG/1
100 TABLET ORAL DAILY
Qty: 30 TABLET | Refills: 0 | Status: SHIPPED | OUTPATIENT
Start: 2024-02-12

## 2024-02-12 RX ORDER — AMLODIPINE BESYLATE 5 MG/1
5 TABLET ORAL DAILY
Qty: 30 TABLET | Refills: 0 | Status: SHIPPED | OUTPATIENT
Start: 2024-02-12

## 2024-03-19 ENCOUNTER — OFFICE VISIT (OUTPATIENT)
Dept: FAMILY MEDICINE CLINIC | Facility: CLINIC | Age: 49
End: 2024-03-19
Payer: COMMERCIAL

## 2024-03-19 VITALS
RESPIRATION RATE: 16 BRPM | DIASTOLIC BLOOD PRESSURE: 86 MMHG | WEIGHT: 287.8 LBS | HEIGHT: 73 IN | TEMPERATURE: 97.8 F | SYSTOLIC BLOOD PRESSURE: 116 MMHG | BODY MASS INDEX: 38.14 KG/M2 | OXYGEN SATURATION: 96 % | HEART RATE: 59 BPM

## 2024-03-19 DIAGNOSIS — Z12.11 SCREEN FOR COLON CANCER: ICD-10-CM

## 2024-03-19 DIAGNOSIS — E78.2 MIXED HYPERLIPIDEMIA: ICD-10-CM

## 2024-03-19 DIAGNOSIS — E29.1 HYPOGONADISM IN MALE: ICD-10-CM

## 2024-03-19 DIAGNOSIS — L90.8 SKIN AGING: ICD-10-CM

## 2024-03-19 DIAGNOSIS — M10.9 GOUT OF BIG TOE: ICD-10-CM

## 2024-03-19 DIAGNOSIS — I10 HYPERTENSION, ESSENTIAL: ICD-10-CM

## 2024-03-19 DIAGNOSIS — D75.1 POLYCYTHEMIA, SECONDARY: ICD-10-CM

## 2024-03-19 DIAGNOSIS — Z00.00 ANNUAL PHYSICAL EXAM: Primary | ICD-10-CM

## 2024-03-19 PROBLEM — N52.9 ERECTILE DYSFUNCTION: Status: RESOLVED | Noted: 2021-11-12 | Resolved: 2024-03-19

## 2024-03-19 RX ORDER — AMLODIPINE BESYLATE 5 MG/1
5 TABLET ORAL DAILY
Qty: 30 TABLET | Refills: 0 | Status: SHIPPED | OUTPATIENT
Start: 2024-03-19

## 2024-03-19 RX ORDER — LOSARTAN POTASSIUM 100 MG/1
100 TABLET ORAL DAILY
Qty: 30 TABLET | Refills: 0 | Status: SHIPPED | OUTPATIENT
Start: 2024-03-19

## 2024-03-19 RX ORDER — ONDANSETRON 8 MG/1
8 TABLET, ORALLY DISINTEGRATING ORAL EVERY 8 HOURS PRN
Qty: 20 TABLET | Refills: 1 | Status: SHIPPED | OUTPATIENT
Start: 2024-03-19

## 2024-03-19 RX ORDER — CARVEDILOL 12.5 MG/1
12.5 TABLET ORAL EVERY 12 HOURS
Qty: 180 TABLET | Refills: 3 | Status: SHIPPED | OUTPATIENT
Start: 2024-03-19

## 2024-03-19 NOTE — PROGRESS NOTES
Subjective   Norberto Keita is a 48 y.o. male.     History of Present Illness     Norberto Keita 48 y.o. male who presents for yearly preventive exam.  His overall health is: good  He exercises some swimming and then walking with work..  Last colon screening not done  He does see his dentist regularly  His diet is  typical American but trying to cut back  He describes his alcohol intake as  a little more than he used to  He knows what his cholesterol is No  He is sexually active  He does not have ED or other bedroom issues  Does patient smoke No      The following portions of the patient's history were reviewed and updated as appropriate: allergies, current medications, past family history, past medical history, past social history, past surgical history, and problem list.    Review of Systems   Constitutional: Negative.    Respiratory: Negative.     Cardiovascular: Negative.    Psychiatric/Behavioral: Negative.         Objective   Physical Exam  Vitals and nursing note reviewed.   Constitutional:       General: He is not in acute distress.     Appearance: Normal appearance. He is well-developed.   HENT:      Head: Normocephalic and atraumatic.      Right Ear: Tympanic membrane, ear canal and external ear normal.      Left Ear: Tympanic membrane, ear canal and external ear normal.      Nose: Nose normal.   Eyes:      Extraocular Movements: Extraocular movements intact.      Conjunctiva/sclera: Conjunctivae normal.   Neck:      Thyroid: No thyromegaly.   Cardiovascular:      Rate and Rhythm: Normal rate and regular rhythm.      Heart sounds: Normal heart sounds. No murmur heard.  Pulmonary:      Effort: Pulmonary effort is normal. No respiratory distress.      Breath sounds: Normal breath sounds.   Abdominal:      General: Bowel sounds are normal. There is no distension.      Palpations: Abdomen is soft.      Tenderness: There is no abdominal tenderness.   Musculoskeletal:      Cervical back: Normal range of  motion and neck supple.   Lymphadenopathy:      Cervical: No cervical adenopathy.   Skin:     General: Skin is warm and dry.   Neurological:      Mental Status: He is alert and oriented to person, place, and time.   Psychiatric:         Mood and Affect: Mood normal.         Behavior: Behavior normal.         Thought Content: Thought content normal.         Judgment: Judgment normal.       Assessment & Plan   Diagnoses and all orders for this visit:    1. Annual physical exam (Primary)  -     CBC & Differential  -     Comprehensive Metabolic Panel  -     Lipid Panel  -     Uric Acid    2. Skin aging  -     Ambulatory Referral to Dermatology    3. Hypertension, essential  -     amLODIPine (NORVASC) 5 MG tablet; Take 1 tablet by mouth Daily.  Dispense: 30 tablet; Refill: 0  -     carvedilol (COREG) 12.5 MG tablet; Take 1 tablet by mouth Every 12 (Twelve) Hours.  Dispense: 180 tablet; Refill: 3  -     losartan (COZAAR) 100 MG tablet; Take 1 tablet by mouth Daily.  Dispense: 30 tablet; Refill: 0  -     CBC & Differential  -     Comprehensive Metabolic Panel  -     Uric Acid    4. Gout of big toe  -     Uric Acid    5. Polycythemia, secondary  -     CBC & Differential    6. Mixed hyperlipidemia  -     CBC & Differential  -     Comprehensive Metabolic Panel  -     Lipid Panel    7. Hypogonadism in male  -     Testosterone    8. Screen for colon cancer  -     Cologuard - Stool, Per Rectum; Future    Other orders  -     ondansetron ODT (ZOFRAN-ODT) 8 MG disintegrating tablet; Place 1 tablet on the tongue Every 8 (Eight) Hours As Needed for Nausea or Vomiting.  Dispense: 20 tablet; Refill: 1      Counseled pt about well adult care including diet and exercise, BMI 38 d/w pt.  Cologuaishan ordered    Will continue his BP medicine, good control  Recheck labs and f/u pending results.  Lifestyle changes to control issues discussed

## 2024-03-20 LAB
ALBUMIN SERPL-MCNC: 4.6 G/DL (ref 4.1–5.1)
ALBUMIN/GLOB SERPL: 1.8 {RATIO} (ref 1.2–2.2)
ALP SERPL-CCNC: 92 IU/L (ref 44–121)
ALT SERPL-CCNC: 34 IU/L (ref 0–44)
AST SERPL-CCNC: 31 IU/L (ref 0–40)
BASOPHILS # BLD AUTO: 0.1 X10E3/UL (ref 0–0.2)
BASOPHILS NFR BLD AUTO: 1 %
BILIRUB SERPL-MCNC: 1.8 MG/DL (ref 0–1.2)
BUN SERPL-MCNC: 12 MG/DL (ref 6–24)
BUN/CREAT SERPL: 13 (ref 9–20)
CALCIUM SERPL-MCNC: 9.7 MG/DL (ref 8.7–10.2)
CHLORIDE SERPL-SCNC: 100 MMOL/L (ref 96–106)
CHOLEST SERPL-MCNC: 254 MG/DL (ref 100–199)
CO2 SERPL-SCNC: 23 MMOL/L (ref 20–29)
CREAT SERPL-MCNC: 0.9 MG/DL (ref 0.76–1.27)
EGFRCR SERPLBLD CKD-EPI 2021: 106 ML/MIN/1.73
EOSINOPHIL # BLD AUTO: 0.9 X10E3/UL (ref 0–0.4)
EOSINOPHIL NFR BLD AUTO: 13 %
ERYTHROCYTE [DISTWIDTH] IN BLOOD BY AUTOMATED COUNT: 12.9 % (ref 11.6–15.4)
GLOBULIN SER CALC-MCNC: 2.5 G/DL (ref 1.5–4.5)
GLUCOSE SERPL-MCNC: 102 MG/DL (ref 70–99)
HCT VFR BLD AUTO: 50 % (ref 37.5–51)
HDLC SERPL-MCNC: 58 MG/DL
HGB BLD-MCNC: 17.4 G/DL (ref 13–17.7)
IMM GRANULOCYTES # BLD AUTO: 0.1 X10E3/UL (ref 0–0.1)
IMM GRANULOCYTES NFR BLD AUTO: 1 %
LDLC SERPL CALC-MCNC: 174 MG/DL (ref 0–99)
LYMPHOCYTES # BLD AUTO: 2.4 X10E3/UL (ref 0.7–3.1)
LYMPHOCYTES NFR BLD AUTO: 36 %
MCH RBC QN AUTO: 31.2 PG (ref 26.6–33)
MCHC RBC AUTO-ENTMCNC: 34.8 G/DL (ref 31.5–35.7)
MCV RBC AUTO: 90 FL (ref 79–97)
MONOCYTES # BLD AUTO: 0.5 X10E3/UL (ref 0.1–0.9)
MONOCYTES NFR BLD AUTO: 8 %
NEUTROPHILS # BLD AUTO: 2.8 X10E3/UL (ref 1.4–7)
NEUTROPHILS NFR BLD AUTO: 41 %
PLATELET # BLD AUTO: 178 X10E3/UL (ref 150–450)
POTASSIUM SERPL-SCNC: 4.2 MMOL/L (ref 3.5–5.2)
PROT SERPL-MCNC: 7.1 G/DL (ref 6–8.5)
RBC # BLD AUTO: 5.58 X10E6/UL (ref 4.14–5.8)
SODIUM SERPL-SCNC: 139 MMOL/L (ref 134–144)
TESTOST SERPL-MCNC: 387 NG/DL (ref 264–916)
TRIGL SERPL-MCNC: 122 MG/DL (ref 0–149)
URATE SERPL-MCNC: 6.2 MG/DL (ref 3.8–8.4)
VLDLC SERPL CALC-MCNC: 22 MG/DL (ref 5–40)
WBC # BLD AUTO: 6.7 X10E3/UL (ref 3.4–10.8)

## 2024-03-20 RX ORDER — ATORVASTATIN CALCIUM 40 MG/1
40 TABLET, FILM COATED ORAL NIGHTLY
Qty: 90 TABLET | Refills: 1 | Status: SHIPPED | OUTPATIENT
Start: 2024-03-20

## 2024-04-17 RX ORDER — PANTOPRAZOLE SODIUM 40 MG/1
40 TABLET, DELAYED RELEASE ORAL DAILY
Qty: 90 TABLET | Refills: 3 | Status: SHIPPED | OUTPATIENT
Start: 2024-04-17

## 2024-05-09 DIAGNOSIS — I10 HYPERTENSION, ESSENTIAL: ICD-10-CM

## 2024-05-10 RX ORDER — AMLODIPINE BESYLATE 5 MG/1
5 TABLET ORAL DAILY
Qty: 30 TABLET | Refills: 0 | Status: SHIPPED | OUTPATIENT
Start: 2024-05-10

## 2024-06-10 DIAGNOSIS — I10 HYPERTENSION, ESSENTIAL: ICD-10-CM

## 2024-06-11 RX ORDER — AMLODIPINE BESYLATE 5 MG/1
5 TABLET ORAL DAILY
Qty: 30 TABLET | Refills: 0 | Status: SHIPPED | OUTPATIENT
Start: 2024-06-11

## 2024-06-16 DIAGNOSIS — I10 HYPERTENSION, ESSENTIAL: ICD-10-CM

## 2024-06-18 RX ORDER — LOSARTAN POTASSIUM 100 MG/1
100 TABLET ORAL DAILY
Qty: 30 TABLET | Refills: 0 | Status: SHIPPED | OUTPATIENT
Start: 2024-06-18

## 2024-06-25 ENCOUNTER — OFFICE VISIT (OUTPATIENT)
Dept: FAMILY MEDICINE CLINIC | Facility: CLINIC | Age: 49
End: 2024-06-25
Payer: COMMERCIAL

## 2024-06-25 VITALS
WEIGHT: 289 LBS | DIASTOLIC BLOOD PRESSURE: 90 MMHG | BODY MASS INDEX: 38.3 KG/M2 | OXYGEN SATURATION: 94 % | HEIGHT: 73 IN | HEART RATE: 84 BPM | RESPIRATION RATE: 16 BRPM | SYSTOLIC BLOOD PRESSURE: 138 MMHG | TEMPERATURE: 97.8 F

## 2024-06-25 DIAGNOSIS — U07.1 COVID-19 VIRUS DETECTED: Primary | ICD-10-CM

## 2024-06-25 PROCEDURE — 99213 OFFICE O/P EST LOW 20 MIN: CPT | Performed by: FAMILY MEDICINE

## 2024-06-25 RX ORDER — LIDOCAINE HYDROCHLORIDE 20 MG/ML
SOLUTION OROPHARYNGEAL
Qty: 200 ML | Refills: 1 | Status: SHIPPED | OUTPATIENT
Start: 2024-06-25

## 2024-06-25 NOTE — PROGRESS NOTES
Subjective   Norberto Keita is a 48 y.o. male.     History of Present Illness     He was ill last week, started after being on a cruise    Tested + for COVID Last week    Still with cough and congestion    The following portions of the patient's history were reviewed and updated as appropriate: allergies, current medications, past family history, past medical history, past social history, past surgical history, and problem list.    Review of Systems    Objective   Physical Exam  Vitals and nursing note reviewed.   Constitutional:       General: He is not in acute distress.     Appearance: Normal appearance. He is well-developed.   Cardiovascular:      Rate and Rhythm: Normal rate and regular rhythm.      Heart sounds: Normal heart sounds.   Pulmonary:      Effort: Pulmonary effort is normal.      Breath sounds: Normal breath sounds. No wheezing or rhonchi.   Lymphadenopathy:      Cervical: No cervical adenopathy.   Neurological:      Mental Status: He is alert and oriented to person, place, and time.   Psychiatric:         Mood and Affect: Mood normal.         Behavior: Behavior normal.         Thought Content: Thought content normal.         Judgment: Judgment normal.         Assessment & Plan   Diagnoses and all orders for this visit:    1. COVID-19 virus detected (Primary)  -     Chlorcyclizine-Pseudoephed 25-60 MG tablet; 1/2-1 po q 8 hours PRN  Dispense: 30 tablet; Refill: 1  -     Lidocaine Viscous HCl (XYLOCAINE) 2 % solution; 10-15 ml gargle and spit or swallow every 4 hours as needed  Dispense: 200 mL; Refill: 1    Symptomatic treatment for COVID symptoms.  Stahist and lidocaine.  Rest, fluids.    First day he missed work 6/19 and we will keep him off work until th 28th  RTW on July 1 without restrictions

## 2024-06-28 ENCOUNTER — PATIENT MESSAGE (OUTPATIENT)
Dept: FAMILY MEDICINE CLINIC | Facility: CLINIC | Age: 49
End: 2024-06-28
Payer: COMMERCIAL

## 2024-07-01 ENCOUNTER — TELEPHONE (OUTPATIENT)
Dept: FAMILY MEDICINE CLINIC | Facility: CLINIC | Age: 49
End: 2024-07-01
Payer: COMMERCIAL

## 2024-07-01 DIAGNOSIS — U07.1 COVID-19 VIRUS DETECTED: ICD-10-CM

## 2024-07-01 DIAGNOSIS — R05.1 ACUTE COUGH: Primary | ICD-10-CM

## 2024-07-01 RX ORDER — HYDROCODONE POLISTIREX AND CHLORPHENIRAMINE POLISTIREX 10; 8 MG/5ML; MG/5ML
SUSPENSION, EXTENDED RELEASE ORAL
Qty: 100 ML | Refills: 0 | Status: SHIPPED | OUTPATIENT
Start: 2024-07-01

## 2024-07-01 NOTE — TELEPHONE ENCOUNTER
Caller: Norberto Keita    Relationship: Self    Best call back number: [phone]     What form or medical record are you requesting: FMLA/ SHORT TERM DISABILITY PAPERWORK    Who is requesting this form or medical record from you: DIANE    How would you like to receive the form or medical records (pick-up, mail, fax): FAX    If fax, what is the fax number: FAX NUMBER IS IN PAPERWORK BEING SENT    Timeframe paperwork needed: ASAP    Additional notes: PLEASE BE ON THE LOOK OUT FOR THIS PAPERWORK.

## 2024-07-01 NOTE — TELEPHONE ENCOUNTER
----- Message from [facility] [employer] sent at 6/28/2024 10:45 AM EDT -----  Regarding: Cough overnight   Contact: [phone]  Martha Cheung- I was in earlier this week with my sore throat and cough from Covid. I am having a hard time sleeping at night due to my excessive coughing and mucus. Is there a medicine you can call in to help with my ongoing cough and mucus?

## 2024-07-03 NOTE — TELEPHONE ENCOUNTER
Paperwork completed and faxed back.  Left detailed message on patient identifiable vm. Advised to call the office back with any questions.

## 2024-07-09 DIAGNOSIS — I10 HYPERTENSION, ESSENTIAL: ICD-10-CM

## 2024-07-09 RX ORDER — AMLODIPINE BESYLATE 5 MG/1
5 TABLET ORAL DAILY
Qty: 30 TABLET | Refills: 0 | Status: SHIPPED | OUTPATIENT
Start: 2024-07-09

## 2024-07-14 DIAGNOSIS — I10 HYPERTENSION, ESSENTIAL: ICD-10-CM

## 2024-07-15 RX ORDER — LOSARTAN POTASSIUM 100 MG/1
100 TABLET ORAL DAILY
Qty: 30 TABLET | Refills: 0 | Status: SHIPPED | OUTPATIENT
Start: 2024-07-15

## 2024-09-11 ENCOUNTER — HOSPITAL ENCOUNTER (OUTPATIENT)
Dept: GENERAL RADIOLOGY | Facility: HOSPITAL | Age: 49
Discharge: HOME | End: 2024-09-11
Admitting: FAMILY MEDICINE
Payer: COMMERCIAL

## 2024-09-11 ENCOUNTER — OFFICE VISIT (OUTPATIENT)
Dept: FAMILY MEDICINE CLINIC | Facility: CLINIC | Age: 49
End: 2024-09-11
Payer: COMMERCIAL

## 2024-09-11 VITALS
TEMPERATURE: 98.7 F | HEART RATE: 72 BPM | HEIGHT: 73 IN | RESPIRATION RATE: 18 BRPM | SYSTOLIC BLOOD PRESSURE: 155 MMHG | WEIGHT: 286 LBS | BODY MASS INDEX: 37.91 KG/M2 | DIASTOLIC BLOOD PRESSURE: 82 MMHG

## 2024-09-11 DIAGNOSIS — R07.81 RIB PAIN ON RIGHT SIDE: Primary | ICD-10-CM

## 2024-09-11 DIAGNOSIS — R07.81 RIB PAIN ON RIGHT SIDE: ICD-10-CM

## 2024-09-11 PROCEDURE — 99213 OFFICE O/P EST LOW 20 MIN: CPT | Performed by: FAMILY MEDICINE

## 2024-09-11 PROCEDURE — 71101 X-RAY EXAM UNILAT RIBS/CHEST: CPT

## 2024-09-11 RX ORDER — HYDROCODONE BITARTRATE AND ACETAMINOPHEN 5; 325 MG/1; MG/1
1 TABLET ORAL EVERY 8 HOURS PRN
Qty: 12 TABLET | Refills: 0 | Status: SHIPPED | OUTPATIENT
Start: 2024-09-11

## 2024-09-11 RX ORDER — NAPROXEN 500 MG/1
500 TABLET ORAL 2 TIMES DAILY WITH MEALS
Qty: 60 TABLET | Refills: 0 | Status: SHIPPED | OUTPATIENT
Start: 2024-09-11

## 2024-09-11 NOTE — PROGRESS NOTES
Subjective   Norberto Keita is a 48 y.o. male.     History of Present Illness     He jumped on the bed 9/8/24 and landed on his hand and he felt a crack in his R anterior chest wall  Hurts to take deep breat, cough and sneeze  Any movement makes this pain better  Hard to sleep as well      Review of Systems    Objective   Physical Exam  Vitals and nursing note reviewed.   Constitutional:       General: He is not in acute distress.     Appearance: Normal appearance. He is well-developed.   Cardiovascular:      Rate and Rhythm: Normal rate and regular rhythm.      Heart sounds: Normal heart sounds.   Pulmonary:      Effort: Pulmonary effort is normal.      Breath sounds: Normal breath sounds. No wheezing or rhonchi.   Musculoskeletal:        Arms:    Neurological:      Mental Status: He is alert and oriented to person, place, and time.   Psychiatric:         Mood and Affect: Mood normal.         Behavior: Behavior normal.         Thought Content: Thought content normal.         Judgment: Judgment normal.         Assessment & Plan   Diagnoses and all orders for this visit:    1. Rib pain on right side (Primary)  -     XR Ribs Right With PA Chest; Future  -     HYDROcodone-acetaminophen (NORCO) 5-325 MG per tablet; Take 1 tablet by mouth Every 8 (Eight) Hours As Needed for Severe Pain.  Dispense: 12 tablet; Refill: 0  -     naproxen (Naprosyn) 500 MG tablet; Take 1 tablet by mouth 2 (Two) Times a Day With Meals.  Dispense: 60 tablet; Refill: 0      Will check XR ribsto determine fracture vs costochondritis  Ok short term pain medicine and NSAID.  Will f/u pending XR

## 2024-09-13 RX ORDER — ATORVASTATIN CALCIUM 40 MG/1
40 TABLET, FILM COATED ORAL NIGHTLY
Qty: 90 TABLET | Refills: 0 | Status: SHIPPED | OUTPATIENT
Start: 2024-09-13

## 2024-10-06 DIAGNOSIS — I10 HYPERTENSION, ESSENTIAL: ICD-10-CM

## 2024-10-07 RX ORDER — LOSARTAN POTASSIUM 100 MG/1
100 TABLET ORAL DAILY
Qty: 90 TABLET | Refills: 0 | Status: SHIPPED | OUTPATIENT
Start: 2024-10-07

## 2024-10-07 RX ORDER — AMLODIPINE BESYLATE 5 MG/1
5 TABLET ORAL DAILY
Qty: 90 TABLET | Refills: 0 | Status: SHIPPED | OUTPATIENT
Start: 2024-10-07

## 2024-11-15 DIAGNOSIS — I10 HYPERTENSION, ESSENTIAL: ICD-10-CM

## 2024-11-15 DIAGNOSIS — M1A.0710 IDIOPATHIC CHRONIC GOUT OF RIGHT FOOT WITHOUT TOPHUS: ICD-10-CM

## 2024-11-15 RX ORDER — LOSARTAN POTASSIUM 100 MG/1
100 TABLET ORAL DAILY
Qty: 90 TABLET | Refills: 0 | OUTPATIENT
Start: 2024-11-15

## 2024-11-15 RX ORDER — CARVEDILOL 12.5 MG/1
12.5 TABLET ORAL EVERY 12 HOURS
Qty: 180 TABLET | Refills: 3 | OUTPATIENT
Start: 2024-11-15

## 2024-11-15 RX ORDER — AMLODIPINE BESYLATE 5 MG/1
5 TABLET ORAL DAILY
Qty: 90 TABLET | Refills: 0 | OUTPATIENT
Start: 2024-11-15

## 2024-11-15 RX ORDER — COLCHICINE 0.6 MG/1
0.6 TABLET ORAL DAILY
Qty: 90 TABLET | Refills: 0 | OUTPATIENT
Start: 2024-11-15

## 2024-11-15 NOTE — TELEPHONE ENCOUNTER
Caller: [facility] - [address] [phone] FX    Relationship: Pharmacy    Requested Prescriptions:   Requested Prescriptions     Pending Prescriptions Disp Refills    amLODIPine (NORVASC) 5 MG tablet 90 tablet 0     Sig: Take 1 tablet by mouth Daily.    carvedilol (COREG) 12.5 MG tablet 180 tablet 3     Sig: Take 1 tablet by mouth Every 12 (Twelve) Hours.    losartan (COZAAR) 100 MG tablet 90 tablet 0     Sig: Take 1 tablet by mouth Daily.    colchicine 0.6 MG tablet 90 tablet 0     Sig: Take 1 tablet by mouth Daily.        Pharmacy where request should be sent: [facility] - [place] [phone] FX     Last office visit with prescribing clinician: 9/11/2024   Last telemedicine visit with prescribing clinician: Visit date not found   Next office visit with prescribing clinician: Visit date not found     Additional details provided by patient:     Does the patient have less than a 3 day supply:  [] Yes  [x] No    Would you like a call back once the refill request has been completed: [] Yes [x] No    If the office needs to give you a call back, can they leave a voicemail: [] Yes [x] No    Veronica Taylor Rep   11/15/24 08:56 EST

## 2025-01-21 DIAGNOSIS — I10 HYPERTENSION, ESSENTIAL: ICD-10-CM

## 2025-01-21 RX ORDER — LOSARTAN POTASSIUM 100 MG/1
100 TABLET ORAL DAILY
Qty: 90 TABLET | Refills: 0 | Status: SHIPPED | OUTPATIENT
Start: 2025-01-21

## 2025-01-21 RX ORDER — AMLODIPINE BESYLATE 5 MG/1
5 TABLET ORAL DAILY
Qty: 90 TABLET | Refills: 0 | Status: SHIPPED | OUTPATIENT
Start: 2025-01-21

## 2025-01-27 ENCOUNTER — OFFICE VISIT (OUTPATIENT)
Dept: FAMILY MEDICINE CLINIC | Facility: CLINIC | Age: 50
End: 2025-01-27
Payer: COMMERCIAL

## 2025-01-27 VITALS
HEART RATE: 81 BPM | TEMPERATURE: 97.9 F | DIASTOLIC BLOOD PRESSURE: 118 MMHG | RESPIRATION RATE: 18 BRPM | HEIGHT: 73 IN | SYSTOLIC BLOOD PRESSURE: 162 MMHG | BODY MASS INDEX: 37.91 KG/M2 | WEIGHT: 286 LBS

## 2025-01-27 DIAGNOSIS — E78.2 MIXED HYPERLIPIDEMIA: ICD-10-CM

## 2025-01-27 DIAGNOSIS — I50.33 ACUTE ON CHRONIC DIASTOLIC CONGESTIVE HEART FAILURE: ICD-10-CM

## 2025-01-27 DIAGNOSIS — F10.10 ALCOHOL ABUSE: ICD-10-CM

## 2025-01-27 DIAGNOSIS — I10 HYPERTENSION, ESSENTIAL: ICD-10-CM

## 2025-01-27 DIAGNOSIS — J18.9 WALKING PNEUMONIA: Primary | ICD-10-CM

## 2025-01-27 PROCEDURE — 99214 OFFICE O/P EST MOD 30 MIN: CPT | Performed by: FAMILY MEDICINE

## 2025-01-27 RX ORDER — NALTREXONE HYDROCHLORIDE 50 MG/1
50 TABLET, FILM COATED ORAL DAILY
Qty: 90 TABLET | Refills: 1 | Status: SHIPPED | OUTPATIENT
Start: 2025-01-27

## 2025-01-27 RX ORDER — AZITHROMYCIN 250 MG/1
TABLET, FILM COATED ORAL
Qty: 6 TABLET | Refills: 0 | Status: SHIPPED | OUTPATIENT
Start: 2025-01-27

## 2025-01-27 RX ORDER — CARVEDILOL PHOSPHATE 80 MG/1
80 CAPSULE, EXTENDED RELEASE ORAL DAILY
Qty: 90 CAPSULE | Refills: 0 | Status: SHIPPED | OUTPATIENT
Start: 2025-01-27

## 2025-01-27 RX ORDER — BROMPHENIRAMINE MALEATE, PSEUDOEPHEDRINE HYDROCHLORIDE, AND DEXTROMETHORPHAN HYDROBROMIDE 2; 30; 10 MG/5ML; MG/5ML; MG/5ML
5 SYRUP ORAL 4 TIMES DAILY PRN
Qty: 180 ML | Refills: 0 | Status: SHIPPED | OUTPATIENT
Start: 2025-01-27

## 2025-01-27 RX ORDER — ATORVASTATIN CALCIUM 40 MG/1
40 TABLET, FILM COATED ORAL NIGHTLY
Qty: 90 TABLET | Refills: 0 | Status: SHIPPED | OUTPATIENT
Start: 2025-01-27

## 2025-01-27 NOTE — PROGRESS NOTES
Subjective   Norberto Keita is a 49 y.o. male.     Cough  Pertinent negatives include no fever or shortness of breath.        He has had a cough for the past coup[le months  This has waxed and waned during this time  No SOA noted  Nonsmoker  No fevers        BP is too high still  He continues to take his coreg 12.5 BID (but often forgets night dose), norvasc 5, and losartan 100  No SE with meds  BP was high last time as well      He continues to have issues with alcohol  He asks about naltrexone  Has had times wihtout drinking but then returns to it  Just asks for medicine to help fight this issue more      The following portions of the patient's history were reviewed and updated as appropriate: allergies, current medications, past family history, past medical history, past social history, past surgical history, and problem list.    Review of Systems   Constitutional:  Negative for fever.   HENT:  Positive for congestion.    Respiratory:  Positive for cough. Negative for shortness of breath.        Objective   Physical Exam  Vitals and nursing note reviewed.   Constitutional:       Appearance: He is well-developed.   HENT:      Head: Normocephalic and atraumatic.      Right Ear: Hearing and external ear normal.      Left Ear: Hearing and external ear normal.      Nose: Nose normal.      Mouth/Throat:      Pharynx: Uvula midline.   Eyes:      Conjunctiva/sclera: Conjunctivae normal.   Cardiovascular:      Rate and Rhythm: Normal rate and regular rhythm.      Heart sounds: Normal heart sounds.   Pulmonary:      Effort: Pulmonary effort is normal.      Breath sounds: Rhonchi (throughout) present.   Musculoskeletal:      Cervical back: Normal range of motion.   Lymphadenopathy:      Cervical: No cervical adenopathy.   Psychiatric:         Mood and Affect: Mood normal.         Behavior: Behavior normal.         Thought Content: Thought content normal.         Assessment & Plan   Diagnoses and all orders for this  visit:    1. Walking pneumonia (Primary)  -     azithromycin (Zithromax) 250 MG tablet; Take 2 tablets the first day, then 1 tablet daily for 4 days.  Dispense: 6 tablet; Refill: 0  -     brompheniramine-pseudoephedrine-DM 30-2-10 MG/5ML syrup; Take 5 mL by mouth 4 (Four) Times a Day As Needed for Congestion or Cough.  Dispense: 180 mL; Refill: 0    2. Hypertension, essential  -     carvedilol CR (Coreg CR) 80 MG 24 hr capsule; Take 1 capsule by mouth Daily.  Dispense: 90 capsule; Refill: 0    3. Alcohol abuse  -     naltrexone (DEPADE) 50 MG tablet; Take 1 tablet by mouth Daily.  Dispense: 90 tablet; Refill: 1    4. Acute on chronic diastolic congestive heart failure  -     carvedilol CR (Coreg CR) 80 MG 24 hr capsule; Take 1 capsule by mouth Daily.  Dispense: 90 capsule; Refill: 0    Zpac and bromfed, consider CXR INB in one week.  Pt agrees to call back INB  BP not controlled, missing second dose of coreg 12.5, will switch to coreg CR 80 and recheck in 2 months  Ok naltrexone for alcohol use

## 2025-01-30 ENCOUNTER — TELEPHONE (OUTPATIENT)
Dept: FAMILY MEDICINE CLINIC | Facility: CLINIC | Age: 50
End: 2025-01-30
Payer: COMMERCIAL

## 2025-01-30 NOTE — TELEPHONE ENCOUNTER
Called and spoke w/ pt. He will finish Zpak tomorrow. He said, he was actually able to eat today. He is feeling better and his cough is some better. He will wait til next week and give us a call back if anything worsens.

## 2025-01-30 NOTE — TELEPHONE ENCOUNTER
Caller: Dorene Keita    Relationship: Emergency Contact    Best call back number: [phone]     What was the call regarding: PATIENTS WIFE IS WANTING A CALL BACK AS SOON AS POSSIBLE    Is it okay if the provider responds through MyChart:

## 2025-01-30 NOTE — TELEPHONE ENCOUNTER
Caller: Dorene Keita    Relationship: Emergency Contact    Best call back number:  [phone] (Mobile)     Who are you requesting to speak with (clinical staff, provider,  specific staff member):CLINICAL      What was the call regarding: PATIENT WAS SEEN ON 1-27-25 AND HE HAS BEEN TAKING THE MEDICATION THAT WAS PRESCRIBED   HIS SYMPTOMS ARE GETTING WORSE   HE HAS BODY ACHES, FEVER, FATIGUE AND LACK OF APPETITE AND HAS LOST 10 POUNDS SINCE 1-27-25      PLEASE CALL AND ADVISE

## 2025-03-10 ENCOUNTER — TELEPHONE (OUTPATIENT)
Dept: FAMILY MEDICINE CLINIC | Facility: CLINIC | Age: 50
End: 2025-03-10
Payer: COMMERCIAL

## 2025-03-10 DIAGNOSIS — I10 HYPERTENSION, ESSENTIAL: ICD-10-CM

## 2025-03-10 DIAGNOSIS — I50.33 ACUTE ON CHRONIC DIASTOLIC CONGESTIVE HEART FAILURE: ICD-10-CM

## 2025-03-10 RX ORDER — CARVEDILOL 12.5 MG/1
12.5 TABLET ORAL EVERY 12 HOURS
Qty: 180 TABLET | Refills: 3 | Status: SHIPPED | OUTPATIENT
Start: 2025-03-10

## 2025-03-10 NOTE — TELEPHONE ENCOUNTER
Shadi;   I went to the one a day and I don’t like the way it makes me feel. Are we able to increase the two a day Option I was previously taking instead?

## 2025-03-20 DIAGNOSIS — F10.10 ALCOHOL ABUSE: ICD-10-CM

## 2025-03-20 DIAGNOSIS — I10 HYPERTENSION, ESSENTIAL: ICD-10-CM

## 2025-03-20 DIAGNOSIS — E78.2 MIXED HYPERLIPIDEMIA: ICD-10-CM

## 2025-03-20 RX ORDER — NALTREXONE HYDROCHLORIDE 50 MG/1
50 TABLET, FILM COATED ORAL DAILY
Qty: 90 TABLET | Refills: 1 | Status: SHIPPED | OUTPATIENT
Start: 2025-03-20

## 2025-03-20 RX ORDER — ATORVASTATIN CALCIUM 40 MG/1
40 TABLET, FILM COATED ORAL NIGHTLY
Qty: 90 TABLET | Refills: 0 | Status: SHIPPED | OUTPATIENT
Start: 2025-03-20

## 2025-03-20 RX ORDER — LOSARTAN POTASSIUM 100 MG/1
100 TABLET ORAL DAILY
Qty: 90 TABLET | Refills: 0 | Status: SHIPPED | OUTPATIENT
Start: 2025-03-20

## 2025-04-22 DIAGNOSIS — I10 HYPERTENSION, ESSENTIAL: ICD-10-CM

## 2025-04-22 RX ORDER — AMLODIPINE BESYLATE 5 MG/1
5 TABLET ORAL DAILY
Qty: 90 TABLET | Refills: 0 | Status: SHIPPED | OUTPATIENT
Start: 2025-04-22

## 2025-06-04 ENCOUNTER — TELEPHONE (OUTPATIENT)
Dept: CARDIOLOGY | Facility: HOSPITAL | Age: 50
End: 2025-06-04

## 2025-06-04 NOTE — TELEPHONE ENCOUNTER
Caller: Norberto Keita    Relationship to patient: Self    Best call back number: [phone]       Type of visit: F/U APPT    Requested date: NEXT AVAILABLE          Additional notes:PLEASE CONTACT PATIENT WITH A SUITABLE DATE.

## 2025-06-12 ENCOUNTER — OFFICE VISIT (OUTPATIENT)
Dept: CARDIOLOGY | Facility: HOSPITAL | Age: 50
End: 2025-06-12
Payer: COMMERCIAL

## 2025-06-12 ENCOUNTER — HOSPITAL ENCOUNTER (OUTPATIENT)
Dept: CARDIOLOGY | Facility: HOSPITAL | Age: 50
Discharge: HOME | End: 2025-06-12
Payer: COMMERCIAL

## 2025-06-12 VITALS
DIASTOLIC BLOOD PRESSURE: 101 MMHG | BODY MASS INDEX: 36.57 KG/M2 | HEIGHT: 74 IN | OXYGEN SATURATION: 95 % | HEART RATE: 81 BPM | SYSTOLIC BLOOD PRESSURE: 141 MMHG | WEIGHT: 285 LBS

## 2025-06-12 DIAGNOSIS — I51.7 LVH (LEFT VENTRICULAR HYPERTROPHY): ICD-10-CM

## 2025-06-12 DIAGNOSIS — I10 HYPERTENSION, ESSENTIAL: Primary | ICD-10-CM

## 2025-06-12 DIAGNOSIS — I50.33 ACUTE ON CHRONIC DIASTOLIC CONGESTIVE HEART FAILURE: ICD-10-CM

## 2025-06-12 DIAGNOSIS — G47.33 OBSTRUCTIVE SLEEP APNEA: ICD-10-CM

## 2025-06-12 DIAGNOSIS — I10 HYPERTENSION, ESSENTIAL: ICD-10-CM

## 2025-06-12 PROCEDURE — 93005 ELECTROCARDIOGRAM TRACING: CPT | Performed by: NURSE PRACTITIONER

## 2025-06-12 RX ORDER — CARVEDILOL 25 MG/1
25 TABLET ORAL EVERY 12 HOURS
Qty: 60 TABLET | Refills: 1 | Status: SHIPPED | OUTPATIENT
Start: 2025-06-12

## 2025-06-12 NOTE — PATIENT INSTRUCTIONS
- Lab work before seeing Dr. Araya    - Office will schedule testing  - Office will call or send message with testing results    - Cardiology department will call for appointment

## 2025-06-12 NOTE — PROGRESS NOTES
"Chief Complaint  Hypertension    Subjective      History of Present Illness {  Problem List  Visit  Diagnosis   Encounters  Notes  Medications  Labs  Result Review Imaging  Media :23}     Norberto Keita, 49 y.o. male with HTN, HFpEF, LEILANI presents to [facility] for Hypertension.    Patient presents today to reestablish care for blood pressure and also reports intermittent low O2 saturation at night. No recent BiPAP after stopping PAP after weight loss with gastric sleeve. He has been focusing on efforts to decrease alcohol consumption that recently has been liquor 1/2 pint per day. No chest pain, dyspnea, palpitation/tachypalpitation. No exertional chest pain walking 10k steps per week.  No recent home BP monitoring, but elevated at recent office visits.      Objective     Vital Signs:   Vitals:    06/12/25 1438   BP: (!) 141/101   BP Location: Left arm   Patient Position: Sitting   Pulse: 81   SpO2: 95%   Weight: 129 kg (285 lb)   Height: 188 cm (74\")     Body mass index is 36.59 kg/m².  Physical Exam  Vitals and nursing note reviewed.   Constitutional:       Appearance: Normal appearance.   HENT:      Head: Normocephalic.   Eyes:      Extraocular Movements: Extraocular movements intact.   Neck:      Vascular: No carotid bruit.   Cardiovascular:      Rate and Rhythm: Normal rate and regular rhythm.      Pulses: Normal pulses.      Heart sounds: Normal heart sounds, S1 normal and S2 normal. No murmur heard.  Pulmonary:      Effort: Pulmonary effort is normal. No respiratory distress.      Breath sounds: Normal breath sounds.   Musculoskeletal:      Cervical back: Neck supple.      Right lower leg: No edema.      Left lower leg: No edema.   Skin:     General: Skin is warm and dry.   Neurological:      General: No focal deficit present.      Mental Status: He is alert.   Psychiatric:         Mood and Affect: Mood normal.         Behavior: Behavior " normal.         Thought Content: Thought content normal.        Data Reviewed:{ Labs  Result Review  Imaging  Med Tab  Media :23}     ECG 12 Lead (06/12/2025 14:46)   Stress Test With Pet Myocardial Perfusion (Multi Study) (09/10/2021 13:52)  Adult Transthoracic Echo Limited W/ Cont if Necessary Per Protocol (07/15/2021 13:11)  Testosterone (03/19/2024 14:23)  Lipid Panel (03/19/2024 14:23)  Comprehensive Metabolic Panel (03/19/2024 14:23)  CBC & Differential (03/19/2024 14:23)      Assessment & Plan   Assessment and Plan {CC Problem List  Visit Diagnosis  ROS  Review (Popup)  Health Maintenance  Quality  BestPractice  Medications  SmartSets  SnapShot Encounters  Media :23}     1. Hypertension, essential  -Elevated on recent ambulatory BP monitoring  -Increase carvedilol to 25 Mg twice daily for further afterload control  -Continue amlodipine 5 Mg, losartan 100 Mg daily as prescribed  - Adult Transthoracic Echo Complete W/ Cont if Necessary Per Protocol; Future  - Ambulatory Referral to Cardiology for Hypertension, Heart Failure, Other; LEILANI, LVH, HTN, HFpEF    2. LVH (left ventricular hypertrophy)/HFpEF  -Historical LVH and prior HFpEF exacerbation  -Prior TTE July 2021 with LVEF 51-55%, moderate to severe LVH.  -ECG today with normal sinus rhythm, normal QRS duration  -Appears well compensated/euvolemic on exam  -Further afterload control as above  -Repeat TTE given elevated BP and no recent cardiology follow-up/testing  - Adult Transthoracic Echo Complete W/ Cont if Necessary Per Protocol; Future  - Ambulatory Referral to Cardiology for Hypertension, Heart Failure, Other; LEILANI, LVH, HTN, HFpEF    3. Obstructive sleep apnea (bipap)  -History of LEILANI on BiPAP, no recent BiPAP use after weight loss   -Reports intermittent nocturnal desaturation  - Discussed would ideally restart BiPAP, will refer to Dr. Araya for assistance with LEILANI management      Follow Up {Instructions Charge Capture  Follow-up  Communications :23}     Return if symptoms worsen or fail to improve.      Patient was given instructions and counseling regarding his condition or for health maintenance advice. Please see specific information pulled into the AVS if appropriate. Patient was instructed to call the [facility] with any questions, concerns, or worsening symptoms.    Dictated Utilizing Dragon Dictation   Please note that portions of this note were completed with a voice recognition program. Part of this note may be an electronic transcription/translation of spoken language to printed text using the Dragon Dictation System.

## 2025-06-13 LAB
QT INTERVAL: 396 MS
QTC INTERVAL: 451 MS

## 2025-06-26 ENCOUNTER — HOSPITAL ENCOUNTER (OUTPATIENT)
Facility: HOSPITAL | Age: 50
Discharge: HOME | End: 2025-06-26
Admitting: NURSE PRACTITIONER
Payer: COMMERCIAL

## 2025-06-26 VITALS — HEIGHT: 74 IN | BODY MASS INDEX: 36.5 KG/M2 | WEIGHT: 284.39 LBS

## 2025-06-26 DIAGNOSIS — I10 HYPERTENSION, ESSENTIAL: ICD-10-CM

## 2025-06-26 DIAGNOSIS — I51.7 LVH (LEFT VENTRICULAR HYPERTROPHY): ICD-10-CM

## 2025-06-26 LAB
AORTIC DIMENSIONLESS INDEX: 0.78 (DI)
ASCENDING AORTA: 4 CM
AV MEAN PRESS GRAD SYS DOP V1V2: 5 MMHG
AV VMAX SYS DOP: 151 CM/SEC
BH CV ECHO MEAS - AO MAX PG: 9.1 MMHG
BH CV ECHO MEAS - AO ROOT DIAM: 3.9 CM
BH CV ECHO MEAS - AO V2 VTI: 31.8 CM
BH CV ECHO MEAS - AVA(I,D): 2.7 CM2
BH CV ECHO MEAS - EDV(CUBED): 171.4 ML
BH CV ECHO MEAS - EDV(MOD-SP2): 102 ML
BH CV ECHO MEAS - EDV(MOD-SP4): 105 ML
BH CV ECHO MEAS - EF(MOD-SP2): 62.6 %
BH CV ECHO MEAS - EF(MOD-SP4): 59.7 %
BH CV ECHO MEAS - ESV(CUBED): 48.4 ML
BH CV ECHO MEAS - ESV(MOD-SP2): 38.1 ML
BH CV ECHO MEAS - ESV(MOD-SP4): 42.3 ML
BH CV ECHO MEAS - FS: 34.4 %
BH CV ECHO MEAS - IVS/LVPW: 0.93 CM
BH CV ECHO MEAS - IVSD: 1 CM
BH CV ECHO MEAS - LA DIMENSION: 4.1 CM
BH CV ECHO MEAS - LAT PEAK E' VEL: 9.1 CM/SEC
BH CV ECHO MEAS - LV DIASTOLIC VOL/BSA (35-75): 41.8 CM2
BH CV ECHO MEAS - LV MASS(C)D: 228.6 GRAMS
BH CV ECHO MEAS - LV MAX PG: 6 MMHG
BH CV ECHO MEAS - LV MEAN PG: 3.5 MMHG
BH CV ECHO MEAS - LV SYSTOLIC VOL/BSA (12-30): 16.8 CM2
BH CV ECHO MEAS - LV V1 MAX: 122 CM/SEC
BH CV ECHO MEAS - LV V1 VTI: 24.8 CM
BH CV ECHO MEAS - LVIDD: 5.6 CM
BH CV ECHO MEAS - LVIDS: 3.6 CM
BH CV ECHO MEAS - LVOT AREA: 3.5 CM2
BH CV ECHO MEAS - LVOT DIAM: 2.1 CM
BH CV ECHO MEAS - LVPWD: 1.08 CM
BH CV ECHO MEAS - MED PEAK E' VEL: 7.5 CM/SEC
BH CV ECHO MEAS - MV A MAX VEL: 72.3 CM/SEC
BH CV ECHO MEAS - MV DEC SLOPE: 532 CM/SEC2
BH CV ECHO MEAS - MV DEC TIME: 0.17 SEC
BH CV ECHO MEAS - MV E MAX VEL: 89.5 CM/SEC
BH CV ECHO MEAS - MV E/A: 1.24
BH CV ECHO MEAS - MV MAX PG: 4.2 MMHG
BH CV ECHO MEAS - MV MEAN PG: 2 MMHG
BH CV ECHO MEAS - MV V2 VTI: 23.4 CM
BH CV ECHO MEAS - MVA(VTI): 3.7 CM2
BH CV ECHO MEAS - PA ACC TIME: 0.13 SEC
BH CV ECHO MEAS - PA V2 MAX: 107 CM/SEC
BH CV ECHO MEAS - RAP SYSTOLE: 3 MMHG
BH CV ECHO MEAS - RVSP: 23 MMHG
BH CV ECHO MEAS - SV(LVOT): 85.9 ML
BH CV ECHO MEAS - SV(MOD-SP2): 63.9 ML
BH CV ECHO MEAS - SV(MOD-SP4): 62.7 ML
BH CV ECHO MEAS - SVI(LVOT): 34.2 ML/M2
BH CV ECHO MEAS - SVI(MOD-SP2): 25.4 ML/M2
BH CV ECHO MEAS - SVI(MOD-SP4): 24.9 ML/M2
BH CV ECHO MEAS - TAPSE (>1.6): 2.31 CM
BH CV ECHO MEAS - TR MAX PG: 20.3 MMHG
BH CV ECHO MEAS - TR MAX VEL: 225 CM/SEC
BH CV ECHO MEASUREMENTS AVERAGE E/E' RATIO: 10.78
BH CV VAS BP LEFT ARM: NORMAL MMHG
BH CV XLRA - RV BASE: 3.7 CM
BH CV XLRA - RV LENGTH: 9.1 CM
BH CV XLRA - RV MID: 3 CM
BH CV XLRA - TDI S': 12.4 CM/SEC
IVRT: 81 MS
LEFT ATRIUM VOLUME INDEX: 29 ML/M2
LV EF BIPLANE MOD: 62.5 %

## 2025-06-26 PROCEDURE — 25010000002 SULFUR HEXAFLUORIDE MICROSPH 60.7-25 MG RECONSTITUTED SUSPENSION: Performed by: NURSE PRACTITIONER

## 2025-06-26 PROCEDURE — 93306 TTE W/DOPPLER COMPLETE: CPT

## 2025-06-26 RX ADMIN — SULFUR HEXAFLUORIDE 2 ML: KIT at 14:40

## 2025-07-20 DIAGNOSIS — I10 HYPERTENSION, ESSENTIAL: ICD-10-CM

## 2025-07-21 RX ORDER — AMLODIPINE BESYLATE 5 MG/1
5 TABLET ORAL DAILY
Qty: 90 TABLET | Refills: 0 | Status: SHIPPED | OUTPATIENT
Start: 2025-07-21

## 2025-07-22 ENCOUNTER — TELEPHONE (OUTPATIENT)
Dept: FAMILY MEDICINE CLINIC | Facility: CLINIC | Age: 50
End: 2025-07-22

## 2025-08-06 DIAGNOSIS — I51.7 LVH (LEFT VENTRICULAR HYPERTROPHY): ICD-10-CM

## 2025-08-06 DIAGNOSIS — I50.33 ACUTE ON CHRONIC DIASTOLIC CONGESTIVE HEART FAILURE: ICD-10-CM

## 2025-08-06 DIAGNOSIS — I10 HYPERTENSION, ESSENTIAL: ICD-10-CM

## 2025-08-06 RX ORDER — CARVEDILOL 25 MG/1
25 TABLET ORAL EVERY 12 HOURS
Qty: 60 TABLET | Refills: 1 | Status: SHIPPED | OUTPATIENT
Start: 2025-08-06 | End: 2025-08-08 | Stop reason: SDUPTHER

## 2025-08-08 ENCOUNTER — TELEPHONE (OUTPATIENT)
Dept: CARDIOLOGY | Facility: HOSPITAL | Age: 50
End: 2025-08-08
Payer: COMMERCIAL

## 2025-08-08 DIAGNOSIS — I51.7 LVH (LEFT VENTRICULAR HYPERTROPHY): ICD-10-CM

## 2025-08-08 DIAGNOSIS — I10 HYPERTENSION, ESSENTIAL: ICD-10-CM

## 2025-08-08 DIAGNOSIS — I50.33 ACUTE ON CHRONIC DIASTOLIC CONGESTIVE HEART FAILURE: ICD-10-CM

## 2025-08-08 RX ORDER — CARVEDILOL 25 MG/1
25 TABLET ORAL EVERY 12 HOURS
Qty: 60 TABLET | Refills: 1 | Status: SHIPPED | OUTPATIENT
Start: 2025-08-08

## 2025-08-14 ENCOUNTER — PATIENT ROUNDING (BHMG ONLY) (OUTPATIENT)
Dept: CARDIOLOGY | Facility: CLINIC | Age: 50
End: 2025-08-14
Payer: COMMERCIAL

## 2025-08-14 ENCOUNTER — OFFICE VISIT (OUTPATIENT)
Age: 50
End: 2025-08-14
Payer: COMMERCIAL

## 2025-08-14 VITALS
HEIGHT: 74 IN | HEART RATE: 51 BPM | SYSTOLIC BLOOD PRESSURE: 140 MMHG | DIASTOLIC BLOOD PRESSURE: 100 MMHG | BODY MASS INDEX: 37.86 KG/M2 | OXYGEN SATURATION: 95 % | WEIGHT: 295 LBS

## 2025-08-14 DIAGNOSIS — I51.7 LVH (LEFT VENTRICULAR HYPERTROPHY): ICD-10-CM

## 2025-08-14 DIAGNOSIS — E78.00 PURE HYPERCHOLESTEROLEMIA: ICD-10-CM

## 2025-08-14 DIAGNOSIS — Z82.49 FAMILY HISTORY OF ISCHEMIC HEART DISEASE: ICD-10-CM

## 2025-08-14 DIAGNOSIS — I10 HYPERTENSION, ESSENTIAL: ICD-10-CM

## 2025-08-14 DIAGNOSIS — G47.33 OBSTRUCTIVE SLEEP APNEA: Primary | ICD-10-CM

## 2025-08-14 RX ORDER — AMLODIPINE BESYLATE 10 MG/1
10 TABLET ORAL DAILY
Qty: 30 TABLET | Refills: 5 | Status: SHIPPED | OUTPATIENT
Start: 2025-08-14

## 2025-08-15 ENCOUNTER — LAB (OUTPATIENT)
Facility: HOSPITAL | Age: 50
End: 2025-08-15
Payer: COMMERCIAL

## 2025-08-15 DIAGNOSIS — I10 HYPERTENSION, ESSENTIAL: ICD-10-CM

## 2025-08-15 DIAGNOSIS — E78.00 PURE HYPERCHOLESTEROLEMIA: ICD-10-CM

## 2025-08-15 LAB
ALBUMIN SERPL-MCNC: 4.5 G/DL (ref 3.5–5.2)
ALBUMIN/GLOB SERPL: 1.5 G/DL
ALP SERPL-CCNC: 82 U/L (ref 39–117)
ALT SERPL W P-5'-P-CCNC: 25 U/L (ref 1–41)
ANION GAP SERPL CALCULATED.3IONS-SCNC: 13 MMOL/L (ref 5–15)
AST SERPL-CCNC: 34 U/L (ref 1–40)
BASOPHILS # BLD AUTO: 0.08 10*3/MM3 (ref 0–0.2)
BASOPHILS NFR BLD AUTO: 1.1 % (ref 0–1.5)
BILIRUB SERPL-MCNC: 2 MG/DL (ref 0–1.2)
BUN SERPL-MCNC: 14 MG/DL (ref 6–20)
BUN/CREAT SERPL: 16.9 (ref 7–25)
CALCIUM SPEC-SCNC: 9.6 MG/DL (ref 8.6–10.5)
CHLORIDE SERPL-SCNC: 98 MMOL/L (ref 98–107)
CHOLEST SERPL-MCNC: 191 MG/DL (ref 0–200)
CO2 SERPL-SCNC: 27 MMOL/L (ref 22–29)
CREAT SERPL-MCNC: 0.83 MG/DL (ref 0.76–1.27)
DEPRECATED RDW RBC AUTO: 43 FL (ref 37–54)
EGFRCR SERPLBLD CKD-EPI 2021: 108 ML/MIN/1.73
EOSINOPHIL # BLD AUTO: 0.08 10*3/MM3 (ref 0–0.4)
EOSINOPHIL NFR BLD AUTO: 1.1 % (ref 0.3–6.2)
ERYTHROCYTE [DISTWIDTH] IN BLOOD BY AUTOMATED COUNT: 12.8 % (ref 12.3–15.4)
GLOBULIN UR ELPH-MCNC: 3 GM/DL
GLUCOSE SERPL-MCNC: 91 MG/DL (ref 65–99)
HBA1C MFR BLD: 5.5 % (ref 4.8–5.6)
HCT VFR BLD AUTO: 49.6 % (ref 37.5–51)
HDLC SERPL-MCNC: 62 MG/DL (ref 40–60)
HGB BLD-MCNC: 17 G/DL (ref 13–17.7)
IMM GRANULOCYTES # BLD AUTO: 0.03 10*3/MM3 (ref 0–0.05)
IMM GRANULOCYTES NFR BLD AUTO: 0.4 % (ref 0–0.5)
LDLC SERPL CALC-MCNC: 112 MG/DL (ref 0–100)
LDLC/HDLC SERPL: 1.77 {RATIO}
LYMPHOCYTES # BLD AUTO: 2.41 10*3/MM3 (ref 0.7–3.1)
LYMPHOCYTES NFR BLD AUTO: 33.9 % (ref 19.6–45.3)
MCH RBC QN AUTO: 31.9 PG (ref 26.6–33)
MCHC RBC AUTO-ENTMCNC: 34.3 G/DL (ref 31.5–35.7)
MCV RBC AUTO: 93.1 FL (ref 79–97)
MONOCYTES # BLD AUTO: 0.56 10*3/MM3 (ref 0.1–0.9)
MONOCYTES NFR BLD AUTO: 7.9 % (ref 5–12)
NEUTROPHILS NFR BLD AUTO: 3.95 10*3/MM3 (ref 1.7–7)
NEUTROPHILS NFR BLD AUTO: 55.6 % (ref 42.7–76)
NRBC BLD AUTO-RTO: 0 /100 WBC (ref 0–0.2)
PLATELET # BLD AUTO: 197 10*3/MM3 (ref 140–450)
PMV BLD AUTO: 10.4 FL (ref 6–12)
POTASSIUM SERPL-SCNC: 4.8 MMOL/L (ref 3.5–5.2)
PROT SERPL-MCNC: 7.5 G/DL (ref 6–8.5)
RBC # BLD AUTO: 5.33 10*6/MM3 (ref 4.14–5.8)
SODIUM SERPL-SCNC: 138 MMOL/L (ref 136–145)
TRIGL SERPL-MCNC: 97 MG/DL (ref 0–150)
TSH SERPL DL<=0.05 MIU/L-ACNC: 1.14 UIU/ML (ref 0.27–4.2)
VLDLC SERPL-MCNC: 17 MG/DL (ref 5–40)
WBC NRBC COR # BLD AUTO: 7.11 10*3/MM3 (ref 3.4–10.8)

## 2025-08-15 PROCEDURE — 83036 HEMOGLOBIN GLYCOSYLATED A1C: CPT

## 2025-08-15 PROCEDURE — 80061 LIPID PANEL: CPT

## 2025-08-15 PROCEDURE — 80050 GENERAL HEALTH PANEL: CPT

## 2025-08-15 PROCEDURE — 36415 COLL VENOUS BLD VENIPUNCTURE: CPT

## 2025-08-15 PROCEDURE — 83695 ASSAY OF LIPOPROTEIN(A): CPT

## 2025-08-18 LAB — LPA SERPL-SCNC: <8.4 NMOL/L

## (undated) DEVICE — CONN TBG Y 5 IN 1 LF STRL

## (undated) DEVICE — NDL HYPO PRECISIONGLIDE REG 20G 1 1/2

## (undated) DEVICE — LAPAROSCOPIC SMOKE FILTRATION SYSTEM: Brand: PALL LAPAROSHIELD® PLUS LAPAROSCOPIC SMOKE FILTRATION SYSTEM

## (undated) DEVICE — 500ML,PRESSURE INFUSER W/STOPCOCK: Brand: MEDLINE

## (undated) DEVICE — ENSEAL LAPAROSCOPIC TISSUE SEALER G2 ARTICULATING CURVED JAW FOR USE WITH G2 GENERATOR 5MM DIAMETER 45CM SHAFT LENGTH: Brand: ENSEAL

## (undated) DEVICE — PATIENT RETURN ELECTRODE, SINGLE-USE, CONTACT QUALITY MONITORING, ADULT, WITH 9FT CORD, FOR PATIENTS WEIGING OVER 33LBS. (15KG): Brand: MEGADYNE

## (undated) DEVICE — PK OSC LAP SLV 40

## (undated) DEVICE — GLV SURG SENSICARE PI MIC PF SZ8.5 LF STRL

## (undated) DEVICE — GLV SURG SENSICARE PI PF LF 8.5 GRN STRL

## (undated) DEVICE — APL DUPLOSPRAYER MIS 40CM

## (undated) DEVICE — TROCAR: Brand: KII OPTICAL ACCESS SYSTEM

## (undated) DEVICE — Device: Brand: STANDARD BOUGIE, 38FR

## (undated) DEVICE — TROC STANDARDTROCAR FOR/TITANSGS 19MM DISP STRL

## (undated) DEVICE — MARKR SKIN W/RULR AND LBL

## (undated) DEVICE — VISUALIZATION SYSTEM: Brand: CLEARIFY

## (undated) DEVICE — LAPAROSCOPIC DISSECTOR: Brand: DEROYAL